# Patient Record
Sex: FEMALE | Race: WHITE | NOT HISPANIC OR LATINO | Employment: OTHER | URBAN - METROPOLITAN AREA
[De-identification: names, ages, dates, MRNs, and addresses within clinical notes are randomized per-mention and may not be internally consistent; named-entity substitution may affect disease eponyms.]

---

## 2017-07-26 ENCOUNTER — TELEPHONE (OUTPATIENT)
Dept: OBSTETRICS AND GYNECOLOGY | Facility: CLINIC | Age: 67
End: 2017-07-26

## 2017-07-26 NOTE — TELEPHONE ENCOUNTER
Returned pt call and pt stated she thinks she have a yeast infection. Informed pt that  is out of office and will be back on 8/7/2017. Advised pt if she needed to be seen sooner that I can schedule her with urgent care. Pt refused and stated she would like to see . Scheduled pt with  on 8/7/2017. Pt verbalized understanding

## 2017-07-26 NOTE — TELEPHONE ENCOUNTER
----- Message from Brianne Sweeney sent at 7/26/2017  1:54 PM CDT -----  Contact: self  Pt needing a call back, she think she have a yeast infection, pt use [facility] [email or web address]. She can be reached at [phone].

## 2017-08-07 ENCOUNTER — OFFICE VISIT (OUTPATIENT)
Dept: OBSTETRICS AND GYNECOLOGY | Facility: CLINIC | Age: 67
End: 2017-08-07
Attending: OBSTETRICS & GYNECOLOGY
Payer: COMMERCIAL

## 2017-08-07 VITALS
BODY MASS INDEX: 39.49 KG/M2 | SYSTOLIC BLOOD PRESSURE: 138 MMHG | WEIGHT: 237 LBS | HEIGHT: 65 IN | DIASTOLIC BLOOD PRESSURE: 76 MMHG

## 2017-08-07 DIAGNOSIS — Z01.419 WELL WOMAN EXAM WITH ROUTINE GYNECOLOGICAL EXAM: Primary | ICD-10-CM

## 2017-08-07 PROCEDURE — 99999 PR PBB SHADOW E&M-EST. PATIENT-LVL IV: CPT | Mod: PBBFAC,,, | Performed by: OBSTETRICS & GYNECOLOGY

## 2017-08-07 PROCEDURE — 99396 PREV VISIT EST AGE 40-64: CPT | Mod: S$GLB,,, | Performed by: OBSTETRICS & GYNECOLOGY

## 2017-08-07 RX ORDER — TAMSULOSIN HYDROCHLORIDE 0.4 MG/1
CAPSULE ORAL
Refills: 0 | COMMUNITY
Start: 2017-07-13 | End: 2019-05-01 | Stop reason: CLARIF

## 2017-08-07 RX ORDER — SODIUM, POTASSIUM,MAG SULFATES 17.5-3.13G
SOLUTION, RECONSTITUTED, ORAL ORAL
COMMUNITY
Start: 2017-08-04 | End: 2020-02-07

## 2017-08-07 RX ORDER — LINAGLIPTIN 5 MG/1
TABLET, FILM COATED ORAL
Refills: 3 | COMMUNITY
Start: 2017-07-12 | End: 2019-05-01 | Stop reason: CLARIF

## 2017-08-07 RX ORDER — METOPROLOL SUCCINATE 25 MG/1
TABLET, EXTENDED RELEASE ORAL
COMMUNITY
Start: 2017-07-24 | End: 2020-02-07 | Stop reason: SDUPTHER

## 2017-08-07 RX ORDER — ROSUVASTATIN CALCIUM 20 MG/1
TABLET, COATED ORAL
Refills: 8 | COMMUNITY
Start: 2017-06-05 | End: 2020-02-07 | Stop reason: SDUPTHER

## 2017-08-07 RX ORDER — CEPHALEXIN 500 MG/1
CAPSULE ORAL
Refills: 0 | COMMUNITY
Start: 2017-07-14 | End: 2019-05-01 | Stop reason: CLARIF

## 2017-08-07 RX ORDER — NITROGLYCERIN 0.4 MG/1
TABLET SUBLINGUAL
Refills: 4 | COMMUNITY
Start: 2017-05-12 | End: 2019-05-01 | Stop reason: CLARIF

## 2017-08-07 NOTE — PROGRESS NOTES
SUBJECTIVE:   66 y.o. female   for annual routine Pap and checkup. No LMP recorded. Patient has had a hysterectomy..      Past Medical History:   Diagnosis Date    Achilles tendon tear     Diabetes mellitus     Dysplasia of cervix     Glaucoma (increased eye pressure)     Hypertension     Thyroid disease     Urinary incontinence     occ urge     Past Surgical History:   Procedure Laterality Date    ACHILLES TENDON SURGERY      CERVIX LESION DESTRUCTION      cryo x 2    CHOLECYSTECTOMY      HYSTERECTOMY      BLAKE/ovaries remain    LAMINECTOMY      TUBAL LIGATION       Social History     Social History    Marital status:      Spouse name: N/A    Number of children: N/A    Years of education: N/A     Occupational History    Not on file.     Social History Main Topics    Smoking status: Former Smoker    Smokeless tobacco: Not on file    Alcohol use Yes      Comment: occ    Drug use: No    Sexual activity: Yes     Other Topics Concern    Not on file     Social History Narrative    No narrative on file     Family History   Problem Relation Age of Onset    Cancer Maternal Aunt      cervical    Breast cancer Paternal Grandmother     Ovarian cancer Neg Hx      OB History    Para Term  AB Living   3 3 3         SAB TAB Ectopic Multiple Live Births                  # Outcome Date GA Lbr Manuelito/2nd Weight Sex Delivery Anes PTL Lv   3 Term            2 Term            1 Term                   Current Outpatient Prescriptions   Medication Sig Dispense Refill    brimonidine-timolol (COMBIGAN) 0.2-0.5 % Drop Place 1 drop into both eyes.      cephALEXin (KEFLEX) 500 MG capsule TK ONE C PO  Q 8 H FOR 5 DAYS  0    ibuprofen (ADVIL,MOTRIN) 200 MG tablet Take 200 mg by mouth every 6 (six) hours as needed.      latanoprost 0.005 % ophthalmic solution 1 drop every evening.      levothyroxine (SYNTHROID) 100 MCG tablet Take 100 mcg by mouth once daily.      metformin (GLUCOPHAGE-XR)  500 MG 24 hr tablet       metformin (GLUMETZA) 500 MG (MOD) 24 hr tablet Take 500 mg by mouth daily with breakfast.      metoprolol succinate (TOPROL-XL) 25 MG 24 hr tablet       nitroGLYCERIN (NITROSTAT) 0.4 MG SL tablet   4    pravastatin (PRAVACHOL) 40 MG tablet       quinapril (ACCUPRIL) 10 MG tablet Take 10 mg by mouth once daily.      rosuvastatin (CRESTOR) 20 MG tablet TK 1 T PO QD  8    SUPREP BOWEL PREP KIT 17.5-3.13-1.6 gram SolR       SYNTHROID 112 mcg tablet       tamsulosin (FLOMAX) 0.4 mg Cp24 TK 1 C PO QD 30 MIN AFTER THE SAME MEAL  0    TRADJENTA 5 mg Tab tablet TK 1 T PO QD  3     No current facility-administered medications for this visit.      Allergies: Percocet [oxycodone-acetaminophen]     CHIEF COMPLAINT: She has no unusual complaints.   Annual visit Yes      ROS:  Constitutional: no weight loss, weight gain, fever, fatigue  Eyes:  No vision changes, glasses/contacts  ENT/Mouth: No ulcers, sinus problems, ears ringing, headache  Cardiovascular: No inability to lie flat, chest pain, exercise intolerance, swelling, heart palpitations  Respiratory: No wheezing, coughing blood, shortness of breath, or cough  Gastrointestinal: No diarrhea, bloody stool, nausea/vomiting, constipation, gas, hemorrhoids  Genitourinary: No blood in urine, painful urination, urgency of urination, frequency of urination, incomplete emptying, incontinence, abnormal bleeding, painful periods, heavy periods, vaginal discharge, vaginal odor, painful intercourse, sexual problems, bleeding after intercourse.  Musculoskeletal: No muscle weakness  Skin/Breast: No painful breasts, nipple discharge, masses, rash, ulcers  Neurological: No passing out, seizures, numbness, headache  Endocrine: No diabetes, hypothyroid, hyperthyroid, hot flashes, hair loss, abnormal hair growth, ance  Psychiatric: No depression, crying  Hematologic: No bruises, bleeding, swollen lymph nodes, anemia.      OBJECTIVE:   The patient appears  "well, alert, oriented x 3, in no distress.  /76   Ht 5' 5" (1.651 m)   Wt 107.5 kg (236 lb 15.9 oz)   BMI 39.44 kg/m²   NECK: no thyromegaly, trachea midline  SKIN: no acne, striae, hirsutism  BREAST EXAM: breasts appear normal, no suspicious masses, no skin or nipple changes or axillary nodes  ABDOMEN: no hernias, masses, or hepatosplenomegaly  GENITALIA: normal external genitalia, no erythema, no discharge  URETHRA: normal urethra, normal urethral meatus  VAGINA: Normal  CERVIX: absent  UTERUS: uterus absent  ADNEXA: no mass, fullness, tenderness      ASSESSMENT:   1. Well woman exam with routine gynecological exam        PLAN:   mammogram  return annually or prn   "

## 2017-08-08 ENCOUNTER — TELEPHONE (OUTPATIENT)
Dept: OBSTETRICS AND GYNECOLOGY | Facility: CLINIC | Age: 67
End: 2017-08-08

## 2017-08-08 ENCOUNTER — HOSPITAL ENCOUNTER (OUTPATIENT)
Dept: RADIOLOGY | Facility: HOSPITAL | Age: 67
Discharge: HOME | End: 2017-08-08
Attending: OBSTETRICS & GYNECOLOGY
Payer: COMMERCIAL

## 2017-08-08 DIAGNOSIS — Z12.31 ENCOUNTER FOR SCREENING MAMMOGRAM FOR BREAST CANCER: ICD-10-CM

## 2017-08-08 DIAGNOSIS — Z01.419 WELL WOMAN EXAM WITH ROUTINE GYNECOLOGICAL EXAM: ICD-10-CM

## 2017-08-08 PROCEDURE — 77063 BREAST TOMOSYNTHESIS BI: CPT | Mod: 26,,, | Performed by: RADIOLOGY

## 2017-08-08 PROCEDURE — 77067 SCR MAMMO BI INCL CAD: CPT | Mod: TC

## 2017-08-08 PROCEDURE — 77067 SCR MAMMO BI INCL CAD: CPT | Mod: 26,,, | Performed by: RADIOLOGY

## 2017-08-09 ENCOUNTER — TELEPHONE (OUTPATIENT)
Dept: OBSTETRICS AND GYNECOLOGY | Facility: CLINIC | Age: 67
End: 2017-08-09

## 2017-08-09 NOTE — TELEPHONE ENCOUNTER
----- Message from Brianne Sweeney sent at 8/9/2017  8:42 AM CDT -----  Contact: self   Pt returning a missed call, she can be reached at [phone].

## 2018-11-02 ENCOUNTER — HOSPITAL ENCOUNTER (OUTPATIENT)
Dept: RADIOLOGY | Facility: HOSPITAL | Age: 68
Discharge: HOME | End: 2018-11-02
Attending: INTERNAL MEDICINE
Payer: MEDICARE

## 2018-11-02 DIAGNOSIS — Z12.31 ENCOUNTER FOR SCREENING MAMMOGRAM FOR MALIGNANT NEOPLASM OF BREAST: ICD-10-CM

## 2018-11-02 PROCEDURE — 77067 SCR MAMMO BI INCL CAD: CPT | Mod: 26,,, | Performed by: RADIOLOGY

## 2018-11-02 PROCEDURE — 77067 SCR MAMMO BI INCL CAD: CPT | Mod: TC,PO

## 2019-05-01 ENCOUNTER — ANESTHESIA EVENT (OUTPATIENT)
Dept: SURGERY | Facility: OTHER | Age: 69
End: 2019-05-01
Payer: MEDICARE

## 2019-05-01 ENCOUNTER — HOSPITAL ENCOUNTER (OUTPATIENT)
Dept: PREADMISSION TESTING | Facility: OTHER | Age: 69
Discharge: HOME | End: 2019-05-01
Attending: ORTHOPAEDIC SURGERY
Payer: MEDICARE

## 2019-05-01 VITALS
HEIGHT: 65 IN | SYSTOLIC BLOOD PRESSURE: 141 MMHG | HEART RATE: 77 BPM | WEIGHT: 224 LBS | RESPIRATION RATE: 16 BRPM | BODY MASS INDEX: 37.32 KG/M2 | OXYGEN SATURATION: 97 % | TEMPERATURE: 98 F | DIASTOLIC BLOOD PRESSURE: 80 MMHG

## 2019-05-01 RX ORDER — ACETAMINOPHEN 500 MG
1000 TABLET ORAL
Status: CANCELLED | OUTPATIENT
Start: 2019-05-01 | End: 2019-05-01

## 2019-05-01 RX ORDER — MONTELUKAST SODIUM 10 MG/1
10 TABLET ORAL NIGHTLY
COMMUNITY
End: 2021-02-11 | Stop reason: SDUPTHER

## 2019-05-01 RX ORDER — FAMOTIDINE 20 MG/1
20 TABLET, FILM COATED ORAL
Status: CANCELLED | OUTPATIENT
Start: 2019-05-01 | End: 2019-05-01

## 2019-05-01 RX ORDER — CEFAZOLIN SODIUM 2 G/50ML
2 SOLUTION INTRAVENOUS
Status: CANCELLED | OUTPATIENT
Start: 2019-05-03

## 2019-05-01 RX ORDER — ACETAMINOPHEN 500 MG
500 TABLET ORAL EVERY 6 HOURS PRN
COMMUNITY

## 2019-05-01 RX ORDER — LIDOCAINE HYDROCHLORIDE 10 MG/ML
0.5 INJECTION, SOLUTION EPIDURAL; INFILTRATION; INTRACAUDAL; PERINEURAL ONCE
Status: CANCELLED | OUTPATIENT
Start: 2019-05-01 | End: 2019-05-01

## 2019-05-01 RX ORDER — SODIUM CHLORIDE, SODIUM LACTATE, POTASSIUM CHLORIDE, CALCIUM CHLORIDE 600; 310; 30; 20 MG/100ML; MG/100ML; MG/100ML; MG/100ML
INJECTION, SOLUTION INTRAVENOUS CONTINUOUS
Status: CANCELLED | OUTPATIENT
Start: 2019-05-01

## 2019-05-01 NOTE — DISCHARGE INSTRUCTIONS
PRE-ADMIT TESTING -  [phone]    [address]  [facility]          Your surgery has been scheduled at [facility]. We are pleased to have the opportunity to serve you. For Further Information please call [phone].    On the day of surgery please report to the Information Desk on the 1st floor.    · CONTACT YOUR PHYSICIAN'S OFFICE THE DAY PRIOR TO YOUR SURGERY TO OBTAIN YOUR ARRIVAL TIME.     · The evening before surgery do not eat anything after 9 p.m. ( this includes hard candy, chewing gum and mints).  You may only have GATORADE, POWERADE AND WATER  from 9 p.m. until you leave your home.   DO NOT DRINK ANY LIQUIDS ON THE WAY TO THE HOSPITAL.      SPECIAL MEDICATION INSTRUCTIONS: TAKE medications checked off by the Anesthesiologist on your Medication List.    Angiogram Patients: Take medications as instructed by your physician, including aspirin.     Surgery Patients:    If you take ASPIRIN - Your PHYSICIAN/SURGEON will need to inform you IF/OR when you need to stop taking aspirin prior to your surgery.     Do Not take any medications containing IBUPROFEN.  Do Not Wear any make-up or dark nail polish   (especially eye make-up) to surgery. If you come to surgery with makeup on you will be required to remove the makeup or nail polish.    Do not shave your surgical area at least 5 days prior to your surgery. The surgical prep will be performed at the hospital according to Infection Control regulations.    Leave all valuables at home.   Do Not wear any jewelry or watches, including any metal in body piercings. Jewelry must be removed prior to coming to the hospital.  There is a possibility that rings that are unable to be removed may be cut off if they are on the surgical extremity.    Contact Lens must be removed before surgery. Either do not wear the contact lens or bring a case and solution for storage.  Please bring a container for eyeglasses or dentures as required.  Bring  any paperwork your physician has provided, such as consent forms,  history and physicals, doctor's orders, etc.   Bring comfortable clothes that are loose fitting to wear upon discharge. Take into consideration the type of surgery being performed.  Maintain your diet as advised per your physician the day prior to surgery.      Adequate rest the night before surgery is advised.   Park in the Parking lot behind the hospital or in the [facility] across the street from the parking lot. Parking is complimentary.  If you will be discharged the same day as your procedure, please arrange for a responsible adult to drive you home or to accompany you if traveling by taxi.   YOU WILL NOT BE PERMITTED TO DRIVE OR TO LEAVE THE HOSPITAL ALONE AFTER SURGERY.   It is strongly recommended that you arrange for someone to remain with you for the first 24 hrs following your surgery.       Thank you for your cooperation.  The Staff of [facility].                Bathing Instructions with Hibiclens     Shower the evening before and morning of your procedure with Hibiclens:   Wash your face with water and your regular face wash/soap   Apply Hibiclens directly on your skin or on a wet washcloth and wash gently. When showering: Move away from the shower stream when applying Hibiclens to avoid rinsing off too soon.   Rinse thoroughly with warm water   Do not dilute Hibiclens         Dry off as usual, do not use any deodorant, powder, body lotions, perfume, after shave or cologne.

## 2019-05-01 NOTE — ANESTHESIA PREPROCEDURE EVALUATION
05/01/2019  Alvina Fisher is a 68 y.o., female.    Anesthesia Evaluation    I have reviewed the Patient Summary Reports.    I have reviewed the Nursing Notes.   I have reviewed the Medications.     Review of Systems  Anesthesia Hx:  No problems with previous Anesthesia  History of prior surgery of interest to airway management or planning: Previous anesthesia: General 6 yrs Achilles tendon surg with general anesthesia.  Denies Family Hx of Anesthesia complications.   Denies Personal Hx of Anesthesia complications.   Social:  Non-Smoker    Hematology/Oncology:  Hematology Normal   Oncology Normal     EENT/Dental:EENT/Dental Normal   Cardiovascular:   Hypertension, well controlled    Pulmonary:   Sleep Apnea, CPAP Has lung nodule no issues   Renal/:  Renal/ Normal     Hepatic/GI:   GERD, well controlled    Neurological:  Neurology Normal    Endocrine:   Diabetes, type 2        Physical Exam  General:  Obesity    Airway/Jaw/Neck:  Airway Findings: Mouth Opening: Small, but > 3cm Tongue: Normal  Mallampati: II      Dental:  Dental Findings: Molar caps, Upper front caps        Mental Status:  Mental Status Findings:  Cooperative, Alert and Oriented         Anesthesia Plan  Type of Anesthesia, risks & benefits discussed:  Anesthesia Type:  general  Patient's Preference:   Intra-op Monitoring Plan: standard ASA monitors  Intra-op Monitoring Plan Comments:   Post Op Pain Control Plan: multimodal analgesia  Post Op Pain Control Plan Comments:   Induction:   IV  Beta Blocker:         Informed Consent: Patient understands risks and agrees with Anesthesia plan.  Questions answered. Anesthesia consent signed with patient.  ASA Score: 2     Day of Surgery Review of History & Physical:    H&P update referred to the surgeon.     Anesthesia Plan Notes: Will get labs from Dr Mt Yost For Surgery From  Anesthesia Perspective.

## 2019-05-03 ENCOUNTER — ANESTHESIA (OUTPATIENT)
Dept: SURGERY | Facility: OTHER | Age: 69
End: 2019-05-03
Payer: MEDICARE

## 2019-05-03 ENCOUNTER — HOSPITAL ENCOUNTER (OUTPATIENT)
Facility: OTHER | Age: 69
Discharge: HOME | End: 2019-05-03
Attending: ORTHOPAEDIC SURGERY | Admitting: ORTHOPAEDIC SURGERY
Payer: MEDICARE

## 2019-05-03 VITALS
WEIGHT: 224 LBS | TEMPERATURE: 98 F | HEART RATE: 75 BPM | OXYGEN SATURATION: 95 % | BODY MASS INDEX: 37.32 KG/M2 | DIASTOLIC BLOOD PRESSURE: 70 MMHG | RESPIRATION RATE: 18 BRPM | SYSTOLIC BLOOD PRESSURE: 133 MMHG | HEIGHT: 65 IN

## 2019-05-03 DIAGNOSIS — M17.11 PRIMARY OSTEOARTHRITIS OF RIGHT KNEE: Primary | ICD-10-CM

## 2019-05-03 DIAGNOSIS — S83.231A COMPLEX TEAR OF MEDIAL MENISCUS OF RIGHT KNEE AS CURRENT INJURY, INITIAL ENCOUNTER: ICD-10-CM

## 2019-05-03 DIAGNOSIS — M17.11 ARTHRITIS OF KNEE, RIGHT: ICD-10-CM

## 2019-05-03 PROBLEM — S83.281A ACUTE LATERAL MENISCUS TEAR OF RIGHT KNEE: Status: ACTIVE | Noted: 2019-05-03

## 2019-05-03 LAB — POCT GLUCOSE: 137 MG/DL (ref 70–110)

## 2019-05-03 PROCEDURE — 71000016 HC POSTOP RECOV ADDL HR: Performed by: ORTHOPAEDIC SURGERY

## 2019-05-03 PROCEDURE — 63600175 PHARM REV CODE 636 W HCPCS: Performed by: NURSE ANESTHETIST, CERTIFIED REGISTERED

## 2019-05-03 PROCEDURE — 71000015 HC POSTOP RECOV 1ST HR: Performed by: ORTHOPAEDIC SURGERY

## 2019-05-03 PROCEDURE — 63600175 PHARM REV CODE 636 W HCPCS: Performed by: ANESTHESIOLOGY

## 2019-05-03 PROCEDURE — 63600175 PHARM REV CODE 636 W HCPCS: Mod: JG | Performed by: ORTHOPAEDIC SURGERY

## 2019-05-03 PROCEDURE — 25000003 PHARM REV CODE 250: Performed by: NURSE ANESTHETIST, CERTIFIED REGISTERED

## 2019-05-03 PROCEDURE — 25000003 PHARM REV CODE 250: Performed by: ORTHOPAEDIC SURGERY

## 2019-05-03 PROCEDURE — 37000009 HC ANESTHESIA EA ADD 15 MINS: Performed by: ORTHOPAEDIC SURGERY

## 2019-05-03 PROCEDURE — 63600175 PHARM REV CODE 636 W HCPCS: Performed by: ORTHOPAEDIC SURGERY

## 2019-05-03 PROCEDURE — 25000003 PHARM REV CODE 250: Performed by: ANESTHESIOLOGY

## 2019-05-03 PROCEDURE — 36000711: Performed by: ORTHOPAEDIC SURGERY

## 2019-05-03 PROCEDURE — 71000033 HC RECOVERY, INTIAL HOUR: Performed by: ORTHOPAEDIC SURGERY

## 2019-05-03 PROCEDURE — 71000039 HC RECOVERY, EACH ADD'L HOUR: Performed by: ORTHOPAEDIC SURGERY

## 2019-05-03 PROCEDURE — 36000710: Performed by: ORTHOPAEDIC SURGERY

## 2019-05-03 PROCEDURE — 27201423 OPTIME MED/SURG SUP & DEVICES STERILE SUPPLY: Performed by: ORTHOPAEDIC SURGERY

## 2019-05-03 PROCEDURE — 37000008 HC ANESTHESIA 1ST 15 MINUTES: Performed by: ORTHOPAEDIC SURGERY

## 2019-05-03 RX ORDER — SODIUM CHLORIDE 0.9 % (FLUSH) 0.9 %
3 SYRINGE (ML) INJECTION
Status: DISCONTINUED | OUTPATIENT
Start: 2019-05-03 | End: 2019-05-03 | Stop reason: HOSPADM

## 2019-05-03 RX ORDER — SODIUM CHLORIDE, SODIUM LACTATE, POTASSIUM CHLORIDE, CALCIUM CHLORIDE 600; 310; 30; 20 MG/100ML; MG/100ML; MG/100ML; MG/100ML
INJECTION, SOLUTION INTRAVENOUS CONTINUOUS
Status: DISCONTINUED | OUTPATIENT
Start: 2019-05-03 | End: 2019-05-03 | Stop reason: HOSPADM

## 2019-05-03 RX ORDER — FENTANYL CITRATE 50 UG/ML
INJECTION, SOLUTION INTRAMUSCULAR; INTRAVENOUS
Status: DISCONTINUED | OUTPATIENT
Start: 2019-05-03 | End: 2019-05-03

## 2019-05-03 RX ORDER — KETOROLAC TROMETHAMINE 30 MG/ML
15 INJECTION, SOLUTION INTRAMUSCULAR; INTRAVENOUS ONCE
Status: COMPLETED | OUTPATIENT
Start: 2019-05-03 | End: 2019-05-03

## 2019-05-03 RX ORDER — MEPERIDINE HYDROCHLORIDE 25 MG/ML
12.5 INJECTION INTRAMUSCULAR; INTRAVENOUS; SUBCUTANEOUS ONCE AS NEEDED
Status: DISCONTINUED | OUTPATIENT
Start: 2019-05-03 | End: 2019-05-03 | Stop reason: HOSPADM

## 2019-05-03 RX ORDER — CEFAZOLIN SODIUM 1 G/3ML
2 INJECTION, POWDER, FOR SOLUTION INTRAMUSCULAR; INTRAVENOUS
Status: DISCONTINUED | OUTPATIENT
Start: 2019-05-03 | End: 2019-05-03 | Stop reason: HOSPADM

## 2019-05-03 RX ORDER — BUPIVACAINE HYDROCHLORIDE 2.5 MG/ML
INJECTION, SOLUTION EPIDURAL; INFILTRATION; INTRACAUDAL
Status: DISCONTINUED | OUTPATIENT
Start: 2019-05-03 | End: 2019-05-03 | Stop reason: HOSPADM

## 2019-05-03 RX ORDER — GLYCOPYRROLATE 0.2 MG/ML
INJECTION INTRAMUSCULAR; INTRAVENOUS
Status: DISCONTINUED | OUTPATIENT
Start: 2019-05-03 | End: 2019-05-03

## 2019-05-03 RX ORDER — LIDOCAINE HYDROCHLORIDE 10 MG/ML
0.5 INJECTION, SOLUTION EPIDURAL; INFILTRATION; INTRACAUDAL; PERINEURAL ONCE
Status: DISCONTINUED | OUTPATIENT
Start: 2019-05-03 | End: 2019-05-03 | Stop reason: HOSPADM

## 2019-05-03 RX ORDER — ONDANSETRON 2 MG/ML
4 INJECTION INTRAMUSCULAR; INTRAVENOUS EVERY 12 HOURS PRN
Status: CANCELLED | OUTPATIENT
Start: 2019-05-03

## 2019-05-03 RX ORDER — EPINEPHRINE 1 MG/ML
INJECTION, SOLUTION INTRACARDIAC; INTRAMUSCULAR; INTRAVENOUS; SUBCUTANEOUS
Status: DISCONTINUED | OUTPATIENT
Start: 2019-05-03 | End: 2019-05-03 | Stop reason: HOSPADM

## 2019-05-03 RX ORDER — HYDROCODONE BITARTRATE AND ACETAMINOPHEN 10; 325 MG/1; MG/1
1 TABLET ORAL
Qty: 20 TABLET | Refills: 0 | Status: SHIPPED | OUTPATIENT
Start: 2019-05-03 | End: 2020-02-07

## 2019-05-03 RX ORDER — OXYCODONE HYDROCHLORIDE 5 MG/1
5 TABLET ORAL
Status: DISCONTINUED | OUTPATIENT
Start: 2019-05-03 | End: 2019-05-03 | Stop reason: HOSPADM

## 2019-05-03 RX ORDER — ACETAMINOPHEN 500 MG
1000 TABLET ORAL
Status: COMPLETED | OUTPATIENT
Start: 2019-05-03 | End: 2019-05-03

## 2019-05-03 RX ORDER — HYDROMORPHONE HYDROCHLORIDE 2 MG/ML
1 INJECTION, SOLUTION INTRAMUSCULAR; INTRAVENOUS; SUBCUTANEOUS EVERY 4 HOURS PRN
Status: CANCELLED | OUTPATIENT
Start: 2019-05-03

## 2019-05-03 RX ORDER — IBUPROFEN 600 MG/1
600 TABLET ORAL EVERY 6 HOURS PRN
Status: CANCELLED | OUTPATIENT
Start: 2019-05-03

## 2019-05-03 RX ORDER — MIDAZOLAM HYDROCHLORIDE 1 MG/ML
INJECTION INTRAMUSCULAR; INTRAVENOUS
Status: DISCONTINUED | OUTPATIENT
Start: 2019-05-03 | End: 2019-05-03

## 2019-05-03 RX ORDER — ONDANSETRON 4 MG/1
8 TABLET, ORALLY DISINTEGRATING ORAL EVERY 8 HOURS PRN
Qty: 10 TABLET | Refills: 1 | Status: SHIPPED | OUTPATIENT
Start: 2019-05-03 | End: 2021-02-11 | Stop reason: SDUPTHER

## 2019-05-03 RX ORDER — LIDOCAINE HCL/PF 100 MG/5ML
SYRINGE (ML) INTRAVENOUS
Status: DISCONTINUED | OUTPATIENT
Start: 2019-05-03 | End: 2019-05-03

## 2019-05-03 RX ORDER — PROPOFOL 10 MG/ML
VIAL (ML) INTRAVENOUS
Status: DISCONTINUED | OUTPATIENT
Start: 2019-05-03 | End: 2019-05-03

## 2019-05-03 RX ORDER — ONDANSETRON 2 MG/ML
INJECTION INTRAMUSCULAR; INTRAVENOUS
Status: DISCONTINUED | OUTPATIENT
Start: 2019-05-03 | End: 2019-05-03

## 2019-05-03 RX ORDER — ONDANSETRON 2 MG/ML
4 INJECTION INTRAMUSCULAR; INTRAVENOUS DAILY PRN
Status: DISCONTINUED | OUTPATIENT
Start: 2019-05-03 | End: 2019-05-03 | Stop reason: HOSPADM

## 2019-05-03 RX ORDER — HYDROMORPHONE HYDROCHLORIDE 2 MG/ML
0.4 INJECTION, SOLUTION INTRAMUSCULAR; INTRAVENOUS; SUBCUTANEOUS EVERY 5 MIN PRN
Status: DISCONTINUED | OUTPATIENT
Start: 2019-05-03 | End: 2019-05-03 | Stop reason: HOSPADM

## 2019-05-03 RX ORDER — HYDROCODONE BITARTRATE AND ACETAMINOPHEN 5; 325 MG/1; MG/1
1 TABLET ORAL EVERY 4 HOURS PRN
Status: CANCELLED | OUTPATIENT
Start: 2019-05-03

## 2019-05-03 RX ORDER — FAMOTIDINE 20 MG/1
20 TABLET, FILM COATED ORAL
Status: COMPLETED | OUTPATIENT
Start: 2019-05-03 | End: 2019-05-03

## 2019-05-03 RX ORDER — HYDRALAZINE HYDROCHLORIDE 20 MG/ML
10 INJECTION INTRAMUSCULAR; INTRAVENOUS ONCE
Status: COMPLETED | OUTPATIENT
Start: 2019-05-03 | End: 2019-05-03

## 2019-05-03 RX ADMIN — LIDOCAINE HYDROCHLORIDE 75 MG: 20 INJECTION, SOLUTION INTRAVENOUS at 07:05

## 2019-05-03 RX ADMIN — FAMOTIDINE 20 MG: 20 TABLET, FILM COATED ORAL at 06:05

## 2019-05-03 RX ADMIN — HYDROMORPHONE HYDROCHLORIDE 0.4 MG: 2 INJECTION INTRAMUSCULAR; INTRAVENOUS; SUBCUTANEOUS at 08:05

## 2019-05-03 RX ADMIN — CARBOXYMETHYLCELLULOSE SODIUM 2 DROP: 2.5 SOLUTION/ DROPS OPHTHALMIC at 07:05

## 2019-05-03 RX ADMIN — GLYCOPYRROLATE 0.2 MG: 0.2 INJECTION, SOLUTION INTRAMUSCULAR; INTRAVENOUS at 08:05

## 2019-05-03 RX ADMIN — PROPOFOL 200 MG: 10 INJECTION, EMULSION INTRAVENOUS at 07:05

## 2019-05-03 RX ADMIN — SODIUM CHLORIDE, SODIUM LACTATE, POTASSIUM CHLORIDE, AND CALCIUM CHLORIDE: 600; 310; 30; 20 INJECTION, SOLUTION INTRAVENOUS at 06:05

## 2019-05-03 RX ADMIN — ACETAMINOPHEN 1000 MG: 500 TABLET, FILM COATED ORAL at 06:05

## 2019-05-03 RX ADMIN — MIDAZOLAM HYDROCHLORIDE 2 MG: 1 INJECTION, SOLUTION INTRAMUSCULAR; INTRAVENOUS at 07:05

## 2019-05-03 RX ADMIN — KETOROLAC TROMETHAMINE 15 MG: 30 INJECTION, SOLUTION INTRAMUSCULAR at 09:05

## 2019-05-03 RX ADMIN — ONDANSETRON 4 MG: 2 INJECTION INTRAMUSCULAR; INTRAVENOUS at 08:05

## 2019-05-03 RX ADMIN — FENTANYL CITRATE 100 MCG: 50 INJECTION, SOLUTION INTRAMUSCULAR; INTRAVENOUS at 07:05

## 2019-05-03 RX ADMIN — CEFAZOLIN 2 G: 330 INJECTION, POWDER, FOR SOLUTION INTRAMUSCULAR; INTRAVENOUS at 07:05

## 2019-05-03 RX ADMIN — HYDRALAZINE HYDROCHLORIDE 10 MG: 20 INJECTION INTRAMUSCULAR; INTRAVENOUS at 09:05

## 2019-05-03 NOTE — ANESTHESIA POSTPROCEDURE EVALUATION
Anesthesia Post Evaluation    Patient: Alvina Fisher    Procedure(s) Performed: Procedure(s) (LRB):  ARTHROSCOPY, KNEE (Right)  INJECTION, JOINT - SYNVISE INJECTION (Right)  MENISCECTOMY, KNEE, MEDIAL (Right)  ARTHROSCOPY, KNEE, WITH CHONDROPLASTY (Right)    Final Anesthesia Type: general  Patient location during evaluation: PACU  Patient participation: Yes- Able to Participate  Level of consciousness: awake and alert and oriented  Post-procedure vital signs: reviewed and stable  Pain management: adequate  Airway patency: patent  PONV status at discharge: No PONV  Anesthetic complications: no      Cardiovascular status: blood pressure returned to baseline and hemodynamically stable  Respiratory status: unassisted, spontaneous ventilation and room air  Hydration status: euvolemic  Follow-up not needed.          Vitals Value Taken Time   /62 5/3/2019  9:29 AM   Temp 36.8 °C (98.2 °F) 5/3/2019  9:29 AM   Pulse 72 5/3/2019  9:29 AM   Resp 20 5/3/2019  9:25 AM   SpO2 92 % 5/3/2019  9:29 AM         Event Time     Out of Recovery 09:41:00          Pain/Edil Score: Pain Rating Prior to Med Admin: 8 (flacc 0) (5/3/2019  9:13 AM)  Pain Rating Post Med Admin: 8 (5/3/2019  8:37 AM)  Edil Score: 10 (5/3/2019  9:24 AM)

## 2019-05-03 NOTE — TRANSFER OF CARE
"Anesthesia Transfer of Care Note    Patient: Alvina Fisher    Procedure(s) Performed: Procedure(s) (LRB):  ARTHROSCOPY, KNEE (Right)  INJECTION, JOINT - SYNVISE INJECTION (Right)  MENISCECTOMY, KNEE, MEDIAL (Right)  ARTHROSCOPY, KNEE, WITH CHONDROPLASTY (Right)    Patient location: PACU    Anesthesia Type: general    Transport from OR: Transported from OR on 2-3 L/min O2 by NC with adequate spontaneous ventilation. Continuous SpO2 monitoring in transport    Post pain: adequate analgesia    Post assessment: no apparent anesthetic complications    Post vital signs: stable    Level of consciousness: awake    Nausea/Vomiting: no nausea/vomiting    Complications: none    Transfer of care protocol was followed      Last vitals:   Visit Vitals  BP (!) 144/83   Pulse 74   Temp 36.9 °C (98.5 °F) (Oral)   Resp 18   Ht 5' 5" (1.651 m)   Wt 101.6 kg (224 lb)   SpO2 95%   Breastfeeding? No   BMI 37.28 kg/m²     "
Cooperative

## 2019-05-03 NOTE — DISCHARGE INSTRUCTIONS
KNEE ARTHROSCOPY       POST OPERATIVE INSTRUCTIONS        DR. Villalpando    1. ICE - apply ice to the affected knee for thirty minutes, 4 or 5 times for the first few days, and then as needed to control swelling.     2.  EXERCISES -  Perform straight leg raise exercises to strengthen the quad by holding the knee out straight and lifting the leg up to a 45 degree angle and holding for a count of five. Do forty straight leg raise exercises twice a day, starting as soon as possible after surgery. It is ok and advisable to start moving the knee through range of motion as soon as possible.      3.  CRUTCHES - walk with crutches, weight bearing as tolerated. The crutches can be discontinued when pain allows full weight bearing. Normally, this takes 2-5 days.     4.  DRESSING - remove the ace bandage, padding and Band-aids 3-4 days following surgery. Reapply new Band-aids and re-wrap with a new ace wrap.      5.  BATHING - Do not get the knee wet until seen at your post-op visit.     6.  APPOINTMENT - call [phone] to make an appointment for suture removal 10-14 days after surgery.              Anesthesia: After Your Surgery  Youve just had surgery. During surgery, you received medication called anesthesia to keep you comfortable and pain-free. After surgery, you may experience some pain or nausea. This is common. Here are some tips for feeling better and recovering after surgery.    Going home  Your doctor or nurse will show you how to take care of yourself when you go home. He or she will also answer your questions. Have an adult family member or friend drive you home. For the first 24 hours after your surgery:  · Do not drive or use heavy equipment.  · Do not make important decisions or sign legal documents.  · Avoid alcohol.  · Have someone stay with you, if needed. He or she can watch for problems and help keep you safe.  Be sure to keep all follow-up appointments with your doctor. And rest after your procedure  for as long as your doctor tells you to.    Coping with pain  If you have pain after surgery, pain medication will help you feel better. Take it as directed, before pain becomes severe. Also, ask your doctor or pharmacist about other ways to control pain, such as with heat, ice, and relaxation. And follow any other instructions your surgeon or nurse gives you.    URINARY RETENTION  Should you experience a decrease in your urine output or are unable to urinate following surgery, this can be due to the medications given during surgery.  We recommend you going to the nearest Emergency Department.    Tips for taking pain medication  To get the best relief possible, remember these points:  · Pain medications can upset your stomach. Taking them with a little food may help.  · Most pain relievers taken by mouth need at least 20 to 30 minutes to take effect.  · Taking medication on a schedule can help you remember to take it. Try to time your medication so that you can take it before beginning an activity, such as dressing, walking, or sitting down for dinner.  · Constipation is a common side effect of pain medications. Contact your doctor before taking any medications like laxatives or stool softeners to help relieve constipation. Also ask about any dietary restrictions, because drinking lots of fluids and eating foods like fruits and vegetables that are high in fiber can also help. Remember, dont take laxatives unless your surgeon has prescribed them.  · Mixing alcohol and pain medication can cause dizziness and slow your breathing. It can even be fatal. Dont drink alcohol while taking pain medication.  · Pain medication can slow your reflexes. Dont drive or operate machinery while taking pain medication.  If your health care provider tells you to take acetaminophen to help relieve your pain, ask him or her how much you are supposed to take each day. (Acetaminophen is the generic name for Tylenol and other brand-name  pain relievers.) Acetaminophen or other pain relievers may interact with your prescription medicines or other over-the-counter (OTC) drugs. Some prescription medications contain acetaminophen along with other active ingredients. Using both prescription and OTC acetaminophen for pain can cause you to overdose. The FDA recommends that you read the labels on your OTC medications carefully. This will help you to clearly understand the list of active ingredients, dosing instructions, and any warnings. It may also help you avoid taking too much acetaminophen. If you have questions or don't understand the information, ask your pharmacist or health care provider to explain it to you before you take the OTC medication.    Managing nausea  Some people have an upset stomach after surgery. This is often due to anesthesia, pain, pain medications, or the stress of surgery. The following tips will help you manage nausea and get good nutrition as you recover. If you were on a special diet before surgery, ask your doctor if you should follow it during recovery. These tips may help:  · Dont push yourself to eat. Your body will tell you when to eat and how much.  · Start off with clear liquids and soup. They are easier to digest.  · Progress to semi-solid foods (mashed potatoes, applesauce, and gelatin) as you feel ready.  · Slowly move to solid foods. Dont eat fatty, rich, or spicy foods at first.  · Dont force yourself to have three large meals a day. Instead, eat smaller amounts more often.  · Take pain medications with a small amount of solid food, such as crackers or toast to avoid nausea.      Call your surgeon if    · You feel too sleepy, dizzy, or groggy (medication may be too strong).  · You have side effects like nausea, vomiting, or skin changes (rash, itching, or hives).   © 3789-1456 [employer]. [address] [place] [address], [place] [address]. All rights reserved. This information is not intended as a  substitute for professional medical care. Always follow your healthcare professional's instructions.

## 2019-05-03 NOTE — OP NOTE
[facility]  Surgery Department  Operative Note    SUMMARY     Date of Procedure: 5/3/2019     Procedure:   1. Right knee arthroscopic partial medial and lateral meniscectomy  2. Right knee arthroscopic chondroplasty medial femoral condyle  3. Right knee Synvisc injection    Surgeon(s) and Role:     * Doug Alexander MD - Primary    Assistants: Carmella Vizcarra CST, Faiza Lorenzo CST    Pre-Operative Diagnosis: Primary osteoarthritis of right knee [M17.11], MMT, LMT    Post-Operative Diagnosis: Same    Anesthesia: General + Local    Technical Procedures Used: Ms. Fisher was taken to the operating room on 05/03/2019 for planned right knee arthroscopy.  She was given 2 g of Ancef preoperatively.  She was taken to the operating room and placed in the supine position.  General endotracheal anesthesia was administered.  Examination under anesthesia revealed full passive motion and a trace effusion.  The foot of the bed was dropped in her right thigh was placed in the leg ge with a nonsterile tourniquet. Her right knee was then prepped and draped in the usual sterile fashion. A time-out procedure was performed identifying the right knee as the surgical site.  Esmarch was then used to exsanguinate the limb.    A standard anterolateral portal was established followed by an anteromedial portal established under direct visualization with spinal needle localization.  Diagnostic arthroscopy then proceeded.    With valgus stress the medial compartment was opened up.  There was a grade 3 diffuse chondral lesion on the weight-bearing surface of the medial femoral condyle.  There were unstable cartilage edges so the shaver was used to perform a light chondroplasty just to trim up the edges. There was a complex posterior horn medial meniscus tear. This was obviously reparable involving the white-white region. Instrumenting from both the lateral and medial portals a partial medial meniscectomy was performed  achieving a stable rim.  Approximately 50% of the posterior horn was remaining after partial meniscectomy.  Notch view revealed intact ACL and PCL.  Lateral compartment revealed mild grade 2 chondromalacia involving the lateral tibial plateau and 1/2 changes of the weight-bearing surface of the lateral femoral condyle.  There was, however, a high-grade lateral meniscal root tear. This was probed and found to be unstable.  Given the patient's underlying degenerative changes and age I decided to perform a subtotal lateral meniscectomy.  Instrumenting from both medial and lateral portals the posterior horn of the lateral meniscus was trimmed to a stable rim.  There was essentially 0% of the posterior horn remaining which then tapered into 30% along the popliteal hiatus and 200% at the anterior horn.  This was probed and found to be stable. There was a diffuse grade 2 and to lesser extent grade 3 chondromalacia of the patella medial and lateral facet as well as the trochlear groove.  There were no unstable edges so this was left alone.  The shaver was then brought in to remove any soft tissue or bony debris.  The scope was then taken back to the knee to confirm no additional pathology. All excess fluid was then removed from the knee.  The portals were closed with 3 0 Prolene suture.  The portal sites were injected with 10 cc of 0.25% Marcaine. Finally through a superior/lateral accessory location the Synvisc viscosupplementation was injected into the suprapatellar pouch without difficulty.  A soft dressing was then applied followed by a polar Care unit.  The patient was then extubated in the operating room and taken to the PACU without complication.    Description of the Findings of the Procedure: lateral meniscal root tear, complex MMT, OA    Significant Surgical Tasks Conducted by the Assistant(s), if Applicable:     Complications: No    Estimated Blood Loss (EBL): 3cc           Implants: * No implants in log  *    Specimens:   Specimen (12h ago, onward)    None                  Condition: Good    Disposition: PACU - hemodynamically stable.    Attestation: I was present and scrubbed for the entire procedure.    Discharge Note    SUMMARY     Admit Date: 5/3/2019    Discharge Date and Time:  05/03/2019 8:06 AM    Hospital Course (synopsis of major diagnoses, care, treatment, and services provided during the course of the hospital stay): outpt     Final Diagnosis: Post-Op Diagnosis Codes:     * Primary osteoarthritis of right knee [M17.11]    Disposition: Home or Self Care    Follow Up/Patient Instructions:     Medications:  Reconciled Home Medications:      Medication List      START taking these medications    HYDROcodone-acetaminophen  mg per tablet  Commonly known as:  NORCO  Take 1 tablet by mouth every 4 to 6 hours as needed.     ondansetron 4 MG Tbdl  Commonly known as:  ZOFRAN-ODT  Take 2 tablets (8 mg total) by mouth every 8 (eight) hours as needed.        CONTINUE taking these medications    acetaminophen 500 MG tablet  Commonly known as:  TYLENOL  Take 500 mg by mouth every 6 (six) hours as needed for Pain.     COMBIGAN 0.2-0.5 % Drop  Generic drug:  brimonidine-timolol  Place 1 drop into both eyes.     metFORMIN 500 MG 24 hr tablet  Commonly known as:  GLUCOPHAGE-XR  500 mg 2 (two) times daily with meals.     metoprolol succinate 25 MG 24 hr tablet  Commonly known as:  TOPROL-XL     montelukast 10 mg tablet  Commonly known as:  SINGULAIR  Take 10 mg by mouth every evening.     quinapril 10 MG tablet  Commonly known as:  ACCUPRIL  Take 10 mg by mouth once daily.     rosuvastatin 20 MG tablet  Commonly known as:  CRESTOR  TK 1 T PO QD     SUPREP BOWEL PREP KIT 17.5-3.13-1.6 gram Solr  Generic drug:  sodium,potassium,mag sulfates     SYNTHROID 112 MCG tablet  Generic drug:  levothyroxine          Discharge Procedure Orders   Diet general     Call MD for:  temperature >100.4     Call MD for:  persistent nausea  and vomiting     Call MD for:  severe uncontrolled pain     Call MD for:  difficulty breathing, headache or visual disturbances     Call MD for:  redness, tenderness, or signs of infection (pain, swelling, redness, odor or green/yellow discharge around incision site)     Call MD for:  hives     Call MD for:  persistent dizziness or light-headedness     Call MD for:  extreme fatigue     Ice to affected area     Remove dressing in 48 hours   Order Comments: Then change daily. Keep clean, dry and covered     Weight bearing restrictions (specify)   Order Comments: Partial WB with crutches until clinic     Follow-up Information     Doug Wagner MD. Schedule an appointment as soon as possible for a visit in 10 days.    Specialty:  Orthopedic Surgery  Why:  For suture removal, For wound re-check  Contact information:  On license of [facility] [facility] [address] [phone]

## 2020-02-06 NOTE — PROGRESS NOTES
This note was created by combination of typed  and M-Modal dictation.  Transcription errors may be present.  If there are any questions, please contact me.    Assessment / Plan:   Normal physical exam    Hypothyroidism due to Hashimoto's thyroiditis  -check TSH on Synthroid 112, refill to mail order  -     Discontinue: levothyroxine (SYNTHROID) 112 MCG tablet; Take 1 tablet (112 mcg total) by mouth before breakfast.  Dispense: 90 tablet; Refill: 3  -     levothyroxine (SYNTHROID) 112 MCG tablet; Take 1 tablet (112 mcg total) by mouth before breakfast.  Dispense: 90 tablet; Refill: 3  -     TSH; Future; Expected date: 02/07/2020    Type 2 diabetes mellitus without complication, without long-term current use of insulin  -ft exam normal today.  Some residual neuropathy from history of left sciatica/radiculopathy status post lumbar surgery.  She reports she is up-to-date on her eye exam I will try to get the results of that.  Check labs today.  Refilled metformin.  Does have room to increase if necessary.  She used to take Trulicity but too expensive, has not taken since November.  If high, increase the metformin.  She could not afford because of the Medicare donut hole but now that it is a new year might start her back up on it if necessary.  -     Discontinue: metFORMIN (GLUCOPHAGE-XR) 500 MG XR 24hr tablet; Take 1 tablet (500 mg total) by mouth 2 (two) times daily with meals.  Dispense: 180 tablet; Refill: 3  -     metFORMIN (GLUCOPHAGE-XR) 500 MG XR 24hr tablet; Take 1 tablet (500 mg total) by mouth 2 (two) times daily with meals.  Dispense: 180 tablet; Refill: 3  -     CBC auto differential; Future; Expected date: 02/08/2020  -     Comprehensive metabolic panel; Future; Expected date: 02/08/2020  -     Lipid panel; Future; Expected date: 02/08/2020  -     Hemoglobin A1c; Future; Expected date: 02/08/2020  -     Microalbumin/creatinine urine ratio; Future; Expected date: 02/08/2020    Essential  hypertension  -stable, refilled quinapril and metoprolol.  She is also followed by Cardiology for family history of coronary artery disease  -     Discontinue: metoprolol succinate (TOPROL-XL) 25 MG 24 hr tablet; Take 1 tablet (25 mg total) by mouth once daily.  Dispense: 90 tablet; Refill: 3  -     Discontinue: quinapril (ACCUPRIL) 10 MG tablet; Take 1 tablet (10 mg total) by mouth once daily.  Dispense: 90 tablet; Refill: 3  -     metoprolol succinate (TOPROL-XL) 25 MG 24 hr tablet; Take 1 tablet (25 mg total) by mouth once daily.  Dispense: 90 tablet; Refill: 3  -     quinapril (ACCUPRIL) 10 MG tablet; Take 1 tablet (10 mg total) by mouth once daily.  Dispense: 90 tablet; Refill: 3    Dyslipidemia  Family history of heart disease Dr. Arredondo  -on rosuvastatin mid range dose, refilled.  Check labs.  -     Discontinue: rosuvastatin (CRESTOR) 20 MG tablet; TK 1 T PO QD  Dispense: 90 tablet; Refill: 3  -     rosuvastatin (CRESTOR) 20 MG tablet; TK 1 T PO QD  Dispense: 90 tablet; Refill: 3    Pulmonary nodule  Lung nodule s/p CT guided bx 9/2019 benign  -status post CT-guided biopsy done in September, benign.  But the plan is to continue to monitor because it may still grow.  Followed by CT surgery.    Obstructive sleep apnea  -CPAP    Other specified glaucoma, unspecified laterality  -followed by ophtho    Screening for osteoporosis  -2014 normal repeat 2024    Chronic midline low back pain without sciatica hx of laminectomy L5S1; flexeril PRN  Left lumbar radiculopathy from L sciatica, remote laminectomy but residual weakness/numbness L leg  -refilled flexeril.  -     cyclobenzaprine (FLEXERIL) 10 MG tablet; Take 1 tablet (10 mg total) by mouth every evening.  Dispense: 30 tablet; Refill: 2    Need for vaccination for Strep pneumoniae  -     (In Office Administered) Pneumococcal Conjugate Vaccine (13 Valent) (IM)    Need for shingles vaccine  -     varicella-zoster gE-AS01B, PF, (SHINGRIX, PF,) 50 mcg/0.5 mL  injection; Inject 0.5 mLs into the muscle once. Repeat in 2 months for 1 dose  Dispense: 0.5 mL; Refill: 1    Need for hepatitis C screening test  -     Hepatitis C antibody; Future; Expected date: 02/07/2020      Medications Discontinued During This Encounter   Medication Reason    SUPREP BOWEL PREP KIT 17.5-3.13-1.6 gram SolR Patient no longer taking    HYDROcodone-acetaminophen (NORCO)  mg per tablet Patient no longer taking    cyclobenzaprine (FLEXERIL) 10 MG tablet Reorder    metformin (GLUCOPHAGE-XR) 500 MG 24 hr tablet Reorder    metoprolol succinate (TOPROL-XL) 25 MG 24 hr tablet Reorder    quinapril (ACCUPRIL) 10 MG tablet Reorder    SYNTHROID 112 mcg tablet Reorder    rosuvastatin (CRESTOR) 20 MG tablet Reorder    rosuvastatin (CRESTOR) 20 MG tablet Reorder    metoprolol succinate (TOPROL-XL) 25 MG 24 hr tablet Reorder    quinapril (ACCUPRIL) 10 MG tablet Reorder    levothyroxine (SYNTHROID) 112 MCG tablet Reorder    metFORMIN (GLUCOPHAGE-XR) 500 MG XR 24hr tablet Reorder       meds sent this encounter:  Medications Ordered This Encounter   Medications    cyclobenzaprine (FLEXERIL) 10 MG tablet     Sig: Take 1 tablet (10 mg total) by mouth every evening.     Dispense:  30 tablet     Refill:  2    levothyroxine (SYNTHROID) 112 MCG tablet     Sig: Take 1 tablet (112 mcg total) by mouth before breakfast.     Dispense:  90 tablet     Refill:  3    metFORMIN (GLUCOPHAGE-XR) 500 MG XR 24hr tablet     Sig: Take 1 tablet (500 mg total) by mouth 2 (two) times daily with meals.     Dispense:  180 tablet     Refill:  3    metoprolol succinate (TOPROL-XL) 25 MG 24 hr tablet     Sig: Take 1 tablet (25 mg total) by mouth once daily.     Dispense:  90 tablet     Refill:  3    quinapril (ACCUPRIL) 10 MG tablet     Sig: Take 1 tablet (10 mg total) by mouth once daily.     Dispense:  90 tablet     Refill:  3    rosuvastatin (CRESTOR) 20 MG tablet     Sig: TK 1 T PO QD     Dispense:  90 tablet      Refill:  3    varicella-zoster gE-AS01B, PF, (SHINGRIX, PF,) 50 mcg/0.5 mL injection     Sig: Inject 0.5 mLs into the muscle once. Repeat in 2 months for 1 dose     Dispense:  0.5 mL     Refill:  1       Follow Up: No follow-ups on file. if all normal OV 6 months will need previsit labs. Recall entered      Subjective:     Chief Complaint   Patient presents with    [facility]       HPI  Alvina is a 69 y.o. female, last appointment with this clinic was Visit date not found.    No LMP recorded. Patient has had a hysterectomy.    New patient; previous Dr retired.  Mt   History of left pulmonary nodule.  Seen by outside CT surgery.  Had previously been sent to intervention Radiology but was unable to biopsy the lesion.  Increased in size from initial 0.7-1.3 cm over 3 years.  Plan is to monitor.  CT surgery was not convinced that he would be able to find it in surgery for a wedge resection.  In which case it might be lobectomy.  9/12/19 CT-guided biopsy:  LUNG, LEFT LOWER LOBE, CORE BIOPSY:   - SMALL FRAGMENTS OF BENIGN PULMONARY ALVEOLAR PARENCHYMA.   - NO EVIDENCE OF NEOPLASM OR GRANULOMATOUS DISEASE.   Reports that she needs to have continue monitoring because he continues to grow.    Obstructive sleep apnea/CPAP    Diabetes type 2  Hypertension  Hyperlipidemia  Hypothyroid    10/03/2013 nuclear medicine stress test no significant EKG changes.  No chest pain. Normal perfusion scan.  Normal LV systolic function  10/03/2013 TTE normal LV size wall thickness and systolic function.    02/02/18 Coronary calcium score was 46 with 41 in the LAD, circumflex was 5. Her liver measured 41 Hounsfield units. The spleen could not be visualized. These findings are consistent with fatty liver. The textural appearance to me of the liver was that of fatty liver. She had normal origin of the coronary arteries, normal configuration of the pulmonary veins, left atrium 35 mm, calcified plaque was also seen in the aortic  annulus. Per Radiology, she had a 1 cm nodule in the left lower lobe. Radiology recommended followup in six months with CT.  05/19/17 Nuclear stress test: Five minutes standard Dario protocol. 1 mm horizontal ST depression. Substernal chest heaviness and tightness, resolved in recovery. Perfusion scan normal.  05/17 Echocardiogram shows normal cardiac function. EF normal. Greater than 50%. Grade I diastolic function.    11/25/2014 DEXA scan normal bone mineral density.      Glaucoma, Dr. Muro at [facility].    Colonoscopy almost due. Hx of polyp but they apparently lost it after excision.  [facility].     Flexeril PRN use. nigthtime use. For R knee pain; hx of knee surgery 5/2019. With OA. And hx of back surgery years ago.  History of left sciatica/radiculopathy, status post lumbar laminectomy but still some residual weakness and numbness on the left leg as compared to the right.    Home glc good. Not daily.  110s by recollection.     Just started weight watchers.  Hx of trulicity but expensive. But that helped it a lot. Off of trulicity now x 11/2019.  Never higher than metformin 1000 total    Notes chronic constipation.    Patient Care Team:  Primary Doctor No as PCP - General  Flory Garcia MD as PCP - Cardiology (Cardiology)  Yuriy Amor MD as Consulting Physician (Internal Medicine)  Radha Calix MD (Pulmonary Disease)  Edmund Cantrell MD as Consulting Physician (Cardiothoracic Surgery)  Wilbert Arredondo MD (Cardiology)  Chris Muro MD as Consulting Physician (Ophthalmology)  [facility] (Gastroenterology)    Patient Active Problem List    Diagnosis Date Noted    Lung nodule s/p CT guided bx 9/2019 benign 02/07/2020     History of left pulmonary nodule.  Seen by outside CT surgery.  Had previously been sent to intervention Radiology but was unable to biopsy the lesion.  Increased in size from initial 0.7-1.3 cm over 3 years.  Plan is to  monitor.  CT surgery was not convinced that he would be able to find it in surgery for a wedge resection.  In which case it might be lobectomy.  9/12/19 CT-guided biopsy:  LUNG, LEFT LOWER LOBE, CORE BIOPSY:   - SMALL FRAGMENTS OF BENIGN PULMONARY ALVEOLAR PARENCHYMA.   - NO EVIDENCE OF NEOPLASM OR GRANULOMATOUS DISEASE.       Family history of heart disease Dr. Arredondo 02/07/2020     10/03/2013 nuclear medicine stress test no significant EKG changes.  No chest pain. Normal perfusion scan.  Normal LV systolic function  10/03/2013 TTE normal LV size wall thickness and systolic function.  05/19/17 Nuclear stress test: Five minutes standard Dario protocol. 1 mm horizontal ST depression. Substernal chest heaviness and tightness, resolved in recovery. Perfusion scan normal.  05/17 Echocardiogram shows normal cardiac function. EF normal. Greater than 50%. Grade I diastolic function.  02/02/18 Coronary calcium score was 46 with 41 in the LAD, circumflex was 5. Her liver measured 41 Hounsfield units. The spleen could not be visualized. These findings are consistent with fatty liver. The textural appearance to me of the liver was that of fatty liver. She had normal origin of the coronary arteries, normal configuration of the pulmonary veins, left atrium 35 mm, calcified plaque was also seen in the aortic annulus. Per Radiology, she had a 1 cm nodule in the left lower lobe. Radiology recommended followup in six months with CT.        Other specified glaucoma 02/07/2020    Screening for osteoporosis 02/07/2020 11/25/2014 DEXA scan normal bone mineral density.       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       PAST MEDICAL HISTORY:  Past Medical History:   Diagnosis Date    Achilles tendon tear     Diabetes mellitus     Dysplasia of cervix     GERD (gastroesophageal reflux disease)     Glaucoma (increased eye pressure)     Hypertension     Lung nodule     Sinusitis     Sleep apnea     Thyroid disease     Urinary incontinence     occ urge       PAST SURGICAL HISTORY:  Past Surgical History:   Procedure Laterality Date    ACHILLES TENDON SURGERY      ARTHROSCOPIC CHONDROPLASTY OF KNEE JOINT Right 5/3/2019    Procedure: ARTHROSCOPY, KNEE, WITH CHONDROPLASTY;  Surgeon: Doug Alexander MD;  Location: [facility];  Service: Orthopedics;  Laterality: Right;    ARTHROSCOPY OF KNEE Right 5/3/2019    Procedure: ARTHROSCOPY, KNEE;  Surgeon: Doug Alexander MD;  Location: [facility];  Service: Orthopedics;  Laterality: Right;    CERVIX LESION DESTRUCTION      cryo x 2    CHOLECYSTECTOMY      EXCISION OF MEDIAL MENISCUS OF KNEE Right 5/3/2019    Procedure: MENISCECTOMY, KNEE, MEDIAL;  Surgeon: Doug Alexander MD;  Location: [facility];  Service: Orthopedics;  Laterality: Right;    HYSTERECTOMY      BLAKE/ovaries remain    INJECTION OF JOINT Right 5/3/2019    Procedure: INJECTION, JOINT - SYNVISE INJECTION;  Surgeon: Doug Alexander MD;  Location: [facility];  Service: Orthopedics;  Laterality: Right;  SYNVISE INJECTION FROM PHARMACY    LAMINECTOMY  1990    L5 S1    OOPHORECTOMY      TUBAL LIGATION       Family History   Problem Relation Age of Onset    Cancer Maternal Aunt         cervical    Breast cancer Maternal Aunt     Breast cancer Paternal Grandmother     Diabetes Paternal Grandmother     Stroke Mother     Diabetes Father     Stomach cancer Father     Asthma Sister     Heart disease Brother      Osteoarthritis Sister     No Known Problems Daughter     No Known Problems Daughter     No Known Problems Daughter     Ovarian cancer Neg Hx        SOCIAL HISTORY:  Social History     Socioeconomic History    Marital status:      Spouse name: Not on file    Number of children: Not on file    Years of education: Not on file    Highest education level: Not on file   Occupational History    Occupation: former banking; 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   Social Needs    Financial resource strain: Not on file    Food insecurity:     Worry: Not on file     Inability: Not on file    Transportation needs:     Medical: Not on file     Non-medical: Not on file   Tobacco Use    Smoking status: Former Smoker     Last attempt to quit: 1990     Years since quittin.7    Smokeless tobacco: Never Used   Substance and Sexual Activity    Alcohol use: Yes     Comment: occ    Drug use: No    Sexual activity: Yes   Lifestyle    Physical activity:     Days per week: Not on file     Minutes per session: Not on file    Stress: Not on file   Relationships    Social connections:     Talks on phone: Not on file     Gets together: Not on file     Attends [employer] service: Not on file     Active member of club or organization: Not on file     Attends meetings of clubs or organizations: Not on file     Relationship status: Not on file   Other Topics Concern    Not on file   Social History Narrative    Not on file        ALLERGIES AND MEDICATIONS: updated and reviewed.  Review of patient's allergies indicates:   Allergen Reactions    Percocet [oxycodone-acetaminophen] Itching and Nausea Only     Current Outpatient Medications   Medication Sig Dispense Refill    acetaminophen (TYLENOL) 500 MG tablet Take 500 mg by mouth every 6 (six) hours as needed for Pain.      aspirin (ECOTRIN) 81 MG EC tablet Take 81 mg by mouth.      brimonidine-timolol (COMBIGAN) 0.2-0.5 % Drop Place 1 drop into both eyes.       "cyclobenzaprine (FLEXERIL) 10 MG tablet       meloxicam (MOBIC) 15 MG tablet       metformin (GLUCOPHAGE-XR) 500 MG 24 hr tablet 500 mg 2 (two) times daily with meals.       metoprolol succinate (TOPROL-XL) 25 MG 24 hr tablet       montelukast (SINGULAIR) 10 mg tablet Take 10 mg by mouth every evening.      ondansetron (ZOFRAN-ODT) 4 MG TbDL Take 2 tablets (8 mg total) by mouth every 8 (eight) hours as needed. 10 tablet 1    quinapril (ACCUPRIL) 10 MG tablet Take 10 mg by mouth once daily.      rosuvastatin (CRESTOR) 20 MG tablet TK 1 T PO QD  8    SYNTHROID 112 mcg tablet       timolol maleate 0.5% (TIMOPTIC) 0.5 % Drop        No current facility-administered medications for this visit.        Review of Systems   Constitutional: Negative for fever, malaise/fatigue and weight loss.   HENT: Negative for congestion.    Eyes: Negative for blurred vision and pain.   Respiratory: Negative for shortness of breath and wheezing.    Cardiovascular: Negative for chest pain, palpitations and leg swelling.   Gastrointestinal: Positive for constipation. Negative for abdominal pain, blood in stool, diarrhea and heartburn.   Genitourinary: Negative for dysuria, hematuria and urgency.   Neurological: Negative for focal weakness, weakness and headaches.       Objective:   Physical Exam   Vitals:    02/07/20 1005   BP: 110/60   BP Location: Right arm   Patient Position: Sitting   BP Method: Large (Manual)   Pulse: 70   Temp: 97.8 °F (36.6 °C)   TempSrc: Oral   SpO2: 97%   Weight: 100.2 kg (221 lb)   Height: 5' 5" (1.651 m)    Body mass index is 36.78 kg/m².  Weight: 100.2 kg (221 lb)   Height: 5' 5" (165.1 cm)     Physical Exam   Constitutional: She is oriented to person, place, and time. She appears well-developed and well-nourished.   HENT:   Right Ear: Tympanic membrane, external ear and ear canal normal.   Left Ear: Tympanic membrane, external ear and ear canal normal.   Eyes: Pupils are equal, round, and reactive to " light. EOM are normal. No scleral icterus.   Neck: Neck supple. No thyromegaly present.   Cardiovascular: Normal rate, regular rhythm and normal heart sounds.   No murmur heard.  Pulses:       Dorsalis pedis pulses are 3+ on the right side, and 3+ on the left side.        Posterior tibial pulses are 3+ on the right side, and 3+ on the left side.   Pulmonary/Chest: Effort normal and breath sounds normal. She has no wheezes.   Abdominal: Soft. She exhibits no mass. There is no splenomegaly or hepatomegaly. There is no tenderness.   Musculoskeletal: Normal range of motion. She exhibits no edema or deformity.        Right foot: There is no deformity.        Left foot: There is no deformity.   Feet:   Right Foot:   Protective Sensation: 5 sites tested. 5 sites sensed.   Skin Integrity: Negative for ulcer, blister, skin breakdown, erythema or warmth.   Left Foot:   Protective Sensation: 5 sites tested. 5 sites sensed.   Skin Integrity: Negative for ulcer, blister, skin breakdown, erythema or warmth.   Lymphadenopathy:     She has no cervical adenopathy.   Neurological: She is alert and oriented to person, place, and time. She has normal reflexes.   Skin: Skin is warm and dry. No rash noted.   On exposed skin   Psychiatric: She has a normal mood and affect. Her behavior is normal. Judgment and thought content normal.

## 2020-02-07 ENCOUNTER — LAB VISIT (OUTPATIENT)
Dept: LAB | Facility: HOSPITAL | Age: 70
End: 2020-02-07
Attending: INTERNAL MEDICINE
Payer: MEDICARE

## 2020-02-07 ENCOUNTER — OFFICE VISIT (OUTPATIENT)
Dept: FAMILY MEDICINE | Facility: CLINIC | Age: 70
End: 2020-02-07
Payer: MEDICARE

## 2020-02-07 VITALS
WEIGHT: 221 LBS | DIASTOLIC BLOOD PRESSURE: 60 MMHG | OXYGEN SATURATION: 97 % | HEIGHT: 65 IN | SYSTOLIC BLOOD PRESSURE: 110 MMHG | BODY MASS INDEX: 36.82 KG/M2 | TEMPERATURE: 98 F | HEART RATE: 70 BPM

## 2020-02-07 DIAGNOSIS — E03.8 HYPOTHYROIDISM DUE TO HASHIMOTO'S THYROIDITIS: ICD-10-CM

## 2020-02-07 DIAGNOSIS — Z23 NEED FOR SHINGLES VACCINE: ICD-10-CM

## 2020-02-07 DIAGNOSIS — G89.29 CHRONIC MIDLINE LOW BACK PAIN WITHOUT SCIATICA: ICD-10-CM

## 2020-02-07 DIAGNOSIS — R91.1 PULMONARY NODULE: ICD-10-CM

## 2020-02-07 DIAGNOSIS — Z13.820 SCREENING FOR OSTEOPOROSIS: ICD-10-CM

## 2020-02-07 DIAGNOSIS — I10 ESSENTIAL HYPERTENSION: ICD-10-CM

## 2020-02-07 DIAGNOSIS — M54.50 CHRONIC MIDLINE LOW BACK PAIN WITHOUT SCIATICA: ICD-10-CM

## 2020-02-07 DIAGNOSIS — Z11.59 NEED FOR HEPATITIS C SCREENING TEST: ICD-10-CM

## 2020-02-07 DIAGNOSIS — R91.1 LUNG NODULE: ICD-10-CM

## 2020-02-07 DIAGNOSIS — E11.9 TYPE 2 DIABETES MELLITUS WITHOUT COMPLICATION, WITHOUT LONG-TERM CURRENT USE OF INSULIN: ICD-10-CM

## 2020-02-07 DIAGNOSIS — Z82.49 FAMILY HISTORY OF HEART DISEASE: ICD-10-CM

## 2020-02-07 DIAGNOSIS — E06.3 HYPOTHYROIDISM DUE TO HASHIMOTO'S THYROIDITIS: ICD-10-CM

## 2020-02-07 DIAGNOSIS — H40.89 OTHER SPECIFIED GLAUCOMA, UNSPECIFIED LATERALITY: ICD-10-CM

## 2020-02-07 DIAGNOSIS — M54.16 LEFT LUMBAR RADICULOPATHY: ICD-10-CM

## 2020-02-07 DIAGNOSIS — G47.33 OBSTRUCTIVE SLEEP APNEA: ICD-10-CM

## 2020-02-07 DIAGNOSIS — Z00.00 NORMAL PHYSICAL EXAM: Primary | ICD-10-CM

## 2020-02-07 DIAGNOSIS — Z23 NEED FOR VACCINATION FOR STREP PNEUMONIAE: ICD-10-CM

## 2020-02-07 DIAGNOSIS — E78.5 DYSLIPIDEMIA: ICD-10-CM

## 2020-02-07 LAB
ALBUMIN SERPL BCP-MCNC: 4.2 G/DL (ref 3.5–5.2)
ALP SERPL-CCNC: 80 U/L (ref 55–135)
ALT SERPL W/O P-5'-P-CCNC: 45 U/L (ref 10–44)
ANION GAP SERPL CALC-SCNC: 10 MMOL/L (ref 8–16)
AST SERPL-CCNC: 37 U/L (ref 10–40)
BASOPHILS # BLD AUTO: 0.07 K/UL (ref 0–0.2)
BASOPHILS NFR BLD: 1.3 % (ref 0–1.9)
BILIRUB SERPL-MCNC: 0.5 MG/DL (ref 0.1–1)
BUN SERPL-MCNC: 14 MG/DL (ref 8–23)
CALCIUM SERPL-MCNC: 9.7 MG/DL (ref 8.7–10.5)
CHLORIDE SERPL-SCNC: 107 MMOL/L (ref 95–110)
CHOLEST SERPL-MCNC: 75 MG/DL (ref 120–199)
CHOLEST/HDLC SERPL: 2.3 {RATIO} (ref 2–5)
CO2 SERPL-SCNC: 24 MMOL/L (ref 23–29)
CREAT SERPL-MCNC: 0.8 MG/DL (ref 0.5–1.4)
DIFFERENTIAL METHOD: ABNORMAL
EOSINOPHIL # BLD AUTO: 0.5 K/UL (ref 0–0.5)
EOSINOPHIL NFR BLD: 8.2 % (ref 0–8)
ERYTHROCYTE [DISTWIDTH] IN BLOOD BY AUTOMATED COUNT: 13.4 % (ref 11.5–14.5)
EST. GFR  (AFRICAN AMERICAN): >60 ML/MIN/1.73 M^2
EST. GFR  (NON AFRICAN AMERICAN): >60 ML/MIN/1.73 M^2
ESTIMATED AVG GLUCOSE: 154 MG/DL (ref 68–131)
GLUCOSE SERPL-MCNC: 137 MG/DL (ref 70–110)
HBA1C MFR BLD HPLC: 7 % (ref 4–5.6)
HCT VFR BLD AUTO: 43.8 % (ref 37–48.5)
HDLC SERPL-MCNC: 33 MG/DL (ref 40–75)
HDLC SERPL: 44 % (ref 20–50)
HGB BLD-MCNC: 13.8 G/DL (ref 12–16)
IMM GRANULOCYTES # BLD AUTO: 0.01 K/UL (ref 0–0.04)
IMM GRANULOCYTES NFR BLD AUTO: 0.2 % (ref 0–0.5)
LDLC SERPL CALC-MCNC: 27.2 MG/DL (ref 63–159)
LYMPHOCYTES # BLD AUTO: 2.5 K/UL (ref 1–4.8)
LYMPHOCYTES NFR BLD: 45.4 % (ref 18–48)
MCH RBC QN AUTO: 29.3 PG (ref 27–31)
MCHC RBC AUTO-ENTMCNC: 31.5 G/DL (ref 32–36)
MCV RBC AUTO: 93 FL (ref 82–98)
MONOCYTES # BLD AUTO: 0.5 K/UL (ref 0.3–1)
MONOCYTES NFR BLD: 9.1 % (ref 4–15)
NEUTROPHILS # BLD AUTO: 2 K/UL (ref 1.8–7.7)
NEUTROPHILS NFR BLD: 35.8 % (ref 38–73)
NONHDLC SERPL-MCNC: 42 MG/DL
NRBC BLD-RTO: 0 /100 WBC
PLATELET # BLD AUTO: 186 K/UL (ref 150–350)
PMV BLD AUTO: 12.2 FL (ref 9.2–12.9)
POTASSIUM SERPL-SCNC: 4.5 MMOL/L (ref 3.5–5.1)
PROT SERPL-MCNC: 7.6 G/DL (ref 6–8.4)
RBC # BLD AUTO: 4.71 M/UL (ref 4–5.4)
SODIUM SERPL-SCNC: 141 MMOL/L (ref 136–145)
T4 FREE SERPL-MCNC: 1.48 NG/DL (ref 0.71–1.51)
TRIGL SERPL-MCNC: 74 MG/DL (ref 30–150)
TSH SERPL DL<=0.005 MIU/L-ACNC: 0.27 UIU/ML (ref 0.4–4)
WBC # BLD AUTO: 5.6 K/UL (ref 3.9–12.7)

## 2020-02-07 PROCEDURE — 36415 COLL VENOUS BLD VENIPUNCTURE: CPT | Mod: PO

## 2020-02-07 PROCEDURE — 80053 COMPREHEN METABOLIC PANEL: CPT

## 2020-02-07 PROCEDURE — 84443 ASSAY THYROID STIM HORMONE: CPT

## 2020-02-07 PROCEDURE — 83036 HEMOGLOBIN GLYCOSYLATED A1C: CPT

## 2020-02-07 PROCEDURE — 85025 COMPLETE CBC W/AUTO DIFF WBC: CPT

## 2020-02-07 PROCEDURE — 99204 OFFICE O/P NEW MOD 45 MIN: CPT | Mod: S$PBB,25,, | Performed by: INTERNAL MEDICINE

## 2020-02-07 PROCEDURE — 86803 HEPATITIS C AB TEST: CPT

## 2020-02-07 PROCEDURE — 99213 OFFICE O/P EST LOW 20 MIN: CPT | Mod: PBBFAC,PO | Performed by: INTERNAL MEDICINE

## 2020-02-07 PROCEDURE — 99204 PR OFFICE/OUTPT VISIT, NEW, LEVL IV, 45-59 MIN: ICD-10-PCS | Mod: S$PBB,25,, | Performed by: INTERNAL MEDICINE

## 2020-02-07 PROCEDURE — G0009 ADMIN PNEUMOCOCCAL VACCINE: HCPCS | Mod: PBBFAC,PO

## 2020-02-07 PROCEDURE — 99999 PR PBB SHADOW E&M-EST. PATIENT-LVL III: ICD-10-PCS | Mod: PBBFAC,,, | Performed by: INTERNAL MEDICINE

## 2020-02-07 PROCEDURE — 80061 LIPID PANEL: CPT

## 2020-02-07 PROCEDURE — 99999 PR PBB SHADOW E&M-EST. PATIENT-LVL III: CPT | Mod: PBBFAC,,, | Performed by: INTERNAL MEDICINE

## 2020-02-07 PROCEDURE — 84439 ASSAY OF FREE THYROXINE: CPT

## 2020-02-07 RX ORDER — CYCLOBENZAPRINE HCL 10 MG
10 TABLET ORAL NIGHTLY
Qty: 30 TABLET | Refills: 2 | Status: SHIPPED | OUTPATIENT
Start: 2020-02-07 | End: 2022-01-12

## 2020-02-07 RX ORDER — TIMOLOL MALEATE 5 MG/ML
SOLUTION/ DROPS OPHTHALMIC
COMMUNITY
Start: 2019-01-16 | End: 2020-09-21

## 2020-02-07 RX ORDER — METFORMIN HYDROCHLORIDE 500 MG/1
500 TABLET, EXTENDED RELEASE ORAL 2 TIMES DAILY WITH MEALS
Qty: 180 TABLET | Refills: 3 | Status: SHIPPED | OUTPATIENT
Start: 2020-02-07 | End: 2020-02-07 | Stop reason: SDUPTHER

## 2020-02-07 RX ORDER — METOPROLOL SUCCINATE 25 MG/1
25 TABLET, EXTENDED RELEASE ORAL DAILY
Qty: 90 TABLET | Refills: 3 | Status: SHIPPED | OUTPATIENT
Start: 2020-02-07 | End: 2020-02-07 | Stop reason: SDUPTHER

## 2020-02-07 RX ORDER — LEVOTHYROXINE SODIUM 112 UG/1
112 TABLET ORAL
Qty: 90 TABLET | Refills: 3 | Status: SHIPPED | OUTPATIENT
Start: 2020-02-07 | End: 2020-06-25 | Stop reason: DRUGHIGH

## 2020-02-07 RX ORDER — ASPIRIN 81 MG/1
81 TABLET ORAL
COMMUNITY
End: 2022-01-12

## 2020-02-07 RX ORDER — QUINAPRIL 10 MG/1
10 TABLET ORAL DAILY
Qty: 90 TABLET | Refills: 3 | Status: SHIPPED | OUTPATIENT
Start: 2020-02-07 | End: 2020-09-21 | Stop reason: SDUPTHER

## 2020-02-07 RX ORDER — ROSUVASTATIN CALCIUM 20 MG/1
TABLET, COATED ORAL
Qty: 90 TABLET | Refills: 3 | Status: SHIPPED | OUTPATIENT
Start: 2020-02-07 | End: 2020-09-16 | Stop reason: SDUPTHER

## 2020-02-07 RX ORDER — LEVOTHYROXINE SODIUM 112 UG/1
112 TABLET ORAL
Qty: 90 TABLET | Refills: 3 | Status: SHIPPED | OUTPATIENT
Start: 2020-02-07 | End: 2020-02-07 | Stop reason: SDUPTHER

## 2020-02-07 RX ORDER — ROSUVASTATIN CALCIUM 20 MG/1
TABLET, COATED ORAL
Qty: 90 TABLET | Refills: 3 | Status: SHIPPED | OUTPATIENT
Start: 2020-02-07 | End: 2020-02-07 | Stop reason: SDUPTHER

## 2020-02-07 RX ORDER — METFORMIN HYDROCHLORIDE 500 MG/1
500 TABLET, EXTENDED RELEASE ORAL 2 TIMES DAILY WITH MEALS
Qty: 180 TABLET | Refills: 3 | Status: SHIPPED | OUTPATIENT
Start: 2020-02-07 | End: 2020-09-21 | Stop reason: SDUPTHER

## 2020-02-07 RX ORDER — QUINAPRIL 10 MG/1
10 TABLET ORAL DAILY
Qty: 90 TABLET | Refills: 3 | Status: SHIPPED | OUTPATIENT
Start: 2020-02-07 | End: 2020-02-07 | Stop reason: SDUPTHER

## 2020-02-07 RX ORDER — MELOXICAM 15 MG/1
TABLET ORAL
COMMUNITY
Start: 2019-12-02 | End: 2021-08-16 | Stop reason: ALTCHOICE

## 2020-02-07 RX ORDER — CYCLOBENZAPRINE HCL 10 MG
TABLET ORAL
COMMUNITY
Start: 2020-01-13 | End: 2020-02-07 | Stop reason: SDUPTHER

## 2020-02-07 RX ORDER — METOPROLOL SUCCINATE 25 MG/1
25 TABLET, EXTENDED RELEASE ORAL DAILY
Qty: 90 TABLET | Refills: 3 | Status: SHIPPED | OUTPATIENT
Start: 2020-02-07 | End: 2020-09-21 | Stop reason: SDUPTHER

## 2020-02-08 NOTE — PROGRESS NOTES
TSH low FT4 WNL. On same dose of thyroid rx longstanding  A1c 7.0  CBC WNL  CMP WNL  Lipid very low on crestor 20.   Would repeat lipid when euthyroid  microalbumin negative.    On same dose thyroid. Would repeat in 2 months and if still abn then decrease dose    HCV pending

## 2020-02-10 LAB — HCV AB SERPL QL IA: POSITIVE

## 2020-02-11 ENCOUNTER — TELEPHONE (OUTPATIENT)
Dept: FAMILY MEDICINE | Facility: CLINIC | Age: 70
End: 2020-02-11

## 2020-02-11 ENCOUNTER — LAB VISIT (OUTPATIENT)
Dept: LAB | Facility: HOSPITAL | Age: 70
End: 2020-02-11
Attending: INTERNAL MEDICINE
Payer: MEDICARE

## 2020-02-11 DIAGNOSIS — E03.8 HYPOTHYROIDISM DUE TO HASHIMOTO'S THYROIDITIS: ICD-10-CM

## 2020-02-11 DIAGNOSIS — R76.8 HCV ANTIBODY POSITIVE: ICD-10-CM

## 2020-02-11 DIAGNOSIS — E78.5 DYSLIPIDEMIA: ICD-10-CM

## 2020-02-11 DIAGNOSIS — E06.3 HYPOTHYROIDISM DUE TO HASHIMOTO'S THYROIDITIS: ICD-10-CM

## 2020-02-11 DIAGNOSIS — R76.8 HCV ANTIBODY POSITIVE: Primary | ICD-10-CM

## 2020-02-11 PROCEDURE — 36415 COLL VENOUS BLD VENIPUNCTURE: CPT | Mod: PO

## 2020-02-11 PROCEDURE — 87522 HEPATITIS C REVRS TRNSCRPJ: CPT

## 2020-02-11 NOTE — PROGRESS NOTES
TSH low FT4 WNL. On same dose of thyroid rx longstanding  A1c 7.0  CBC WNL  CMP WNL  Lipid very low on crestor 20.   Would repeat lipid when euthyroid  microalbumin negative.  HCV positive    On same dose thyroid. Would repeat in 2 months and if still abn then decrease dose  Pt notified of results, coming in for HCV PCR/VL

## 2020-02-13 ENCOUNTER — TELEPHONE (OUTPATIENT)
Dept: FAMILY MEDICINE | Facility: CLINIC | Age: 70
End: 2020-02-13

## 2020-02-13 NOTE — TELEPHONE ENCOUNTER
Test still pending.    Spoke with patient and informed of recommendations and patient verbalized understandings.

## 2020-02-13 NOTE — TELEPHONE ENCOUNTER
----- Message from Alok Jasmine sent at 2/13/2020 11:05 AM CST -----  Contact: YESICA LOPEZ [[id number]]  Name of Who is Calling: YESICA LOPEZ [[id number]]      What is the request in detail: Would like to speak with staff in regards to lab results. Please advise      Can the clinic reply by [facility]: no      What Number to Call Back if not in [facility]: [phone]

## 2020-02-14 ENCOUNTER — TELEPHONE (OUTPATIENT)
Dept: FAMILY MEDICINE | Facility: CLINIC | Age: 70
End: 2020-02-14

## 2020-02-14 DIAGNOSIS — Z12.11 COLON CANCER SCREENING: ICD-10-CM

## 2020-02-17 PROBLEM — R76.8 HEPATITIS C ANTIBODY TEST POSITIVE: Status: ACTIVE | Noted: 2020-02-17

## 2020-02-17 LAB
HCV RNA SERPL NAA+PROBE-LOG IU: <1.08 LOG (10) IU/ML
HCV RNA SERPL QL NAA+PROBE: NOT DETECTED IU/ML
HCV RNA SPEC NAA+PROBE-ACNC: <12 IU/ML

## 2020-05-11 PROBLEM — Z13.820 SCREENING FOR OSTEOPOROSIS: Status: RESOLVED | Noted: 2020-02-07 | Resolved: 2020-05-11

## 2020-06-23 ENCOUNTER — TELEPHONE (OUTPATIENT)
Dept: FAMILY MEDICINE | Facility: CLINIC | Age: 70
End: 2020-06-23

## 2020-06-23 DIAGNOSIS — Z12.39 SCREENING FOR MALIGNANT NEOPLASM OF BREAST: ICD-10-CM

## 2020-06-23 DIAGNOSIS — Z12.31 ENCOUNTER FOR SCREENING MAMMOGRAM FOR BREAST CANCER: Primary | ICD-10-CM

## 2020-06-23 NOTE — TELEPHONE ENCOUNTER
----- Message from Alok Jasmine sent at 6/23/2020  1:13 PM CDT -----  Regarding: Orders  Contact: YESICA LOPEZ [[id number]]  Name of Who is Calling: YESICA LOPEZ [[id number]]      What is the request in detail: Patient would like to have her Mammogram orders put in. Patient wanted inform physician she will be having the bottom lobe of her lung removed. Please advise      Can the clinic reply by [facility]: no      What Number [facility] Back [facility]: [phone]

## 2020-06-24 ENCOUNTER — HOSPITAL ENCOUNTER (OUTPATIENT)
Dept: RADIOLOGY | Facility: HOSPITAL | Age: 70
Discharge: HOME | End: 2020-06-24
Attending: INTERNAL MEDICINE
Payer: MEDICARE

## 2020-06-24 DIAGNOSIS — Z12.31 ENCOUNTER FOR SCREENING MAMMOGRAM FOR BREAST CANCER: ICD-10-CM

## 2020-06-24 PROCEDURE — 77063 BREAST TOMOSYNTHESIS BI: CPT | Mod: 26,,, | Performed by: RADIOLOGY

## 2020-06-24 PROCEDURE — 77067 SCR MAMMO BI INCL CAD: CPT | Mod: TC,PO

## 2020-06-24 PROCEDURE — 77067 MAMMO DIGITAL SCREENING BILAT WITH TOMOSYNTHESIS_CAD: ICD-10-PCS | Mod: 26,,, | Performed by: RADIOLOGY

## 2020-06-24 PROCEDURE — 77063 MAMMO DIGITAL SCREENING BILAT WITH TOMOSYNTHESIS_CAD: ICD-10-PCS | Mod: 26,,, | Performed by: RADIOLOGY

## 2020-06-24 PROCEDURE — 77067 SCR MAMMO BI INCL CAD: CPT | Mod: 26,,, | Performed by: RADIOLOGY

## 2020-06-25 DIAGNOSIS — E06.3 HYPOTHYROIDISM DUE TO HASHIMOTO'S THYROIDITIS: ICD-10-CM

## 2020-06-25 DIAGNOSIS — E03.8 HYPOTHYROIDISM DUE TO HASHIMOTO'S THYROIDITIS: ICD-10-CM

## 2020-06-25 DIAGNOSIS — E11.9 TYPE 2 DIABETES MELLITUS WITHOUT COMPLICATION, WITHOUT LONG-TERM CURRENT USE OF INSULIN: Primary | ICD-10-CM

## 2020-06-25 RX ORDER — LEVOTHYROXINE SODIUM 100 UG/1
100 TABLET ORAL
Qty: 30 TABLET | Refills: 11 | Status: SHIPPED | OUTPATIENT
Start: 2020-06-25 | End: 2020-07-08 | Stop reason: SDUPTHER

## 2020-07-02 ENCOUNTER — TELEPHONE (OUTPATIENT)
Dept: FAMILY MEDICINE | Facility: CLINIC | Age: 70
End: 2020-07-02

## 2020-07-02 NOTE — TELEPHONE ENCOUNTER
----- Message from Andra Sin sent at 7/2/2020 12:58 PM CDT -----  Regarding: Sooner Appointment Request  Contact: Patient  Type:  Sooner Appointment Request    Patient is requesting a sooner appointment.  Patient declined first available appointment listed as well as another facility and provider .  Patient will not accept being placed on the waitlist and is requesting a message be sent to doctor.    Name of Caller: Patient     When is the first available appointment? 8/5/2020    Symptoms:  follow up / surgery clearance     Would the patient rather a call back or a response via [facility]? Call back     Best Call Back Number: [phone]

## 2020-07-02 NOTE — TELEPHONE ENCOUNTER
Okay to schedule with another provider if unable to get in office visit with Dr. Phillips.    Thanks!  TIFFANIE Barry

## 2020-07-02 NOTE — TELEPHONE ENCOUNTER
Patient states that she wants to have an appointment for follow up.     patient is scheduled for virtual 07/07/2020.  Patient then states that she prefers to come in and let Dr. Phillips listen to her chest.     She states that she can not wait til August as her surgery is scheduled for 07/31/2020. Even though she has been cleared for surgery she wants Dr. Phillips just to check to make sure its okay and not her anxiety.

## 2020-07-07 NOTE — PROGRESS NOTES
This note was created by combination of typed  and M-Modal dictation.  Transcription errors may be present.  If there are any questions, please contact me.    Assessment:     1. Adjustment disorder, unspecified type  clonazePAM (KLONOPIN) 0.5 MG tablet   2. Lung nodule s/p CT guided bx 9/2019 benign; 6/2020 bx atypical adenomatous hyperplasia     3. Family history of heart disease Dr. Arredondo     4. Type 2 diabetes mellitus without complication, without long-term current use of insulin     5. Hypothyroidism due to Hashimoto's thyroiditis  levothyroxine (SYNTHROID) 100 MCG tablet       Plan:   1. After discussion small rx for klonopin. SE profile discussed.  Hx of diazepam for MRI and also during time of her father's illness so she is familiar with BZDs and their SE.  She is wary of hydroxyzine.  2. Upcoming surgery with Óscar.  She is wondering about 2nd opinion. We discussed her proposed surgery at length. I offered her 2nd opinion, after discussion, she has not elected it at this time, will discuss with her , if she wants the 2nd opinion she'll contact me for referral to CT surgery.  3. Followed by cards; neg stress test  4. Check a1c with next labs  5. Check future labs on  down from 112.        Medications Discontinued During This Encounter   Medication Reason    levothyroxine (SYNTHROID) 100 MCG tablet Reorder       meds sent this encounter:  Medications Ordered This Encounter   Medications    clonazePAM (KLONOPIN) 0.5 MG tablet     Sig: Take 1 tablet (0.5 mg total) by mouth every evening.     Dispense:  14 tablet     Refill:  0    levothyroxine (SYNTHROID) 100 MCG tablet     Sig: Take 1 tablet (100 mcg total) by mouth before breakfast.     Dispense:  30 tablet     Refill:  11       Follow Up: No follow-ups on file. repeat TSH and A1c 2 months on lower dose       Subjective:     Chief Complaint   Patient presents with    Follow-up       HPI  Alvina is a 69 y.o. female, last  "appointment with this clinic was 2020.    No LMP recorded. Patient has had a hysterectomy.    TSH low FT4 WNL. On same dose of thyroid rx longstanding  A1c 7.0  CBC WNL  CMP WNL  Lipid very low on crestor 20.   Would repeat lipid when euthyroid  microalbumin negative.  HCV positive  HCV VL negative - either exposed and immune, or false positive  Results to pt. No further testing     labs  Lipid better  TSH still low.    20 nu med stress test  Nuclear scans show a small area of possible anterior wall ischemia. This could also be secondary to breast artefact. Clinical      correlation suggested.     Gated scan is wnl. EF 80%     Ekg portion of test is negative     20 lung bx  LUNG, NEEDLE BIOPSY, CLINICALLY LEFT LOWER LOBE:  --ATYPICAL ADENOMATOUS HYPERPLASIA ( 0.5 MM LESION, AS MEASURED ON   SLIDE).    On levothyroxine longstanding, same dose, takes fasting every AM with water.   She was taking 112 and I had changed her to 100.  However I sent it into the wrong pharmacy.  So she never started on the 100.  She is still taking the 112.  I will start her on 100 period and repeat the labs again in about 2 months.    Upcoming resection [facility]LL Dr. 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    Father's side - numerous family members  of cancer.  Father had had stomach CA.  Maternal aunt with lung CA.  Mother's side with heart issues.     She is inquiring about a second opinion about the surgery.  She has been seeing Dr. Cantrell.  Mainly because her father when he was going through his cancer care, apparently the surgery was not able to get clean margins; he went to [facility] and they said "we can't do anything for you since you've already had surgery.  If you had come before surgery we might have been able to do something".    We discussed her proposed surgery at " length.  They are proposing lobectomy.  She wonders if this is too aggressive.  However it is my understanding that this is appropriate standard of care.    She is extremely anxious about both the diagnosis as well as the upcoming surgery.  Is wary of hydroxyzine.  Is familiar with valium b/c took it prior to the MRI.     Answers for HPI/ROS submitted by the patient on 7/8/2020   activity change: No  unexpected weight change: No  rhinorrhea: Yes  trouble swallowing: No  visual disturbance: No  chest tightness: Yes  polyuria: No  difficulty urinating: No  menstrual problem: No  joint swelling: No  arthralgias: No  confusion: No  dysphoric mood: No      Patient Care Team:  Clement Phillips MD as PCP - General (Internal Medicine)  Yuriy Amor MD as Consulting Physician (Internal Medicine)  Radha Calix MD (Pulmonary Disease)  Edmund Cantrell MD as Consulting Physician (Cardiothoracic Surgery)  Wilbert Arredondo MD (Cardiology)  Chris Muro MD as Consulting Physician (Ophthalmology)  [facility] (Gastroenterology)    Patient Active Problem List    Diagnosis Date Noted    Type 2 diabetes mellitus without complication, without long-term current use of insulin 06/25/2020    Hepatitis C antibody test positive but VL negative, either exposed/immune or false positive initial screening 02/17/2020    Lung nodule s/p CT guided bx 9/2019 benign; 6/2020 bx atypical adenomatous hyperplasia 02/07/2020     History of left pulmonary nodule.  Seen by outside CT surgery.  Had previously been sent to intervention Radiology but was unable to biopsy the lesion.  Increased in size from initial 0.7-1.3 cm over 3 years.  Plan is to monitor.  CT surgery was not convinced that he would be able to find it in surgery for a wedge resection.  In which case it might be lobectomy.  9/12/19 CT-guided biopsy:  LUNG, LEFT LOWER LOBE, CORE BIOPSY:   - SMALL FRAGMENTS OF BENIGN PULMONARY  ALVEOLAR PARENCHYMA.   - NO EVIDENCE OF NEOPLASM OR GRANULOMATOUS DISEASE.   6/11/20 lung bx  LUNG, NEEDLE BIOPSY, CLINICALLY LEFT LOWER LOBE:  --ATYPICAL ADENOMATOUS HYPERPLASIA ( 0.5 MM LESION, AS MEASURED ON   SLIDE).      Family history of heart disease Dr. Arredondo 02/07/2020     10/03/2013 nuclear medicine stress test no significant EKG changes.  No chest pain. Normal perfusion scan.  Normal LV systolic function  10/03/2013 TTE normal LV size wall thickness and systolic function.  05/19/17 Nuclear stress test: Five minutes standard Dario protocol. 1 mm horizontal ST depression. Substernal chest heaviness and tightness, resolved in recovery. Perfusion scan normal.  05/17 Echocardiogram shows normal cardiac function. EF normal. Greater than 50%. Grade I diastolic function.  02/02/18 Coronary calcium score was 46 with 41 in the LAD, circumflex was 5. Her liver measured 41 Hounsfield units. The spleen could not be visualized. These findings are consistent with fatty liver. The textural appearance to me of the liver was that of fatty liver. She had normal origin of the coronary arteries, normal configuration of the pulmonary veins, left atrium 35 mm, calcified plaque was also seen in the aortic annulus. Per Radiology, she had a 1 cm nodule in the left lower lobe. Radiology recommended followup in six months with CT.  6/22/20 nu med stress test  Nuclear scans show a small area of possible anterior wall ischemia. This could also be secondary to breast artefact. Clinical      correlation suggested.     Gated scan is wnl. EF 80%     Ekg portion of test is negative         Other specified glaucoma 02/07/2020    Chronic midline low back pain without sciatica hx of laminectomy L5S1; flexeril PRN 02/07/2020    Left lumbar radiculopathy from L sciatica, remote laminectomy but residual weakness/numbness L leg 02/07/2020    Arthritis of knee, right 05/03/2019    Complex tear of medial meniscus of right knee as current  injury 05/03/2019    Acute lateral meniscus tear of right knee 05/03/2019    Mixed incontinence urge and stress (male)(female) 03/05/2013    Cystocele 03/05/2013    Rectocele 03/05/2013    Chronic constipation 03/05/2013    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       PAST MEDICAL HISTORY:  Past Medical History:   Diagnosis Date    Achilles tendon tear     Diabetes mellitus     Dysplasia of cervix     GERD (gastroesophageal reflux disease)     Glaucoma (increased eye pressure)     Hypertension     Lung nodule     Sinusitis     Sleep apnea     Thyroid disease     Urinary incontinence     occ urge       PAST SURGICAL HISTORY:  Past Surgical History:   Procedure Laterality Date    ACHILLES TENDON SURGERY      ARTHROSCOPIC CHONDROPLASTY OF KNEE JOINT Right 5/3/2019    Procedure: ARTHROSCOPY, KNEE, WITH CHONDROPLASTY;  Surgeon: Doug Alexander MD;  Location: [facility];  Service: Orthopedics;  Laterality: Right;    ARTHROSCOPY OF KNEE Right 5/3/2019    Procedure: ARTHROSCOPY, KNEE;  Surgeon: Doug Alexander MD;  Location: [facility];  Service: Orthopedics;  Laterality: Right;    CERVIX LESION DESTRUCTION      cryo x 2    CHOLECYSTECTOMY      EXCISION OF MEDIAL MENISCUS OF KNEE Right 5/3/2019    Procedure: MENISCECTOMY, KNEE, MEDIAL;  Surgeon: Doug Alexander MD;  Location: [facility];  Service: Orthopedics;  Laterality: Right;    HYSTERECTOMY      BLAKE/ovaries remain    INJECTION OF JOINT Right 5/3/2019    Procedure: INJECTION, JOINT - SYNVISE INJECTION;  Surgeon: Doug Alexander MD;  Location: [facility];  Service: Orthopedics;  Laterality: Right;  SYNVISE INJECTION FROM PHARMACY    LAMINECTOMY  1990    L5 S1    OOPHORECTOMY      TUBAL LIGATION         SOCIAL HISTORY:  Social History     Socioeconomic History    Marital status:      Spouse name: Not on file    Number of children: Not on file    Years of education: Not on file    Highest education level: Not on file   Occupational History     Occupation: former banking; 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   Social Needs    Financial resource strain: Not very hard    Food insecurity     Worry: Never true     Inability: Never true    Transportation needs     Medical: No     Non-medical: No   Tobacco Use    Smoking status: Former Smoker     Quit date: 1990     Years since quittin.2    Smokeless tobacco: Never Used   Substance and Sexual Activity    Alcohol use: Yes     Frequency: 2-4 times a month     Drinks per session: 1 or 2     Binge frequency: Never     Comment: occ    Drug use: No    Sexual activity: Yes   Lifestyle    Physical activity     Days per week: 0 days     Minutes per session: Not on file    Stress: Very much   Relationships    Social connections     Talks on phone: More than three times a week     Gets together: Twice a week     Attends Congregation service: Not on file     Active member of club or organization: No     Attends meetings of clubs or organizations: Not on file     Relationship status:    Other Topics Concern    Not on file   Social History Narrative    Not on file        ALLERGIES AND MEDICATIONS: updated and reviewed.  Review of patient's allergies indicates:   Allergen Reactions    Percocet [oxycodone-acetaminophen] Itching and Nausea Only     Current Outpatient Medications   Medication Sig Dispense Refill    acetaminophen (TYLENOL) 500 MG tablet Take 500 mg by mouth every 6 (six) hours as needed for Pain.      aspirin (ECOTRIN) 81 MG EC tablet Take 81 mg by mouth.      brimonidine 0.2% (ALPHAGAN) 0.2 % Drop       brimonidine-timolol (COMBIGAN) 0.2-0.5 % Drop Place 1 drop into both eyes.      cyclobenzaprine (FLEXERIL) 10 MG tablet Take 1 tablet (10 mg total) by mouth every evening. 30 tablet 2    dorzolamide-timolol 2-0.5% (COSOPT) 22.3-6.8 mg/mL ophthalmic solution       levothyroxine (SYNTHROID) 100 MCG tablet Take 1 tablet (100 mcg total) by mouth before breakfast. 30 tablet 11    meloxicam  "(MOBIC) 15 MG tablet       metFORMIN (GLUCOPHAGE-XR) 500 MG XR 24hr tablet Take 1 tablet (500 mg total) by mouth 2 (two) times daily with meals. 180 tablet 3    metoprolol succinate (TOPROL-XL) 25 MG 24 hr tablet Take 1 tablet (25 mg total) by mouth once daily. 90 tablet 3    montelukast (SINGULAIR) 10 mg tablet Take 10 mg by mouth every evening.      ondansetron (ZOFRAN-ODT) 4 MG TbDL Take 2 tablets (8 mg total) by mouth every 8 (eight) hours as needed. 10 tablet 1    quinapril (ACCUPRIL) 10 MG tablet Take 1 tablet (10 mg total) by mouth once daily. 90 tablet 3    rosuvastatin (CRESTOR) 20 MG tablet TK 1 T PO QD 90 tablet 3    RHOPRESSA 0.02 % ophthalmic solution       timolol maleate 0.5% (TIMOPTIC) 0.5 % Drop        No current facility-administered medications for this visit.        Review of Systems   HENT: Negative for hearing loss.    Eyes: Negative for discharge.   Respiratory: Negative for wheezing.    Cardiovascular: Negative for chest pain and palpitations.   Gastrointestinal: Positive for constipation. Negative for blood in stool, diarrhea and vomiting.   Genitourinary: Negative for dysuria and hematuria.   Musculoskeletal: Negative for neck pain.   Neurological: Positive for weakness. Negative for headaches.   Endo/Heme/Allergies: Negative for polydipsia.       Objective:   Physical Exam   Vitals:    07/08/20 1022   BP: 134/76   BP Location: Left arm   Patient Position: Sitting   BP Method: Large (Automatic)   Pulse: 64   Temp: 98.6 °F (37 °C)   TempSrc: Other (see comments)   SpO2: 97%   Weight: 98 kg (216 lb)   Height: 5' 5" (1.651 m)    Body mass index is 35.94 kg/m².  Weight: 98 kg (216 lb)   Height: 5' 5" (165.1 cm)     Physical Exam  Constitutional:       General: She is not in acute distress.     Appearance: She is well-developed.   HENT:      Head: Normocephalic and atraumatic.   Eyes:      General: No scleral icterus.  Pulmonary:      Effort: Pulmonary effort is normal.   Skin:     " General: Skin is warm and dry.   Neurological:      Mental Status: She is alert and oriented to person, place, and time.   Psychiatric:         Behavior: Behavior normal.         Thought Content: Thought content normal.         Component      Latest Ref Rng & Units 6/24/2020   Cholesterol      120 - 199 mg/dL 194   Triglycerides      30 - 150 mg/dL 210 (H)   HDL      40 - 75 mg/dL 38 (L)   LDL Cholesterol External      63.0 - 159.0 mg/dL 114.0   Hdl/Cholesterol Ratio      20.0 - 50.0 % 19.6 (L)   Total Cholesterol/HDL Ratio      2.0 - 5.0 5.1 (H)   Non-HDL Cholesterol      mg/dL 156   TSH      0.400 - 4.000 uIU/mL 0.090 (L)   Free T4      0.71 - 1.51 ng/dL 1.20

## 2020-07-08 ENCOUNTER — OFFICE VISIT (OUTPATIENT)
Dept: FAMILY MEDICINE | Facility: CLINIC | Age: 70
End: 2020-07-08
Payer: MEDICARE

## 2020-07-08 VITALS
OXYGEN SATURATION: 97 % | TEMPERATURE: 99 F | HEIGHT: 65 IN | DIASTOLIC BLOOD PRESSURE: 76 MMHG | WEIGHT: 216 LBS | BODY MASS INDEX: 35.99 KG/M2 | SYSTOLIC BLOOD PRESSURE: 134 MMHG | HEART RATE: 64 BPM

## 2020-07-08 DIAGNOSIS — R91.1 LUNG NODULE: ICD-10-CM

## 2020-07-08 DIAGNOSIS — Z82.49 FAMILY HISTORY OF HEART DISEASE: ICD-10-CM

## 2020-07-08 DIAGNOSIS — E03.8 HYPOTHYROIDISM DUE TO HASHIMOTO'S THYROIDITIS: ICD-10-CM

## 2020-07-08 DIAGNOSIS — F43.20 ADJUSTMENT DISORDER, UNSPECIFIED TYPE: Primary | ICD-10-CM

## 2020-07-08 DIAGNOSIS — E06.3 HYPOTHYROIDISM DUE TO HASHIMOTO'S THYROIDITIS: ICD-10-CM

## 2020-07-08 DIAGNOSIS — E11.9 TYPE 2 DIABETES MELLITUS WITHOUT COMPLICATION, WITHOUT LONG-TERM CURRENT USE OF INSULIN: ICD-10-CM

## 2020-07-08 PROCEDURE — 99215 OFFICE O/P EST HI 40 MIN: CPT | Mod: PBBFAC,PO | Performed by: INTERNAL MEDICINE

## 2020-07-08 PROCEDURE — 99999 PR PBB SHADOW E&M-EST. PATIENT-LVL V: CPT | Mod: PBBFAC,,, | Performed by: INTERNAL MEDICINE

## 2020-07-08 PROCEDURE — 99214 OFFICE O/P EST MOD 30 MIN: CPT | Mod: S$PBB,,, | Performed by: INTERNAL MEDICINE

## 2020-07-08 PROCEDURE — 99214 PR OFFICE/OUTPT VISIT, EST, LEVL IV, 30-39 MIN: ICD-10-PCS | Mod: S$PBB,,, | Performed by: INTERNAL MEDICINE

## 2020-07-08 PROCEDURE — 99999 PR PBB SHADOW E&M-EST. PATIENT-LVL V: ICD-10-PCS | Mod: PBBFAC,,, | Performed by: INTERNAL MEDICINE

## 2020-07-08 RX ORDER — LEVOTHYROXINE SODIUM 100 UG/1
100 TABLET ORAL
Qty: 30 TABLET | Refills: 11 | Status: SHIPPED | OUTPATIENT
Start: 2020-07-08 | End: 2020-09-21 | Stop reason: SDUPTHER

## 2020-07-08 RX ORDER — DORZOLAMIDE HYDROCHLORIDE AND TIMOLOL MALEATE 20; 5 MG/ML; MG/ML
SOLUTION/ DROPS OPHTHALMIC
COMMUNITY
Start: 2020-06-30

## 2020-07-08 RX ORDER — NETARSUDIL 0.2 MG/ML
SOLUTION/ DROPS OPHTHALMIC; TOPICAL
COMMUNITY
Start: 2020-06-23 | End: 2021-02-11

## 2020-07-08 RX ORDER — CLONAZEPAM 0.5 MG/1
0.5 TABLET ORAL NIGHTLY
Qty: 14 TABLET | Refills: 0 | Status: SHIPPED | OUTPATIENT
Start: 2020-07-08 | End: 2020-07-17 | Stop reason: ALTCHOICE

## 2020-07-08 RX ORDER — BRIMONIDINE TARTRATE 2 MG/ML
SOLUTION/ DROPS OPHTHALMIC
COMMUNITY
Start: 2020-06-30

## 2020-07-17 ENCOUNTER — TELEPHONE (OUTPATIENT)
Dept: FAMILY MEDICINE | Facility: CLINIC | Age: 70
End: 2020-07-17

## 2020-07-17 DIAGNOSIS — F43.20 ADJUSTMENT DISORDER, UNSPECIFIED TYPE: Primary | ICD-10-CM

## 2020-07-17 RX ORDER — DIAZEPAM 5 MG/1
5 TABLET ORAL NIGHTLY PRN
Qty: 14 TABLET | Refills: 0 | Status: SHIPPED | OUTPATIENT
Start: 2020-07-17 | End: 2021-08-16 | Stop reason: SDUPTHER

## 2020-07-17 NOTE — TELEPHONE ENCOUNTER
----- Message from Marie Gee sent at 7/17/2020 11:19 AM CDT -----  Name of Who is Calling: YESICA LOPEZ [[id number]]      What is the request in detail: Pt is calling to speak to staff in regards to discussing a script that was prescribed .... Please call to further assist .       Can the clinic reply by [facility]: N      What Number to Call Back if not in [facility]: [phone]

## 2020-07-17 NOTE — TELEPHONE ENCOUNTER
Spoke with pt she is requesting valium be sent to her pharmacy.Pt states she has taken Valium before and isn't comfortable taking clonazePAM.Please advise.

## 2020-08-06 ENCOUNTER — PATIENT OUTREACH (OUTPATIENT)
Dept: ADMINISTRATIVE | Facility: HOSPITAL | Age: 70
End: 2020-08-06

## 2020-08-19 ENCOUNTER — PATIENT OUTREACH (OUTPATIENT)
Dept: ADMINISTRATIVE | Facility: HOSPITAL | Age: 70
End: 2020-08-19

## 2020-08-19 PROBLEM — K63.5 HYPERPLASTIC POLYP OF SIGMOID COLON: Status: ACTIVE | Noted: 2020-08-19

## 2020-08-19 PROBLEM — K29.30 CHRONIC SUPERFICIAL GASTRITIS WITHOUT BLEEDING: Status: ACTIVE | Noted: 2020-08-19

## 2020-09-16 ENCOUNTER — TELEPHONE (OUTPATIENT)
Dept: FAMILY MEDICINE | Facility: CLINIC | Age: 70
End: 2020-09-16

## 2020-09-16 DIAGNOSIS — E78.5 DYSLIPIDEMIA: ICD-10-CM

## 2020-09-16 RX ORDER — ROSUVASTATIN CALCIUM 20 MG/1
TABLET, COATED ORAL
Qty: 90 TABLET | Refills: 3 | Status: SHIPPED | OUTPATIENT
Start: 2020-09-16 | End: 2020-09-21 | Stop reason: CLARIF

## 2020-09-16 NOTE — TELEPHONE ENCOUNTER
Below information given to patient, verbalized   understanding. States send to [facility]

## 2020-09-16 NOTE — TELEPHONE ENCOUNTER
I'm surprised. Rosuvastatin is generic and should not be that expensive.    [facility] has it for $20 using [employer] if she wants me to send it there

## 2020-09-16 NOTE — TELEPHONE ENCOUNTER
Spoke with patient and she informed me that she has lung problems and she has not been out of her home since March. She said that she checked with her insurance and they would cover Generic Crestor are Zocor for 3 dollars and she will be able to get this though mail order.

## 2020-09-16 NOTE — TELEPHONE ENCOUNTER
----- Message from Sendy Redmond sent at 9/16/2020  8:47 AM CDT -----  Regarding: inquiry  Type: Patient Call Back    Who called: self    What is the request in detail:patient would like a call regarding upcoming appt's and medication    Can the clinic reply by [facility]?no    Would the patient rather a call back or a response via [facility]?   Best call back number:[phone]

## 2020-09-16 NOTE — TELEPHONE ENCOUNTER
Patient states that she when she called in for her refill for Rosuvastatin. The pharmacy told her that the cost would be $117.00. Patient states that she then called the insurance company and they told her to ask provider about Simvastatin with cost at $0.00 or pravastatin at cost $3.00.       Please address the patient concerns.    Thanks,  Jacqueline

## 2020-09-17 ENCOUNTER — LAB VISIT (OUTPATIENT)
Dept: LAB | Facility: HOSPITAL | Age: 70
End: 2020-09-17
Attending: INTERNAL MEDICINE
Payer: MEDICARE

## 2020-09-17 DIAGNOSIS — E11.9 TYPE 2 DIABETES MELLITUS WITHOUT COMPLICATION, WITHOUT LONG-TERM CURRENT USE OF INSULIN: ICD-10-CM

## 2020-09-17 DIAGNOSIS — E06.3 HYPOTHYROIDISM DUE TO HASHIMOTO'S THYROIDITIS: ICD-10-CM

## 2020-09-17 DIAGNOSIS — E03.8 HYPOTHYROIDISM DUE TO HASHIMOTO'S THYROIDITIS: ICD-10-CM

## 2020-09-17 LAB
ESTIMATED AVG GLUCOSE: 146 MG/DL (ref 68–131)
HBA1C MFR BLD HPLC: 6.7 % (ref 4–5.6)

## 2020-09-17 PROCEDURE — 84443 ASSAY THYROID STIM HORMONE: CPT

## 2020-09-17 PROCEDURE — 83036 HEMOGLOBIN GLYCOSYLATED A1C: CPT

## 2020-09-17 PROCEDURE — 36415 COLL VENOUS BLD VENIPUNCTURE: CPT | Mod: PO

## 2020-09-18 PROBLEM — D3A.090 CARCINOID TUMOR OF LUNG: Status: ACTIVE | Noted: 2020-02-07

## 2020-09-18 LAB — TSH SERPL DL<=0.005 MIU/L-ACNC: 0.9 UIU/ML (ref 0.4–4)

## 2020-09-19 NOTE — PROGRESS NOTES
This note was created by combination of typed  and M-Modal dictation.  Transcription errors may be present.  If there are any questions, please contact me.    Assessment and Plan:   Type 2 diabetes mellitus without complication, without long-term current use of insulin  -stable on metformin extended release.  A1c was good.  No changes.  Refill to pharmacy.  Future labs ordered.  She gets her eye exams done at [facility] will try to get old records.  -     metFORMIN (GLUCOPHAGE-XR) 500 MG ER 24hr tablet; Take 1 tablet (500 mg total) by mouth 2 (two) times daily with meals.  Dispense: 180 tablet; Refill: 3  -     Comprehensive metabolic panel; Future; Expected date: 03/20/2021  -     Lipid Panel; Future; Expected date: 03/20/2021  -     Hemoglobin A1C; Future; Expected date: 03/20/2021  -     Microalbumin/creatinine urine ratio; Future; Expected date: 03/20/2021  -     CBC auto differential; Future; Expected date: 03/20/2021    Dyslipidemia  -insurance apparently does not cover rosuvastatin.  She would like to try cheaper alternative.  Zocor is apparently covered.  History of Lipitor with side effects of myalgias.  Trial of Zocor 40 sent to pharmacy.  If she experiences myalgias would try to get exception for Crestor.  -     simvastatin (ZOCOR) 40 MG tablet; Take 1 tablet (40 mg total) by mouth every evening.  Dispense: 90 tablet; Refill: 3    Hypothyroidism due to Hashimoto's thyroiditis  -TSH good on current dose, no changes, refilled to mail order.  -     levothyroxine (SYNTHROID) 100 MCG tablet; Take 1 tablet (100 mcg total) by mouth before breakfast.  Dispense: 90 tablet; Refill: 3  -     TSH; Future; Expected date: 03/21/2021    Carcinoid tumor of lung, unspecified whether malignant  -followed by oncology.  Status post resection in late July.  Slowly healing from that.    Needs flu shot  -     Influenza - Quadrivalent (Adjuvanted)    Essential hypertension  -stable, quinapril, Toprol XL, refill to  pharmacy.  -     quinapriL (ACCUPRIL) 10 MG tablet; Take 1 tablet (10 mg total) by mouth once daily.  Dispense: 90 tablet; Refill: 3  -     metoprolol succinate (TOPROL-XL) 25 MG 24 hr tablet; Take 1 tablet (25 mg total) by mouth once daily.  Dispense: 90 tablet; Refill: 3          Medications Discontinued During This Encounter   Medication Reason    timolol maleate 0.5% (TIMOPTIC) 0.5 % Drop     rosuvastatin (CRESTOR) 20 MG tablet Formulary change    metFORMIN (GLUCOPHAGE) 500 MG tablet Therapy completed    levothyroxine (SYNTHROID) 100 MCG tablet Reorder    metoprolol succinate (TOPROL-XL) 25 MG 24 hr tablet Reorder    quinapril (ACCUPRIL) 10 MG tablet Reorder    metFORMIN (GLUCOPHAGE-XR) 500 MG XR 24hr tablet Reorder       meds sent this encounter:  Medications Ordered This Encounter   Medications    levothyroxine (SYNTHROID) 100 MCG tablet     Sig: Take 1 tablet (100 mcg total) by mouth before breakfast.     Dispense:  90 tablet     Refill:  3    metFORMIN (GLUCOPHAGE-XR) 500 MG ER 24hr tablet     Sig: Take 1 tablet (500 mg total) by mouth 2 (two) times daily with meals.     Dispense:  180 tablet     Refill:  3    metoprolol succinate (TOPROL-XL) 25 MG 24 hr tablet     Sig: Take 1 tablet (25 mg total) by mouth once daily.     Dispense:  90 tablet     Refill:  3     .    quinapriL (ACCUPRIL) 10 MG tablet     Sig: Take 1 tablet (10 mg total) by mouth once daily.     Dispense:  90 tablet     Refill:  3    simvastatin (ZOCOR) 40 MG tablet     Sig: Take 1 tablet (40 mg total) by mouth every evening.     Dispense:  90 tablet     Refill:  3       Follow Up: No follow-ups on file. f/u 6 months. Recall entered; labs ordered      Subjective:     Chief Complaint   Patient presents with    Diabetes    Hypothyroidism       TIFFANY Tinsley is a 69 y.o. female, last appointment with this clinic was 7/8/2020.    No LMP recorded. Patient has had a hysterectomy.    Carcinoid tumor of the lung; per heme onc note:  she  had a repeat biopsy on June 11, 2020 and it showed atypical adenomatous hyperplasia measuring 0.5 mm. The WHO classification of Lung Tumors classifies a lepidic tumor of less than 5 mm as Atypical Adenomatous Hyperplasia. Tumors between 5 mm and 30 mm with only a lepidic pattern would be classified as Adenocarcinoma-in-Situ . By clinical information this lesion is 1.5 cm , thus clinically may be / is at least an Adenocarcinoma-in Situ.   She underwent on July 31, 2020 a left lower lobe wedge resection of the mass with completion robotic lobectomy and mediastinal lymph node dissection.  The final pathology report showed the presence of low-grade carcinoid tumor measuring 1.20 cm that was low-grade with a Ki-67 of 3%. The margins were negative. The lymph node at the level 7, 8, 10 and 11 came negative for metastatic disease. There was no direct invasion of the adjacent structure no lymphovascular invasion. Her final stage was a pT1 pN0 pMX.    9/10/20 PET/CT neuroendocrine study:  1.   Postoperative changes of recent left lower lobe pulmonary nodule wedge resection with mild metabolic activity along the lateral margin of the suture line likely being postoperative in nature. Continued attention at this site is warranted on future imaging.  2.   No evidence of metastatic disease identified.    preOV labs a1c 6.7  TSH WNL    Still recovering from the lung resection. The pain is improving but only really started getting better in the last week.  Previously a sticking sensation when she rolled over in her sleep. Nothing for pain other than tylenol. Feels overall it's manageable. Not requesting anything stronger for this. Has opioids at home if she needs it but she does not want to take it.     Plan is repeat imaging and labs in about 4 months.     crestor seems to have increased in price. Apparently zocor is preferred.  She recalls trying lipitor in the past with SE of leg cramping.     Feeling fatigued.  No snoring.  Lost  some weight.     Eye exam with [facility]. Dr. Muro but last check was with Dr. Bansal at [facility].    Patient Care Team:  Clement Phillips MD as PCP - General (Internal Medicine)  Yuriy Amor MD as Consulting Physician (Internal Medicine)  Radha Calix MD (Pulmonary Disease)  Edmund Cantrell MD as Consulting Physician (Cardiothoracic Surgery)  Wilbert Arredondo MD (Cardiology)  Chris Muro MD as Consulting Physician (Ophthalmology)  [facility] (Gastroenterology)  Basilia Medley MA as Care Coordinator    Patient Active Problem List    Diagnosis Date Noted    Hyperplastic polyp of sigmoid colon 08/19/2020 8/31/2017 Colonoscopy 3 mm rectal polyp.  Large internal hemorrhoids.  Hyperplastic polyp      Chronic superficial gastritis without bleeding on EGD 2017 08/19/2020 8/31/2017 EGD normal esophagus.  Patchy mild erythema of antrum.  Biopsied.  Normal duodenum.  Biopsy mild chronic gastritis H pylori negative      Hypothyroidism due to Hashimoto's thyroiditis 07/08/2020    Type 2 diabetes mellitus without complication, without long-term current use of insulin 06/25/2020    Hepatitis C antibody test positive but VL negative, either exposed/immune or false positive initial screening 02/17/2020    Carcinoid tumor of lung 02/07/2020     History of left pulmonary nodule.  Seen by outside CT surgery.  Had previously been sent to intervention Radiology but was unable to biopsy the lesion.  Increased in size from initial 0.7-1.3 cm over 3 years.    9/12/19 CT-guided biopsy:  LUNG, LEFT LOWER LOBE, CORE BIOPSY:   - SMALL FRAGMENTS OF BENIGN PULMONARY ALVEOLAR PARENCHYMA.   - NO EVIDENCE OF NEOPLASM OR GRANULOMATOUS DISEASE.   6/11/20 lung bx  LUNG, NEEDLE BIOPSY, CLINICALLY LEFT LOWER LOBE:  --ATYPICAL ADENOMATOUS HYPERPLASIA ( 0.5 MM LESION, AS MEASURED ON   SLIDE).   She underwent on July 31, 2020 a left lower lobe wedge resection of the mass  with completion robotic lobectomy and mediastinal lymph node dissection.  The final pathology report showed the presence of low-grade carcinoid tumor measuring 1.20 cm that was low-grade with a Ki-67 of 3%. The margins were negative. The lymph node at the level 7, 8, 10 and 11 came negative for metastatic disease. There was no direct invasion of the adjacent structure no lymphovascular invasion. Her final stage was a pT1 pN0 pMX.  9/10/20 PET/CT neuroendocrine study:  1.   Postoperative changes of recent left lower lobe pulmonary nodule wedge resection with mild metabolic activity along the lateral margin of the suture line likely being postoperative in nature. Continued attention at this site is warranted on future imaging.  2.   No evidence of metastatic disease identified.        Family history of heart disease Dr. Arredondo 02/07/2020     10/03/2013 nuclear medicine stress test no significant EKG changes.  No chest pain. Normal perfusion scan.  Normal LV systolic function  10/03/2013 TTE normal LV size wall thickness and systolic function.  05/19/17 Nuclear stress test: Five minutes standard Dario protocol. 1 mm horizontal ST depression. Substernal chest heaviness and tightness, resolved in recovery. Perfusion scan normal.  05/17 Echocardiogram shows normal cardiac function. EF normal. Greater than 50%. Grade I diastolic function.  02/02/18 Coronary calcium score was 46 with 41 in the LAD, circumflex was 5. Her liver measured 41 Hounsfield units. The spleen could not be visualized. These findings are consistent with fatty liver. The textural appearance to me of the liver was that of fatty liver. She had normal origin of the coronary arteries, normal configuration of the pulmonary veins, left atrium 35 mm, calcified plaque was also seen in the aortic annulus. Per Radiology, she had a 1 cm nodule in the left lower lobe. Radiology recommended followup in six months with CT.  6/22/20 nu med stress test  Nuclear scans  show a small area of possible anterior wall ischemia. This could also be secondary to breast artefact. Clinical      correlation suggested.     Gated scan is wnl. EF 80%     Ekg portion of test is negative         Other specified glaucoma 02/07/2020    Chronic midline low back pain without sciatica hx of laminectomy L5S1; flexeril PRN 02/07/2020    Left lumbar radiculopathy from L sciatica, remote laminectomy but residual weakness/numbness L leg 02/07/2020    Arthritis of knee, right 05/03/2019    Complex tear of medial meniscus of right knee as current injury 05/03/2019    Acute lateral meniscus tear of right knee 05/03/2019    Mixed incontinence urge and stress (male)(female) 03/05/2013    Cystocele 03/05/2013    Rectocele 03/05/2013    Chronic constipation 03/05/2013    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       PAST MEDICAL HISTORY:  Past Medical History:   Diagnosis Date    Achilles tendon tear     Diabetes mellitus     Dysplasia of cervix     GERD (gastroesophageal reflux disease)     Glaucoma (increased eye pressure)     Hypertension     Lung nodule     Sinusitis     Sleep apnea     Thyroid disease     Urinary incontinence     occ urge       PAST SURGICAL HISTORY:  Past Surgical History:   Procedure Laterality Date    ACHILLES TENDON SURGERY      ARTHROSCOPIC CHONDROPLASTY OF KNEE JOINT Right 5/3/2019    Procedure: ARTHROSCOPY, KNEE, WITH CHONDROPLASTY;  Surgeon: Doug Alexander MD;  Location: [facility] OR;  Service: Orthopedics;  Laterality: Right;    ARTHROSCOPY OF KNEE Right 5/3/2019    Procedure: ARTHROSCOPY, KNEE;  Surgeon: Doug Alexander MD;  Location: [facility] OR;  Service: Orthopedics;  Laterality: Right;    CERVIX LESION DESTRUCTION      cryo x 2    CHOLECYSTECTOMY      EXCISION OF MEDIAL MENISCUS OF KNEE Right 5/3/2019    Procedure: MENISCECTOMY, KNEE, MEDIAL;  Surgeon: Doug Alexander MD;  Location: [facility] OR;  Service: Orthopedics;  Laterality: Right;    HYSTERECTOMY       BLAKE/ovaries remain    INJECTION OF JOINT Right 5/3/2019    Procedure: INJECTION, JOINT - SYNVISE INJECTION;  Surgeon: Doug Alexander MD;  Location: [facility];  Service: Orthopedics;  Laterality: Right;  SYNVISE INJECTION FROM PHARMACY    LAMINECTOMY      L5 S1    OOPHORECTOMY      TUBAL LIGATION         SOCIAL HISTORY:  Social History     Socioeconomic History    Marital status:      Spouse name: Not on file    Number of children: Not on file    Years of education: Not on file    Highest education level: Not on file   Occupational History    Occupation: former banking; 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   Social Needs    Financial resource strain: Not very hard    Food insecurity     Worry: Never true     Inability: Never true    Transportation needs     Medical: No     Non-medical: No   Tobacco Use    Smoking status: Former Smoker     Quit date: 1990     Years since quittin.4    Smokeless tobacco: Never Used   Substance and Sexual Activity    Alcohol use: Yes     Frequency: 2-4 times a month     Drinks per session: 1 or 2     Binge frequency: Never     Comment: occ    Drug use: No    Sexual activity: Yes   Lifestyle    Physical activity     Days per week: 0 days     Minutes per session: Not on file    Stress: Very much   Relationships    Social connections     Talks on phone: More than three times a week     Gets together: Twice a week     Attends Yarsanism service: Not on file     Active member of club or organization: No     Attends meetings of clubs or organizations: Not on file     Relationship status:    Other Topics Concern    Not on file   Social History Narrative    Not on file        ALLERGIES AND MEDICATIONS: updated and reviewed.  Review of patient's allergies indicates:   Allergen Reactions    Oxycodone-acetaminophen Itching and Nausea Only     NOT ALLERGIC TO ACETAMINOPHEN, HAS TAKEN IT        Medication List with Changes/Refills   Current Medications     "ACETAMINOPHEN (TYLENOL) 500 MG TABLET    Take 500 mg by mouth every 6 (six) hours as needed for Pain.    ASPIRIN (ECOTRIN) 81 MG EC TABLET    Take 81 mg by mouth.    BRIMONIDINE 0.2% (ALPHAGAN) 0.2 % DROP        BRIMONIDINE-TIMOLOL (COMBIGAN) 0.2-0.5 % DROP    Place 1 drop into both eyes.    CYCLOBENZAPRINE (FLEXERIL) 10 MG TABLET    Take 1 tablet (10 mg total) by mouth every evening.    DIAZEPAM (VALIUM) 5 MG TABLET    Take 1 tablet (5 mg total) by mouth nightly as needed for Anxiety.    DORZOLAMIDE-TIMOLOL 2-0.5% (COSOPT) 22.3-6.8 MG/ML OPHTHALMIC SOLUTION        LEVOTHYROXINE (SYNTHROID) 100 MCG TABLET    Take 1 tablet (100 mcg total) by mouth before breakfast.    MELOXICAM (MOBIC) 15 MG TABLET        METFORMIN (GLUCOPHAGE) 500 MG TABLET    Take 500 mg by mouth 2 (two) times daily with meals.    METFORMIN (GLUCOPHAGE-XR) 500 MG XR 24HR TABLET    Take 1 tablet (500 mg total) by mouth 2 (two) times daily with meals.    METOPROLOL SUCCINATE (TOPROL-XL) 25 MG 24 HR TABLET    Take 1 tablet (25 mg total) by mouth once daily.    MONTELUKAST (SINGULAIR) 10 MG TABLET    Take 10 mg by mouth every evening.    ONDANSETRON (ZOFRAN-ODT) 4 MG TBDL    Take 2 tablets (8 mg total) by mouth every 8 (eight) hours as needed.    QUINAPRIL (ACCUPRIL) 10 MG TABLET    Take 1 tablet (10 mg total) by mouth once daily.    RHOPRESSA 0.02 % OPHTHALMIC SOLUTION        ROSUVASTATIN (CRESTOR) 20 MG TABLET    TK 1 T PO QD   Discontinued Medications    TIMOLOL MALEATE 0.5% (TIMOPTIC) 0.5 % DROP           Review of Systems   All other systems reviewed and are negative.      Objective:   Physical Exam   Vitals:    09/21/20 0915   BP: 116/78   BP Location: Left arm   Patient Position: Sitting   BP Method: Large (Manual)   Pulse: 79   Temp: 97.7 °F (36.5 °C)   TempSrc: Temporal   SpO2: 97%   Weight: 95.7 kg (211 lb)   Height: 5' 5" (1.651 m)    Body mass index is 35.11 kg/m².  Weight: 95.7 kg (211 lb)   Height: 5' 5" (165.1 cm)     Physical " Exam  Constitutional:       General: She is not in acute distress.     Appearance: She is well-developed.   Cardiovascular:      Rate and Rhythm: Normal rate and regular rhythm.      Heart sounds: Normal heart sounds. No murmur.   Pulmonary:      Effort: Pulmonary effort is normal.      Breath sounds: Normal breath sounds.   Musculoskeletal: Normal range of motion.      Right lower leg: No edema.      Left lower leg: No edema.   Skin:     General: Skin is warm and dry.   Neurological:      Mental Status: She is alert and oriented to person, place, and time.      Coordination: Coordination normal.   Psychiatric:         Behavior: Behavior normal.         Thought Content: Thought content normal.         Component      Latest Ref Rng & Units 9/17/2020   Hemoglobin A1C External      4.0 - 5.6 % 6.7 (H)   Estimated Avg Glucose      68 - 131 mg/dL 146 (H)   TSH      0.400 - 4.000 uIU/mL 0.898

## 2020-09-21 ENCOUNTER — PATIENT OUTREACH (OUTPATIENT)
Dept: ADMINISTRATIVE | Facility: HOSPITAL | Age: 70
End: 2020-09-21

## 2020-09-21 ENCOUNTER — OFFICE VISIT (OUTPATIENT)
Dept: FAMILY MEDICINE | Facility: CLINIC | Age: 70
End: 2020-09-21
Payer: MEDICARE

## 2020-09-21 VITALS
SYSTOLIC BLOOD PRESSURE: 116 MMHG | TEMPERATURE: 98 F | OXYGEN SATURATION: 97 % | HEIGHT: 65 IN | BODY MASS INDEX: 35.16 KG/M2 | HEART RATE: 79 BPM | DIASTOLIC BLOOD PRESSURE: 78 MMHG | WEIGHT: 211 LBS

## 2020-09-21 DIAGNOSIS — D3A.090 CARCINOID TUMOR OF LUNG, UNSPECIFIED WHETHER MALIGNANT: ICD-10-CM

## 2020-09-21 DIAGNOSIS — I10 ESSENTIAL HYPERTENSION: ICD-10-CM

## 2020-09-21 DIAGNOSIS — E03.8 HYPOTHYROIDISM DUE TO HASHIMOTO'S THYROIDITIS: ICD-10-CM

## 2020-09-21 DIAGNOSIS — E06.3 HYPOTHYROIDISM DUE TO HASHIMOTO'S THYROIDITIS: ICD-10-CM

## 2020-09-21 DIAGNOSIS — E11.9 TYPE 2 DIABETES MELLITUS WITHOUT COMPLICATION, WITHOUT LONG-TERM CURRENT USE OF INSULIN: Primary | ICD-10-CM

## 2020-09-21 DIAGNOSIS — Z23 NEEDS FLU SHOT: ICD-10-CM

## 2020-09-21 DIAGNOSIS — E78.5 DYSLIPIDEMIA: ICD-10-CM

## 2020-09-21 PROCEDURE — 99214 OFFICE O/P EST MOD 30 MIN: CPT | Mod: S$PBB,,, | Performed by: INTERNAL MEDICINE

## 2020-09-21 PROCEDURE — 90694 VACC AIIV4 NO PRSRV 0.5ML IM: CPT | Mod: PBBFAC,PO

## 2020-09-21 PROCEDURE — 99999 PR PBB SHADOW E&M-EST. PATIENT-LVL IV: ICD-10-PCS | Mod: PBBFAC,,, | Performed by: INTERNAL MEDICINE

## 2020-09-21 PROCEDURE — 99999 PR PBB SHADOW E&M-EST. PATIENT-LVL IV: CPT | Mod: PBBFAC,,, | Performed by: INTERNAL MEDICINE

## 2020-09-21 PROCEDURE — G0008 ADMIN INFLUENZA VIRUS VAC: HCPCS | Mod: PBBFAC,PO

## 2020-09-21 PROCEDURE — 99214 OFFICE O/P EST MOD 30 MIN: CPT | Mod: PBBFAC,PO,25 | Performed by: INTERNAL MEDICINE

## 2020-09-21 PROCEDURE — 99214 PR OFFICE/OUTPT VISIT, EST, LEVL IV, 30-39 MIN: ICD-10-PCS | Mod: S$PBB,,, | Performed by: INTERNAL MEDICINE

## 2020-09-21 RX ORDER — METOPROLOL SUCCINATE 25 MG/1
25 TABLET, EXTENDED RELEASE ORAL DAILY
Qty: 90 TABLET | Refills: 3 | Status: SHIPPED | OUTPATIENT
Start: 2020-09-21 | End: 2021-02-08 | Stop reason: SDUPTHER

## 2020-09-21 RX ORDER — METFORMIN HYDROCHLORIDE 500 MG/1
500 TABLET ORAL 2 TIMES DAILY WITH MEALS
COMMUNITY
End: 2020-09-21 | Stop reason: ALTCHOICE

## 2020-09-21 RX ORDER — METFORMIN HYDROCHLORIDE 500 MG/1
500 TABLET, EXTENDED RELEASE ORAL 2 TIMES DAILY WITH MEALS
Qty: 180 TABLET | Refills: 3 | Status: SHIPPED | OUTPATIENT
Start: 2020-09-21 | End: 2020-11-16 | Stop reason: SDUPTHER

## 2020-09-21 RX ORDER — QUINAPRIL 10 MG/1
10 TABLET ORAL DAILY
Qty: 90 TABLET | Refills: 3 | Status: SHIPPED | OUTPATIENT
Start: 2020-09-21 | End: 2021-02-11 | Stop reason: SDUPTHER

## 2020-09-21 RX ORDER — SIMVASTATIN 40 MG/1
40 TABLET, FILM COATED ORAL NIGHTLY
Qty: 90 TABLET | Refills: 3 | Status: SHIPPED | OUTPATIENT
Start: 2020-09-21 | End: 2021-02-11 | Stop reason: SDUPTHER

## 2020-09-21 RX ORDER — LEVOTHYROXINE SODIUM 100 UG/1
100 TABLET ORAL
Qty: 90 TABLET | Refills: 3 | Status: SHIPPED | OUTPATIENT
Start: 2020-09-21 | End: 2021-02-11 | Stop reason: SDUPTHER

## 2020-10-01 ENCOUNTER — PATIENT MESSAGE (OUTPATIENT)
Dept: OTHER | Facility: OTHER | Age: 70
End: 2020-10-01

## 2020-10-05 ENCOUNTER — PATIENT MESSAGE (OUTPATIENT)
Dept: ADMINISTRATIVE | Facility: HOSPITAL | Age: 70
End: 2020-10-05

## 2020-11-16 DIAGNOSIS — E11.9 TYPE 2 DIABETES MELLITUS WITHOUT COMPLICATION, WITHOUT LONG-TERM CURRENT USE OF INSULIN: ICD-10-CM

## 2020-11-16 RX ORDER — METFORMIN HYDROCHLORIDE 500 MG/1
500 TABLET, EXTENDED RELEASE ORAL 2 TIMES DAILY WITH MEALS
Qty: 180 TABLET | Refills: 1 | Status: SHIPPED | OUTPATIENT
Start: 2020-11-16 | End: 2021-02-11 | Stop reason: SDUPTHER

## 2020-11-16 NOTE — TELEPHONE ENCOUNTER
----- Message from Andra Sin sent at 11/16/2020  8:15 AM CST -----  Regarding: REFILL  Contact: Patient  Type: RX Refill Request    Who Called: Patient     Have you contacted your pharmacy:    Refill or New Rx: Refill     RX Name and Strength: metFORMIN (GLUCOPHAGE-XR) 500 MG ER 24hr tablet    How is the patient currently taking it? (ex. 1XDay):    Is this a 30 day or 90 day RX:    Preferred Pharmacy with phone number:   [facility]/pharmacy #[id number] - [place] - [address] [facility]  [address] [place] [address]  Phone: [phone] Fax: [phone]        Local or Mail Order: Local     Ordering Provider: Dr. Phillips     Would the patient rather a call back or a response via [facility]? Call back     Best Call Back Number: [phone]  [phone]

## 2021-01-04 ENCOUNTER — PATIENT MESSAGE (OUTPATIENT)
Dept: ADMINISTRATIVE | Facility: HOSPITAL | Age: 71
End: 2021-01-04

## 2021-01-05 ENCOUNTER — PATIENT MESSAGE (OUTPATIENT)
Dept: FAMILY MEDICINE | Facility: CLINIC | Age: 71
End: 2021-01-05

## 2021-01-08 ENCOUNTER — TELEPHONE (OUTPATIENT)
Dept: FAMILY MEDICINE | Facility: CLINIC | Age: 71
End: 2021-01-08

## 2021-01-08 DIAGNOSIS — R05.9 COUGH: Primary | ICD-10-CM

## 2021-01-08 RX ORDER — PROMETHAZINE HYDROCHLORIDE AND DEXTROMETHORPHAN HYDROBROMIDE 6.25; 15 MG/5ML; MG/5ML
5 SYRUP ORAL EVERY 12 HOURS PRN
Qty: 120 ML | Refills: 0 | Status: SHIPPED | OUTPATIENT
Start: 2021-01-08 | End: 2021-01-18

## 2021-01-10 ENCOUNTER — PATIENT MESSAGE (OUTPATIENT)
Dept: FAMILY MEDICINE | Facility: CLINIC | Age: 71
End: 2021-01-10

## 2021-01-10 DIAGNOSIS — U07.1 COVID-19 VIRUS INFECTION: Primary | ICD-10-CM

## 2021-01-11 ENCOUNTER — NURSE TRIAGE (OUTPATIENT)
Dept: ADMINISTRATIVE | Facility: CLINIC | Age: 71
End: 2021-01-11

## 2021-01-11 ENCOUNTER — TELEPHONE (OUTPATIENT)
Dept: FAMILY MEDICINE | Facility: CLINIC | Age: 71
End: 2021-01-11

## 2021-01-13 ENCOUNTER — TELEPHONE (OUTPATIENT)
Dept: ADMINISTRATIVE | Facility: CLINIC | Age: 71
End: 2021-01-13

## 2021-01-13 ENCOUNTER — TELEPHONE (OUTPATIENT)
Dept: FAMILY MEDICINE | Facility: CLINIC | Age: 71
End: 2021-01-13

## 2021-01-13 ENCOUNTER — PATIENT MESSAGE (OUTPATIENT)
Dept: FAMILY MEDICINE | Facility: CLINIC | Age: 71
End: 2021-01-13

## 2021-01-13 ENCOUNTER — PATIENT MESSAGE (OUTPATIENT)
Dept: ADMINISTRATIVE | Facility: OTHER | Age: 71
End: 2021-01-13

## 2021-01-13 ENCOUNTER — NURSE TRIAGE (OUTPATIENT)
Dept: ADMINISTRATIVE | Facility: CLINIC | Age: 71
End: 2021-01-13

## 2021-01-13 ENCOUNTER — HOSPITAL ENCOUNTER (OUTPATIENT)
Dept: RADIOLOGY | Facility: HOSPITAL | Age: 71
Discharge: HOME | End: 2021-01-13
Attending: INTERNAL MEDICINE
Payer: MEDICARE

## 2021-01-13 DIAGNOSIS — U07.1 COVID-19 VIRUS INFECTION: ICD-10-CM

## 2021-01-13 DIAGNOSIS — U07.1 COVID-19 VIRUS INFECTION: Primary | ICD-10-CM

## 2021-01-13 DIAGNOSIS — J15.9 BACTERIAL PNEUMONIA: Primary | ICD-10-CM

## 2021-01-13 PROCEDURE — 71046 X-RAY EXAM CHEST 2 VIEWS: CPT | Mod: 26,CR,, | Performed by: RADIOLOGY

## 2021-01-13 PROCEDURE — 71046 XR CHEST PA AND LATERAL: ICD-10-PCS | Mod: 26,CR,, | Performed by: RADIOLOGY

## 2021-01-13 PROCEDURE — 71046 X-RAY EXAM CHEST 2 VIEWS: CPT | Mod: TC,FY,PO

## 2021-01-13 RX ORDER — DOXYCYCLINE 100 MG/1
100 CAPSULE ORAL EVERY 12 HOURS
Qty: 20 CAPSULE | Refills: 0 | Status: SHIPPED | OUTPATIENT
Start: 2021-01-13 | End: 2021-01-23

## 2021-01-14 ENCOUNTER — INFUSION (OUTPATIENT)
Dept: INFECTIOUS DISEASES | Facility: HOSPITAL | Age: 71
End: 2021-01-14
Attending: INTERNAL MEDICINE
Payer: MEDICARE

## 2021-01-14 ENCOUNTER — PATIENT MESSAGE (OUTPATIENT)
Dept: ADMINISTRATIVE | Facility: OTHER | Age: 71
End: 2021-01-14

## 2021-01-14 ENCOUNTER — NURSE TRIAGE (OUTPATIENT)
Dept: ADMINISTRATIVE | Facility: CLINIC | Age: 71
End: 2021-01-14

## 2021-01-14 ENCOUNTER — OFFICE VISIT (OUTPATIENT)
Dept: URGENT CARE | Facility: CLINIC | Age: 71
End: 2021-01-14
Payer: MEDICARE

## 2021-01-14 VITALS
HEIGHT: 65 IN | TEMPERATURE: 101 F | OXYGEN SATURATION: 95 % | DIASTOLIC BLOOD PRESSURE: 90 MMHG | HEART RATE: 95 BPM | SYSTOLIC BLOOD PRESSURE: 154 MMHG | WEIGHT: 216 LBS | BODY MASS INDEX: 35.99 KG/M2 | RESPIRATION RATE: 20 BRPM

## 2021-01-14 VITALS
SYSTOLIC BLOOD PRESSURE: 112 MMHG | DIASTOLIC BLOOD PRESSURE: 67 MMHG | OXYGEN SATURATION: 96 % | HEIGHT: 65 IN | HEART RATE: 77 BPM | WEIGHT: 216 LBS | TEMPERATURE: 99 F | BODY MASS INDEX: 35.99 KG/M2 | RESPIRATION RATE: 20 BRPM

## 2021-01-14 DIAGNOSIS — R50.9 FEVER, UNSPECIFIED FEVER CAUSE: ICD-10-CM

## 2021-01-14 DIAGNOSIS — U07.1 COVID-19 VIRUS INFECTION: ICD-10-CM

## 2021-01-14 DIAGNOSIS — R11.0 NAUSEA: ICD-10-CM

## 2021-01-14 DIAGNOSIS — U07.1 COVID-19: Primary | ICD-10-CM

## 2021-01-14 PROCEDURE — 99214 PR OFFICE/OUTPT VISIT, EST, LEVL IV, 30-39 MIN: ICD-10-PCS | Mod: CR,S$GLB,, | Performed by: PHYSICIAN ASSISTANT

## 2021-01-14 PROCEDURE — 63600175 PHARM REV CODE 636 W HCPCS

## 2021-01-14 PROCEDURE — 25000003 PHARM REV CODE 250

## 2021-01-14 PROCEDURE — 99214 OFFICE O/P EST MOD 30 MIN: CPT | Mod: CR,S$GLB,, | Performed by: PHYSICIAN ASSISTANT

## 2021-01-14 PROCEDURE — M0239 BAMLANIVIMAB-XXXX INFUSION: HCPCS

## 2021-01-14 RX ORDER — SODIUM CHLORIDE 0.9 % (FLUSH) 0.9 %
10 SYRINGE (ML) INJECTION
Status: DISCONTINUED | OUTPATIENT
Start: 2021-01-14 | End: 2021-08-16

## 2021-01-14 RX ORDER — DIPHENHYDRAMINE HYDROCHLORIDE 50 MG/ML
25 INJECTION INTRAMUSCULAR; INTRAVENOUS ONCE AS NEEDED
Status: DISCONTINUED | OUTPATIENT
Start: 2021-01-14 | End: 2021-08-16

## 2021-01-14 RX ORDER — EPINEPHRINE 0.1 MG/ML
0.3 INJECTION INTRAVENOUS
Status: DISCONTINUED | OUTPATIENT
Start: 2021-01-14 | End: 2021-08-16

## 2021-01-14 RX ORDER — ALBUTEROL SULFATE 90 UG/1
2 AEROSOL, METERED RESPIRATORY (INHALATION)
Status: DISCONTINUED | OUTPATIENT
Start: 2021-01-14 | End: 2021-08-16

## 2021-01-14 RX ORDER — ONDANSETRON 4 MG/1
4 TABLET, ORALLY DISINTEGRATING ORAL ONCE AS NEEDED
Status: DISCONTINUED | OUTPATIENT
Start: 2021-01-14 | End: 2021-08-16

## 2021-01-14 RX ORDER — ONDANSETRON 4 MG/1
4 TABLET, ORALLY DISINTEGRATING ORAL EVERY 6 HOURS PRN
Qty: 20 TABLET | Refills: 0 | Status: SHIPPED | OUTPATIENT
Start: 2021-01-14 | End: 2022-01-12

## 2021-01-14 RX ORDER — ACETAMINOPHEN 325 MG/1
650 TABLET ORAL ONCE AS NEEDED
Status: DISCONTINUED | OUTPATIENT
Start: 2021-01-14 | End: 2021-08-16

## 2021-01-14 RX ORDER — ALBUTEROL SULFATE 90 UG/1
2 AEROSOL, METERED RESPIRATORY (INHALATION) EVERY 6 HOURS PRN
Qty: 18 G | Refills: 1 | Status: SHIPPED | OUTPATIENT
Start: 2021-01-14 | End: 2022-09-06

## 2021-01-14 RX ADMIN — SODIUM CHLORIDE 700 MG: 0.9 INJECTION, SOLUTION INTRAVENOUS at 11:01

## 2021-01-15 ENCOUNTER — PATIENT MESSAGE (OUTPATIENT)
Dept: ADMINISTRATIVE | Facility: OTHER | Age: 71
End: 2021-01-15

## 2021-01-15 ENCOUNTER — NURSE TRIAGE (OUTPATIENT)
Dept: ADMINISTRATIVE | Facility: CLINIC | Age: 71
End: 2021-01-15

## 2021-01-16 ENCOUNTER — PATIENT MESSAGE (OUTPATIENT)
Dept: ADMINISTRATIVE | Facility: OTHER | Age: 71
End: 2021-01-16

## 2021-01-17 ENCOUNTER — PATIENT MESSAGE (OUTPATIENT)
Dept: ADMINISTRATIVE | Facility: OTHER | Age: 71
End: 2021-01-17

## 2021-01-18 ENCOUNTER — PATIENT MESSAGE (OUTPATIENT)
Dept: ADMINISTRATIVE | Facility: OTHER | Age: 71
End: 2021-01-18

## 2021-01-19 ENCOUNTER — PATIENT MESSAGE (OUTPATIENT)
Dept: ADMINISTRATIVE | Facility: OTHER | Age: 71
End: 2021-01-19

## 2021-01-20 ENCOUNTER — PATIENT MESSAGE (OUTPATIENT)
Dept: ADMINISTRATIVE | Facility: OTHER | Age: 71
End: 2021-01-20

## 2021-01-21 ENCOUNTER — PATIENT MESSAGE (OUTPATIENT)
Dept: ADMINISTRATIVE | Facility: OTHER | Age: 71
End: 2021-01-21

## 2021-01-22 ENCOUNTER — PATIENT MESSAGE (OUTPATIENT)
Dept: ADMINISTRATIVE | Facility: OTHER | Age: 71
End: 2021-01-22

## 2021-01-23 ENCOUNTER — PATIENT MESSAGE (OUTPATIENT)
Dept: ADMINISTRATIVE | Facility: OTHER | Age: 71
End: 2021-01-23

## 2021-01-24 ENCOUNTER — PATIENT MESSAGE (OUTPATIENT)
Dept: ADMINISTRATIVE | Facility: OTHER | Age: 71
End: 2021-01-24

## 2021-01-24 ENCOUNTER — NURSE TRIAGE (OUTPATIENT)
Dept: ADMINISTRATIVE | Facility: CLINIC | Age: 71
End: 2021-01-24

## 2021-01-28 ENCOUNTER — PATIENT OUTREACH (OUTPATIENT)
Dept: ADMINISTRATIVE | Facility: HOSPITAL | Age: 71
End: 2021-01-28

## 2021-02-08 ENCOUNTER — PATIENT MESSAGE (OUTPATIENT)
Dept: FAMILY MEDICINE | Facility: CLINIC | Age: 71
End: 2021-02-08

## 2021-02-08 DIAGNOSIS — I10 ESSENTIAL HYPERTENSION: ICD-10-CM

## 2021-02-08 RX ORDER — METOPROLOL SUCCINATE 25 MG/1
25 TABLET, EXTENDED RELEASE ORAL DAILY
Qty: 90 TABLET | Refills: 3 | Status: SHIPPED | OUTPATIENT
Start: 2021-02-08 | End: 2021-08-16 | Stop reason: SDUPTHER

## 2021-02-10 ENCOUNTER — LAB VISIT (OUTPATIENT)
Dept: LAB | Facility: HOSPITAL | Age: 71
End: 2021-02-10
Attending: INTERNAL MEDICINE
Payer: MEDICARE

## 2021-02-10 DIAGNOSIS — E06.3 HYPOTHYROIDISM DUE TO HASHIMOTO'S THYROIDITIS: ICD-10-CM

## 2021-02-10 DIAGNOSIS — E03.8 HYPOTHYROIDISM DUE TO HASHIMOTO'S THYROIDITIS: ICD-10-CM

## 2021-02-10 DIAGNOSIS — E11.9 TYPE 2 DIABETES MELLITUS WITHOUT COMPLICATION, WITHOUT LONG-TERM CURRENT USE OF INSULIN: ICD-10-CM

## 2021-02-10 LAB
ALBUMIN SERPL BCP-MCNC: 4 G/DL (ref 3.5–5.2)
ALP SERPL-CCNC: 74 U/L (ref 55–135)
ALT SERPL W/O P-5'-P-CCNC: 56 U/L (ref 10–44)
ANION GAP SERPL CALC-SCNC: 9 MMOL/L (ref 8–16)
AST SERPL-CCNC: 50 U/L (ref 10–40)
BASOPHILS # BLD AUTO: 0.07 K/UL (ref 0–0.2)
BASOPHILS NFR BLD: 1.2 % (ref 0–1.9)
BILIRUB SERPL-MCNC: 0.8 MG/DL (ref 0.1–1)
BUN SERPL-MCNC: 12 MG/DL (ref 8–23)
CALCIUM SERPL-MCNC: 9.1 MG/DL (ref 8.7–10.5)
CHLORIDE SERPL-SCNC: 106 MMOL/L (ref 95–110)
CHOLEST SERPL-MCNC: 114 MG/DL (ref 120–199)
CHOLEST/HDLC SERPL: 3.2 {RATIO} (ref 2–5)
CO2 SERPL-SCNC: 24 MMOL/L (ref 23–29)
CREAT SERPL-MCNC: 0.7 MG/DL (ref 0.5–1.4)
DIFFERENTIAL METHOD: ABNORMAL
EOSINOPHIL # BLD AUTO: 0.4 K/UL (ref 0–0.5)
EOSINOPHIL NFR BLD: 7.2 % (ref 0–8)
ERYTHROCYTE [DISTWIDTH] IN BLOOD BY AUTOMATED COUNT: 14.4 % (ref 11.5–14.5)
EST. GFR  (AFRICAN AMERICAN): >60 ML/MIN/1.73 M^2
EST. GFR  (NON AFRICAN AMERICAN): >60 ML/MIN/1.73 M^2
ESTIMATED AVG GLUCOSE: 143 MG/DL (ref 68–131)
GLUCOSE SERPL-MCNC: 157 MG/DL (ref 70–110)
HBA1C MFR BLD: 6.6 % (ref 4–5.6)
HCT VFR BLD AUTO: 43.4 % (ref 37–48.5)
HDLC SERPL-MCNC: 36 MG/DL (ref 40–75)
HDLC SERPL: 31.6 % (ref 20–50)
HGB BLD-MCNC: 13.8 G/DL (ref 12–16)
IMM GRANULOCYTES # BLD AUTO: 0.01 K/UL (ref 0–0.04)
IMM GRANULOCYTES NFR BLD AUTO: 0.2 % (ref 0–0.5)
LDLC SERPL CALC-MCNC: 45.6 MG/DL (ref 63–159)
LYMPHOCYTES # BLD AUTO: 2.3 K/UL (ref 1–4.8)
LYMPHOCYTES NFR BLD: 39.8 % (ref 18–48)
MCH RBC QN AUTO: 29.5 PG (ref 27–31)
MCHC RBC AUTO-ENTMCNC: 31.8 G/DL (ref 32–36)
MCV RBC AUTO: 93 FL (ref 82–98)
MONOCYTES # BLD AUTO: 0.6 K/UL (ref 0.3–1)
MONOCYTES NFR BLD: 9.6 % (ref 4–15)
NEUTROPHILS # BLD AUTO: 2.5 K/UL (ref 1.8–7.7)
NEUTROPHILS NFR BLD: 42 % (ref 38–73)
NONHDLC SERPL-MCNC: 78 MG/DL
NRBC BLD-RTO: 0 /100 WBC
PLATELET # BLD AUTO: 186 K/UL (ref 150–350)
PMV BLD AUTO: 11.7 FL (ref 9.2–12.9)
POTASSIUM SERPL-SCNC: 4.3 MMOL/L (ref 3.5–5.1)
PROT SERPL-MCNC: 7.4 G/DL (ref 6–8.4)
RBC # BLD AUTO: 4.68 M/UL (ref 4–5.4)
SODIUM SERPL-SCNC: 139 MMOL/L (ref 136–145)
TRIGL SERPL-MCNC: 162 MG/DL (ref 30–150)
TSH SERPL DL<=0.005 MIU/L-ACNC: 2.97 UIU/ML (ref 0.4–4)
WBC # BLD AUTO: 5.83 K/UL (ref 3.9–12.7)

## 2021-02-10 PROCEDURE — 83036 HEMOGLOBIN GLYCOSYLATED A1C: CPT

## 2021-02-10 PROCEDURE — 80053 COMPREHEN METABOLIC PANEL: CPT

## 2021-02-10 PROCEDURE — 85025 COMPLETE CBC W/AUTO DIFF WBC: CPT

## 2021-02-10 PROCEDURE — 84443 ASSAY THYROID STIM HORMONE: CPT

## 2021-02-10 PROCEDURE — 36415 COLL VENOUS BLD VENIPUNCTURE: CPT | Mod: PO

## 2021-02-10 PROCEDURE — 80061 LIPID PANEL: CPT

## 2021-02-11 ENCOUNTER — OFFICE VISIT (OUTPATIENT)
Dept: FAMILY MEDICINE | Facility: CLINIC | Age: 71
End: 2021-02-11
Payer: MEDICARE

## 2021-02-11 VITALS
BODY MASS INDEX: 35.49 KG/M2 | TEMPERATURE: 98 F | SYSTOLIC BLOOD PRESSURE: 112 MMHG | HEART RATE: 71 BPM | HEIGHT: 65 IN | DIASTOLIC BLOOD PRESSURE: 78 MMHG | WEIGHT: 213 LBS | OXYGEN SATURATION: 97 %

## 2021-02-11 DIAGNOSIS — R00.2 PALPITATIONS: ICD-10-CM

## 2021-02-11 DIAGNOSIS — R07.89 CHEST WALL PAIN FOLLOWING SURGERY: ICD-10-CM

## 2021-02-11 DIAGNOSIS — J30.89 ALLERGIC RHINITIS DUE TO OTHER ALLERGIC TRIGGER, UNSPECIFIED SEASONALITY: ICD-10-CM

## 2021-02-11 DIAGNOSIS — H61.21 IMPACTED CERUMEN, RIGHT EAR: ICD-10-CM

## 2021-02-11 DIAGNOSIS — D3A.090 CARCINOID TUMOR OF LUNG, UNSPECIFIED WHETHER MALIGNANT: ICD-10-CM

## 2021-02-11 DIAGNOSIS — Z23 NEED FOR SHINGLES VACCINE: ICD-10-CM

## 2021-02-11 DIAGNOSIS — E78.5 DYSLIPIDEMIA: ICD-10-CM

## 2021-02-11 DIAGNOSIS — E03.8 HYPOTHYROIDISM DUE TO HASHIMOTO'S THYROIDITIS: ICD-10-CM

## 2021-02-11 DIAGNOSIS — Z23 NEED FOR VACCINATION FOR STREP PNEUMONIAE: ICD-10-CM

## 2021-02-11 DIAGNOSIS — G89.18 CHEST WALL PAIN FOLLOWING SURGERY: ICD-10-CM

## 2021-02-11 DIAGNOSIS — E11.9 TYPE 2 DIABETES MELLITUS WITHOUT COMPLICATION, WITHOUT LONG-TERM CURRENT USE OF INSULIN: ICD-10-CM

## 2021-02-11 DIAGNOSIS — E06.3 HYPOTHYROIDISM DUE TO HASHIMOTO'S THYROIDITIS: ICD-10-CM

## 2021-02-11 DIAGNOSIS — I10 ESSENTIAL HYPERTENSION: ICD-10-CM

## 2021-02-11 DIAGNOSIS — Z00.00 NORMAL PHYSICAL EXAM: Primary | ICD-10-CM

## 2021-02-11 PROCEDURE — 99999 PR PBB SHADOW E&M-EST. PATIENT-LVL V: ICD-10-PCS | Mod: PBBFAC,,, | Performed by: INTERNAL MEDICINE

## 2021-02-11 PROCEDURE — G0009 ADMIN PNEUMOCOCCAL VACCINE: HCPCS | Mod: PBBFAC,PO

## 2021-02-11 PROCEDURE — 90750 HZV VACC RECOMBINANT IM: CPT | Mod: PBBFAC,PO

## 2021-02-11 PROCEDURE — 99397 PER PM REEVAL EST PAT 65+ YR: CPT | Mod: S$PBB,,, | Performed by: INTERNAL MEDICINE

## 2021-02-11 PROCEDURE — 99999 PR PBB SHADOW E&M-EST. PATIENT-LVL V: CPT | Mod: PBBFAC,,, | Performed by: INTERNAL MEDICINE

## 2021-02-11 PROCEDURE — 93005 ELECTROCARDIOGRAM TRACING: CPT | Mod: PBBFAC,PO | Performed by: INTERNAL MEDICINE

## 2021-02-11 PROCEDURE — 93010 EKG 12-LEAD: ICD-10-PCS | Mod: S$PBB,,, | Performed by: INTERNAL MEDICINE

## 2021-02-11 PROCEDURE — 99397 PR PREVENTIVE VISIT,EST,65 & OVER: ICD-10-PCS | Mod: S$PBB,,, | Performed by: INTERNAL MEDICINE

## 2021-02-11 PROCEDURE — 93010 ELECTROCARDIOGRAM REPORT: CPT | Mod: S$PBB,,, | Performed by: INTERNAL MEDICINE

## 2021-02-11 PROCEDURE — 90732 PPSV23 VACC 2 YRS+ SUBQ/IM: CPT | Mod: PBBFAC,PO

## 2021-02-11 PROCEDURE — 99215 OFFICE O/P EST HI 40 MIN: CPT | Mod: PBBFAC,PO,25 | Performed by: INTERNAL MEDICINE

## 2021-02-11 RX ORDER — LEVOTHYROXINE SODIUM 100 UG/1
100 TABLET ORAL
Qty: 90 TABLET | Refills: 3 | Status: SHIPPED | OUTPATIENT
Start: 2021-02-11 | End: 2021-08-16 | Stop reason: SDUPTHER

## 2021-02-11 RX ORDER — QUINAPRIL 10 MG/1
10 TABLET ORAL DAILY
Qty: 90 TABLET | Refills: 3 | Status: SHIPPED | OUTPATIENT
Start: 2021-02-11 | End: 2021-08-16 | Stop reason: SDUPTHER

## 2021-02-11 RX ORDER — METFORMIN HYDROCHLORIDE 500 MG/1
500 TABLET, EXTENDED RELEASE ORAL 2 TIMES DAILY WITH MEALS
Qty: 180 TABLET | Refills: 3 | Status: SHIPPED | OUTPATIENT
Start: 2021-02-11 | End: 2021-08-16 | Stop reason: SDUPTHER

## 2021-02-11 RX ORDER — SIMVASTATIN 40 MG/1
40 TABLET, FILM COATED ORAL NIGHTLY
Qty: 90 TABLET | Refills: 3 | Status: SHIPPED | OUTPATIENT
Start: 2021-02-11 | End: 2021-08-16 | Stop reason: SDUPTHER

## 2021-02-11 RX ORDER — MONTELUKAST SODIUM 10 MG/1
10 TABLET ORAL NIGHTLY
Qty: 90 TABLET | Refills: 2 | Status: SHIPPED | OUTPATIENT
Start: 2021-02-11 | End: 2021-08-16 | Stop reason: SDUPTHER

## 2021-02-11 RX ORDER — GABAPENTIN 300 MG/1
300 CAPSULE ORAL 3 TIMES DAILY
Qty: 90 CAPSULE | Refills: 0 | Status: SHIPPED | OUTPATIENT
Start: 2021-02-11 | End: 2021-06-02 | Stop reason: SDUPTHER

## 2021-04-12 ENCOUNTER — TELEPHONE (OUTPATIENT)
Dept: FAMILY MEDICINE | Facility: CLINIC | Age: 71
End: 2021-04-12

## 2021-04-13 ENCOUNTER — IMMUNIZATION (OUTPATIENT)
Dept: INTERNAL MEDICINE | Facility: CLINIC | Age: 71
End: 2021-04-13
Payer: MEDICARE

## 2021-04-13 DIAGNOSIS — Z23 NEED FOR VACCINATION: Primary | ICD-10-CM

## 2021-04-13 PROCEDURE — 91300 COVID-19, MRNA, LNP-S, PF, 30 MCG/0.3 ML DOSE VACCINE: ICD-10-PCS | Mod: S$GLB,,, | Performed by: INTERNAL MEDICINE

## 2021-04-13 PROCEDURE — 91300 COVID-19, MRNA, LNP-S, PF, 30 MCG/0.3 ML DOSE VACCINE: CPT | Mod: S$GLB,,, | Performed by: INTERNAL MEDICINE

## 2021-04-13 PROCEDURE — 0001A COVID-19, MRNA, LNP-S, PF, 30 MCG/0.3 ML DOSE VACCINE: CPT | Mod: CV19,S$GLB,, | Performed by: INTERNAL MEDICINE

## 2021-04-13 PROCEDURE — 0001A COVID-19, MRNA, LNP-S, PF, 30 MCG/0.3 ML DOSE VACCINE: ICD-10-PCS | Mod: CV19,S$GLB,, | Performed by: INTERNAL MEDICINE

## 2021-05-07 ENCOUNTER — IMMUNIZATION (OUTPATIENT)
Dept: INTERNAL MEDICINE | Facility: CLINIC | Age: 71
End: 2021-05-07
Payer: MEDICARE

## 2021-05-07 DIAGNOSIS — Z23 NEED FOR VACCINATION: Primary | ICD-10-CM

## 2021-05-07 PROCEDURE — 91300 COVID-19, MRNA, LNP-S, PF, 30 MCG/0.3 ML DOSE VACCINE: CPT | Mod: PBBFAC

## 2021-05-07 PROCEDURE — 0002A COVID-19, MRNA, LNP-S, PF, 30 MCG/0.3 ML DOSE VACCINE: CPT | Mod: PBBFAC

## 2021-06-02 ENCOUNTER — TELEPHONE (OUTPATIENT)
Dept: FAMILY MEDICINE | Facility: CLINIC | Age: 71
End: 2021-06-02

## 2021-06-02 DIAGNOSIS — R07.89 CHEST WALL PAIN FOLLOWING SURGERY: ICD-10-CM

## 2021-06-02 DIAGNOSIS — G89.18 CHEST WALL PAIN FOLLOWING SURGERY: ICD-10-CM

## 2021-06-02 RX ORDER — GABAPENTIN 300 MG/1
300 CAPSULE ORAL 3 TIMES DAILY
Qty: 90 CAPSULE | Refills: 0 | Status: SHIPPED | OUTPATIENT
Start: 2021-06-02 | End: 2021-08-24 | Stop reason: SDUPTHER

## 2021-06-04 ENCOUNTER — CLINICAL SUPPORT (OUTPATIENT)
Dept: FAMILY MEDICINE | Facility: CLINIC | Age: 71
End: 2021-06-04
Payer: MEDICARE

## 2021-06-04 DIAGNOSIS — Z23 NEED FOR ZOSTER VACCINATION: Primary | ICD-10-CM

## 2021-06-04 PROCEDURE — 90471 IMMUNIZATION ADMIN: CPT | Mod: PBBFAC,PO

## 2021-06-04 PROCEDURE — 99499 NO LOS: ICD-10-PCS | Mod: S$PBB,,, | Performed by: INTERNAL MEDICINE

## 2021-06-04 PROCEDURE — 90750 HZV VACC RECOMBINANT IM: CPT | Mod: PBBFAC,PO

## 2021-06-04 PROCEDURE — 99499 UNLISTED E&M SERVICE: CPT | Mod: S$PBB,,, | Performed by: INTERNAL MEDICINE

## 2021-06-16 ENCOUNTER — PATIENT MESSAGE (OUTPATIENT)
Dept: FAMILY MEDICINE | Facility: CLINIC | Age: 71
End: 2021-06-16

## 2021-06-16 DIAGNOSIS — Z12.31 ENCOUNTER FOR SCREENING MAMMOGRAM FOR MALIGNANT NEOPLASM OF BREAST: Primary | ICD-10-CM

## 2021-06-25 ENCOUNTER — HOSPITAL ENCOUNTER (OUTPATIENT)
Dept: RADIOLOGY | Facility: HOSPITAL | Age: 71
Discharge: HOME | End: 2021-06-25
Attending: INTERNAL MEDICINE
Payer: MEDICARE

## 2021-06-25 DIAGNOSIS — Z12.31 ENCOUNTER FOR SCREENING MAMMOGRAM FOR MALIGNANT NEOPLASM OF BREAST: ICD-10-CM

## 2021-06-25 PROCEDURE — 77063 MAMMO DIGITAL SCREENING BILAT WITH TOMO: ICD-10-PCS | Mod: 26,,, | Performed by: RADIOLOGY

## 2021-06-25 PROCEDURE — 77067 SCR MAMMO BI INCL CAD: CPT | Mod: TC,PO

## 2021-06-25 PROCEDURE — 77067 SCR MAMMO BI INCL CAD: CPT | Mod: 26,,, | Performed by: RADIOLOGY

## 2021-06-25 PROCEDURE — 77067 MAMMO DIGITAL SCREENING BILAT WITH TOMO: ICD-10-PCS | Mod: 26,,, | Performed by: RADIOLOGY

## 2021-06-25 PROCEDURE — 77063 BREAST TOMOSYNTHESIS BI: CPT | Mod: 26,,, | Performed by: RADIOLOGY

## 2021-08-12 ENCOUNTER — LAB VISIT (OUTPATIENT)
Dept: LAB | Facility: HOSPITAL | Age: 71
End: 2021-08-12
Attending: INTERNAL MEDICINE
Payer: MEDICARE

## 2021-08-12 DIAGNOSIS — E06.3 HYPOTHYROIDISM DUE TO HASHIMOTO'S THYROIDITIS: ICD-10-CM

## 2021-08-12 DIAGNOSIS — E03.8 HYPOTHYROIDISM DUE TO HASHIMOTO'S THYROIDITIS: ICD-10-CM

## 2021-08-12 DIAGNOSIS — E11.9 TYPE 2 DIABETES MELLITUS WITHOUT COMPLICATION, WITHOUT LONG-TERM CURRENT USE OF INSULIN: ICD-10-CM

## 2021-08-12 LAB
ALBUMIN SERPL BCP-MCNC: 3.9 G/DL (ref 3.5–5.2)
ALP SERPL-CCNC: 88 U/L (ref 55–135)
ALT SERPL W/O P-5'-P-CCNC: 70 U/L (ref 10–44)
ANION GAP SERPL CALC-SCNC: 13 MMOL/L (ref 8–16)
AST SERPL-CCNC: 59 U/L (ref 10–40)
BASOPHILS # BLD AUTO: 0.07 K/UL (ref 0–0.2)
BASOPHILS NFR BLD: 1.2 % (ref 0–1.9)
BILIRUB SERPL-MCNC: 0.9 MG/DL (ref 0.1–1)
BUN SERPL-MCNC: 13 MG/DL (ref 8–23)
CALCIUM SERPL-MCNC: 9.5 MG/DL (ref 8.7–10.5)
CHLORIDE SERPL-SCNC: 105 MMOL/L (ref 95–110)
CHOLEST SERPL-MCNC: 107 MG/DL (ref 120–199)
CHOLEST/HDLC SERPL: 3.1 {RATIO} (ref 2–5)
CO2 SERPL-SCNC: 23 MMOL/L (ref 23–29)
CREAT SERPL-MCNC: 0.8 MG/DL (ref 0.5–1.4)
DIFFERENTIAL METHOD: ABNORMAL
EOSINOPHIL # BLD AUTO: 0.4 K/UL (ref 0–0.5)
EOSINOPHIL NFR BLD: 5.8 % (ref 0–8)
ERYTHROCYTE [DISTWIDTH] IN BLOOD BY AUTOMATED COUNT: 13.9 % (ref 11.5–14.5)
EST. GFR  (AFRICAN AMERICAN): >60 ML/MIN/1.73 M^2
EST. GFR  (NON AFRICAN AMERICAN): >60 ML/MIN/1.73 M^2
ESTIMATED AVG GLUCOSE: 148 MG/DL (ref 68–131)
GLUCOSE SERPL-MCNC: 174 MG/DL (ref 70–110)
HBA1C MFR BLD: 6.8 % (ref 4–5.6)
HCT VFR BLD AUTO: 42.2 % (ref 37–48.5)
HDLC SERPL-MCNC: 34 MG/DL (ref 40–75)
HDLC SERPL: 31.8 % (ref 20–50)
HGB BLD-MCNC: 13.7 G/DL (ref 12–16)
IMM GRANULOCYTES # BLD AUTO: 0.01 K/UL (ref 0–0.04)
IMM GRANULOCYTES NFR BLD AUTO: 0.2 % (ref 0–0.5)
LDLC SERPL CALC-MCNC: 49 MG/DL (ref 63–159)
LYMPHOCYTES # BLD AUTO: 2.8 K/UL (ref 1–4.8)
LYMPHOCYTES NFR BLD: 45.9 % (ref 18–48)
MCH RBC QN AUTO: 30 PG (ref 27–31)
MCHC RBC AUTO-ENTMCNC: 32.5 G/DL (ref 32–36)
MCV RBC AUTO: 92 FL (ref 82–98)
MONOCYTES # BLD AUTO: 0.6 K/UL (ref 0.3–1)
MONOCYTES NFR BLD: 9.4 % (ref 4–15)
NEUTROPHILS # BLD AUTO: 2.3 K/UL (ref 1.8–7.7)
NEUTROPHILS NFR BLD: 37.5 % (ref 38–73)
NONHDLC SERPL-MCNC: 73 MG/DL
NRBC BLD-RTO: 0 /100 WBC
PLATELET # BLD AUTO: 187 K/UL (ref 150–450)
PMV BLD AUTO: 11.3 FL (ref 9.2–12.9)
POTASSIUM SERPL-SCNC: 4.4 MMOL/L (ref 3.5–5.1)
PROT SERPL-MCNC: 7.4 G/DL (ref 6–8.4)
RBC # BLD AUTO: 4.57 M/UL (ref 4–5.4)
SODIUM SERPL-SCNC: 141 MMOL/L (ref 136–145)
TRIGL SERPL-MCNC: 120 MG/DL (ref 30–150)
TSH SERPL DL<=0.005 MIU/L-ACNC: 3.52 UIU/ML (ref 0.4–4)
WBC # BLD AUTO: 6.04 K/UL (ref 3.9–12.7)

## 2021-08-12 PROCEDURE — 80061 LIPID PANEL: CPT | Performed by: INTERNAL MEDICINE

## 2021-08-12 PROCEDURE — 80053 COMPREHEN METABOLIC PANEL: CPT | Performed by: INTERNAL MEDICINE

## 2021-08-12 PROCEDURE — 36415 COLL VENOUS BLD VENIPUNCTURE: CPT | Mod: PO | Performed by: INTERNAL MEDICINE

## 2021-08-12 PROCEDURE — 85025 COMPLETE CBC W/AUTO DIFF WBC: CPT | Performed by: INTERNAL MEDICINE

## 2021-08-12 PROCEDURE — 83036 HEMOGLOBIN GLYCOSYLATED A1C: CPT | Performed by: INTERNAL MEDICINE

## 2021-08-12 PROCEDURE — 84443 ASSAY THYROID STIM HORMONE: CPT | Performed by: INTERNAL MEDICINE

## 2021-08-13 PROBLEM — S83.281A ACUTE LATERAL MENISCUS TEAR OF RIGHT KNEE: Status: RESOLVED | Noted: 2019-05-03 | Resolved: 2021-08-13

## 2021-08-16 ENCOUNTER — OFFICE VISIT (OUTPATIENT)
Dept: FAMILY MEDICINE | Facility: CLINIC | Age: 71
End: 2021-08-16
Payer: MEDICARE

## 2021-08-16 ENCOUNTER — PATIENT MESSAGE (OUTPATIENT)
Dept: ADMINISTRATIVE | Facility: OTHER | Age: 71
End: 2021-08-16

## 2021-08-16 VITALS
SYSTOLIC BLOOD PRESSURE: 118 MMHG | DIASTOLIC BLOOD PRESSURE: 70 MMHG | WEIGHT: 219.81 LBS | TEMPERATURE: 98 F | HEIGHT: 65 IN | OXYGEN SATURATION: 96 % | BODY MASS INDEX: 36.62 KG/M2 | HEART RATE: 69 BPM

## 2021-08-16 DIAGNOSIS — E03.8 HYPOTHYROIDISM DUE TO HASHIMOTO'S THYROIDITIS: ICD-10-CM

## 2021-08-16 DIAGNOSIS — E78.5 DYSLIPIDEMIA: ICD-10-CM

## 2021-08-16 DIAGNOSIS — J30.89 ALLERGIC RHINITIS DUE TO OTHER ALLERGIC TRIGGER, UNSPECIFIED SEASONALITY: ICD-10-CM

## 2021-08-16 DIAGNOSIS — E06.3 HYPOTHYROIDISM DUE TO HASHIMOTO'S THYROIDITIS: ICD-10-CM

## 2021-08-16 DIAGNOSIS — F43.20 ADJUSTMENT DISORDER, UNSPECIFIED TYPE: ICD-10-CM

## 2021-08-16 DIAGNOSIS — Z23 NEED FOR TD VACCINE: ICD-10-CM

## 2021-08-16 DIAGNOSIS — E11.9 TYPE 2 DIABETES MELLITUS WITHOUT COMPLICATION, WITHOUT LONG-TERM CURRENT USE OF INSULIN: ICD-10-CM

## 2021-08-16 DIAGNOSIS — T50.905A PILL ESOPHAGITIS: ICD-10-CM

## 2021-08-16 DIAGNOSIS — R10.9 LEFT FLANK PAIN: Primary | ICD-10-CM

## 2021-08-16 DIAGNOSIS — D3A.090 CARCINOID TUMOR OF LUNG, UNSPECIFIED WHETHER MALIGNANT: ICD-10-CM

## 2021-08-16 DIAGNOSIS — I10 ESSENTIAL HYPERTENSION: ICD-10-CM

## 2021-08-16 DIAGNOSIS — K20.80 PILL ESOPHAGITIS: ICD-10-CM

## 2021-08-16 PROCEDURE — 99214 OFFICE O/P EST MOD 30 MIN: CPT | Mod: PBBFAC,25,PO | Performed by: INTERNAL MEDICINE

## 2021-08-16 PROCEDURE — 99214 OFFICE O/P EST MOD 30 MIN: CPT | Mod: S$PBB,,, | Performed by: INTERNAL MEDICINE

## 2021-08-16 PROCEDURE — 90471 IMMUNIZATION ADMIN: CPT | Mod: PBBFAC,PO

## 2021-08-16 PROCEDURE — 90714 TD VACC NO PRESV 7 YRS+ IM: CPT | Mod: PBBFAC,PO

## 2021-08-16 PROCEDURE — 99999 PR PBB SHADOW E&M-EST. PATIENT-LVL IV: ICD-10-PCS | Mod: PBBFAC,,, | Performed by: INTERNAL MEDICINE

## 2021-08-16 PROCEDURE — 99214 PR OFFICE/OUTPT VISIT, EST, LEVL IV, 30-39 MIN: ICD-10-PCS | Mod: S$PBB,,, | Performed by: INTERNAL MEDICINE

## 2021-08-16 PROCEDURE — 99999 PR PBB SHADOW E&M-EST. PATIENT-LVL IV: CPT | Mod: PBBFAC,,, | Performed by: INTERNAL MEDICINE

## 2021-08-16 RX ORDER — MONTELUKAST SODIUM 10 MG/1
10 TABLET ORAL NIGHTLY
Qty: 90 TABLET | Refills: 3 | Status: SHIPPED | OUTPATIENT
Start: 2021-08-16 | End: 2022-05-18 | Stop reason: ALTCHOICE

## 2021-08-16 RX ORDER — METFORMIN HYDROCHLORIDE 500 MG/1
500 TABLET, EXTENDED RELEASE ORAL 2 TIMES DAILY WITH MEALS
Qty: 180 TABLET | Refills: 3 | Status: SHIPPED | OUTPATIENT
Start: 2021-08-16 | End: 2022-02-17

## 2021-08-16 RX ORDER — SIMVASTATIN 40 MG/1
40 TABLET, FILM COATED ORAL NIGHTLY
Qty: 90 TABLET | Refills: 3 | Status: SHIPPED | OUTPATIENT
Start: 2021-08-16 | End: 2021-12-27 | Stop reason: ALTCHOICE

## 2021-08-16 RX ORDER — DOXYCYCLINE HYCLATE 100 MG
TABLET ORAL
COMMUNITY
Start: 2021-08-09 | End: 2021-08-19

## 2021-08-16 RX ORDER — LEVOTHYROXINE SODIUM 100 UG/1
100 TABLET ORAL
Qty: 90 TABLET | Refills: 3 | Status: SHIPPED | OUTPATIENT
Start: 2021-08-16 | End: 2022-06-15 | Stop reason: SDUPTHER

## 2021-08-16 RX ORDER — GABAPENTIN 100 MG/1
300 CAPSULE ORAL
COMMUNITY
End: 2021-08-16 | Stop reason: SDUPTHER

## 2021-08-16 RX ORDER — METOPROLOL SUCCINATE 25 MG/1
25 TABLET, EXTENDED RELEASE ORAL DAILY
Qty: 90 TABLET | Refills: 3 | Status: SHIPPED | OUTPATIENT
Start: 2021-08-16 | End: 2022-01-12

## 2021-08-16 RX ORDER — DIAZEPAM 5 MG/1
5 TABLET ORAL NIGHTLY PRN
Qty: 14 TABLET | Refills: 0 | Status: SHIPPED | OUTPATIENT
Start: 2021-08-16 | End: 2023-03-03 | Stop reason: SDUPTHER

## 2021-08-16 RX ORDER — QUINAPRIL 10 MG/1
10 TABLET ORAL DAILY
Qty: 90 TABLET | Refills: 3 | Status: SHIPPED | OUTPATIENT
Start: 2021-08-16 | End: 2021-12-27 | Stop reason: ALTCHOICE

## 2021-08-17 ENCOUNTER — PATIENT MESSAGE (OUTPATIENT)
Dept: ADMINISTRATIVE | Facility: OTHER | Age: 71
End: 2021-08-17

## 2021-08-24 ENCOUNTER — PATIENT MESSAGE (OUTPATIENT)
Dept: FAMILY MEDICINE | Facility: CLINIC | Age: 71
End: 2021-08-24

## 2021-08-24 DIAGNOSIS — G89.18 CHEST WALL PAIN FOLLOWING SURGERY: ICD-10-CM

## 2021-08-24 DIAGNOSIS — R07.89 CHEST WALL PAIN FOLLOWING SURGERY: ICD-10-CM

## 2021-08-24 RX ORDER — GABAPENTIN 300 MG/1
300 CAPSULE ORAL 3 TIMES DAILY
Qty: 90 CAPSULE | Refills: 11 | Status: SHIPPED | OUTPATIENT
Start: 2021-08-24 | End: 2022-09-06 | Stop reason: SDUPTHER

## 2021-10-12 ENCOUNTER — PATIENT MESSAGE (OUTPATIENT)
Dept: FAMILY MEDICINE | Facility: CLINIC | Age: 71
End: 2021-10-12

## 2021-10-12 DIAGNOSIS — J06.9 VIRAL URI: Primary | ICD-10-CM

## 2021-10-12 RX ORDER — PROMETHAZINE HYDROCHLORIDE AND DEXTROMETHORPHAN HYDROBROMIDE 6.25; 15 MG/5ML; MG/5ML
5 SYRUP ORAL EVERY 12 HOURS PRN
Qty: 180 ML | Refills: 0 | Status: SHIPPED | OUTPATIENT
Start: 2021-10-12 | End: 2021-10-22

## 2021-10-20 ENCOUNTER — CLINICAL SUPPORT (OUTPATIENT)
Dept: FAMILY MEDICINE | Facility: CLINIC | Age: 71
End: 2021-10-20
Payer: MEDICARE

## 2021-10-20 DIAGNOSIS — Z23 NEEDS FLU SHOT: Primary | ICD-10-CM

## 2021-10-20 PROCEDURE — 90694 VACC AIIV4 NO PRSRV 0.5ML IM: CPT | Mod: PBBFAC,PO | Performed by: INTERNAL MEDICINE

## 2021-10-20 PROCEDURE — G0008 ADMIN INFLUENZA VIRUS VAC: HCPCS | Mod: PBBFAC,PO | Performed by: INTERNAL MEDICINE

## 2021-10-20 PROCEDURE — 99499 NO LOS: ICD-10-PCS | Mod: S$PBB,,, | Performed by: INTERNAL MEDICINE

## 2021-10-20 PROCEDURE — 99499 UNLISTED E&M SERVICE: CPT | Mod: S$PBB,,, | Performed by: INTERNAL MEDICINE

## 2021-11-03 ENCOUNTER — PATIENT MESSAGE (OUTPATIENT)
Dept: OTHER | Facility: OTHER | Age: 71
End: 2021-11-03
Payer: MEDICARE

## 2021-11-30 ENCOUNTER — TELEPHONE (OUTPATIENT)
Dept: FAMILY MEDICINE | Facility: CLINIC | Age: 71
End: 2021-11-30
Payer: MEDICARE

## 2021-12-02 ENCOUNTER — IMMUNIZATION (OUTPATIENT)
Dept: INTERNAL MEDICINE | Facility: CLINIC | Age: 71
End: 2021-12-02
Payer: MEDICARE

## 2021-12-02 ENCOUNTER — PATIENT MESSAGE (OUTPATIENT)
Dept: OTHER | Facility: OTHER | Age: 71
End: 2021-12-02
Payer: MEDICARE

## 2021-12-02 DIAGNOSIS — Z23 NEED FOR VACCINATION: Primary | ICD-10-CM

## 2021-12-02 PROCEDURE — 0064A COVID-19, MRNA, LNP-S, PF, 100 MCG/0.25 ML DOSE VACCINE (MODERNA BOOSTER): CPT | Mod: PBBFAC

## 2021-12-22 ENCOUNTER — PATIENT MESSAGE (OUTPATIENT)
Dept: FAMILY MEDICINE | Facility: CLINIC | Age: 71
End: 2021-12-22
Payer: MEDICARE

## 2021-12-26 ENCOUNTER — PATIENT MESSAGE (OUTPATIENT)
Dept: FAMILY MEDICINE | Facility: CLINIC | Age: 71
End: 2021-12-26
Payer: MEDICARE

## 2021-12-26 DIAGNOSIS — E55.9 VITAMIN D DEFICIENCY: ICD-10-CM

## 2021-12-26 DIAGNOSIS — I51.89 DIASTOLIC DYSFUNCTION: Primary | ICD-10-CM

## 2021-12-26 DIAGNOSIS — E78.5 DYSLIPIDEMIA: ICD-10-CM

## 2021-12-27 ENCOUNTER — PATIENT MESSAGE (OUTPATIENT)
Dept: FAMILY MEDICINE | Facility: CLINIC | Age: 71
End: 2021-12-27
Payer: MEDICARE

## 2021-12-27 ENCOUNTER — TELEPHONE (OUTPATIENT)
Dept: FAMILY MEDICINE | Facility: CLINIC | Age: 71
End: 2021-12-27
Payer: MEDICARE

## 2021-12-27 DIAGNOSIS — I51.89 DIASTOLIC DYSFUNCTION: Primary | ICD-10-CM

## 2021-12-27 DIAGNOSIS — R06.02 SHORTNESS OF BREATH: ICD-10-CM

## 2021-12-27 RX ORDER — ERGOCALCIFEROL 1.25 MG/1
50000 CAPSULE ORAL WEEKLY
COMMUNITY
Start: 2021-11-05 | End: 2022-05-18 | Stop reason: ALTCHOICE

## 2021-12-27 RX ORDER — ROSUVASTATIN CALCIUM 10 MG/1
10 TABLET, COATED ORAL NIGHTLY
COMMUNITY
Start: 2021-11-20 | End: 2022-09-06 | Stop reason: SDUPTHER

## 2021-12-27 RX ORDER — NITROGLYCERIN 0.4 MG/1
TABLET SUBLINGUAL
COMMUNITY
Start: 2021-11-05 | End: 2022-01-12

## 2021-12-27 RX ORDER — ACETAMINOPHEN 160 MG/5ML
200 SUSPENSION, ORAL (FINAL DOSE FORM) ORAL
COMMUNITY
Start: 2021-10-05 | End: 2023-03-20

## 2021-12-28 ENCOUNTER — TELEPHONE (OUTPATIENT)
Dept: FAMILY MEDICINE | Facility: CLINIC | Age: 71
End: 2021-12-28
Payer: MEDICARE

## 2022-01-12 ENCOUNTER — OFFICE VISIT (OUTPATIENT)
Dept: CARDIOLOGY | Facility: CLINIC | Age: 72
End: 2022-01-12
Payer: MEDICARE

## 2022-01-12 VITALS
SYSTOLIC BLOOD PRESSURE: 132 MMHG | DIASTOLIC BLOOD PRESSURE: 64 MMHG | OXYGEN SATURATION: 98 % | HEART RATE: 64 BPM | RESPIRATION RATE: 16 BRPM

## 2022-01-12 DIAGNOSIS — E11.9 TYPE 2 DIABETES MELLITUS WITHOUT COMPLICATION, WITHOUT LONG-TERM CURRENT USE OF INSULIN: ICD-10-CM

## 2022-01-12 DIAGNOSIS — R06.02 SHORTNESS OF BREATH: ICD-10-CM

## 2022-01-12 DIAGNOSIS — I50.32 CHRONIC HEART FAILURE WITH PRESERVED EJECTION FRACTION: ICD-10-CM

## 2022-01-12 DIAGNOSIS — E78.5 DYSLIPIDEMIA: ICD-10-CM

## 2022-01-12 DIAGNOSIS — Z82.49 FAMILY HISTORY OF HEART DISEASE: ICD-10-CM

## 2022-01-12 DIAGNOSIS — R06.09 DOE (DYSPNEA ON EXERTION): Primary | ICD-10-CM

## 2022-01-12 DIAGNOSIS — R07.89 CHEST WALL PAIN FOLLOWING SURGERY: ICD-10-CM

## 2022-01-12 DIAGNOSIS — I51.89 DIASTOLIC DYSFUNCTION: ICD-10-CM

## 2022-01-12 DIAGNOSIS — G89.18 CHEST WALL PAIN FOLLOWING SURGERY: ICD-10-CM

## 2022-01-12 PROCEDURE — 93010 ELECTROCARDIOGRAM REPORT: CPT | Mod: S$PBB,,, | Performed by: INTERNAL MEDICINE

## 2022-01-12 PROCEDURE — 99214 OFFICE O/P EST MOD 30 MIN: CPT | Mod: PBBFAC | Performed by: INTERNAL MEDICINE

## 2022-01-12 PROCEDURE — 93010 EKG 12-LEAD: ICD-10-PCS | Mod: S$PBB,,, | Performed by: INTERNAL MEDICINE

## 2022-01-12 PROCEDURE — 93005 ELECTROCARDIOGRAM TRACING: CPT | Mod: PBBFAC | Performed by: INTERNAL MEDICINE

## 2022-01-12 PROCEDURE — 99204 OFFICE O/P NEW MOD 45 MIN: CPT | Mod: S$PBB,,, | Performed by: INTERNAL MEDICINE

## 2022-01-12 PROCEDURE — 99999 PR PBB SHADOW E&M-EST. PATIENT-LVL IV: ICD-10-PCS | Mod: PBBFAC,,, | Performed by: INTERNAL MEDICINE

## 2022-01-12 PROCEDURE — 99999 PR PBB SHADOW E&M-EST. PATIENT-LVL IV: CPT | Mod: PBBFAC,,, | Performed by: INTERNAL MEDICINE

## 2022-01-12 PROCEDURE — 99204 PR OFFICE/OUTPT VISIT, NEW, LEVL IV, 45-59 MIN: ICD-10-PCS | Mod: S$PBB,,, | Performed by: INTERNAL MEDICINE

## 2022-01-12 RX ORDER — QUINAPRIL 10 MG/1
10 TABLET ORAL DAILY
Qty: 90 TABLET | Refills: 3
Start: 2022-01-12 | End: 2022-02-09 | Stop reason: SDUPTHER

## 2022-01-12 RX ORDER — FUROSEMIDE 20 MG/1
20 TABLET ORAL DAILY
Qty: 90 TABLET | Refills: 3 | Status: SHIPPED | OUTPATIENT
Start: 2022-01-12 | End: 2023-02-06 | Stop reason: SDUPTHER

## 2022-01-12 NOTE — PROGRESS NOTES
CARDIOVASCULAR CONSULTATION    REASON FOR CONSULT:   Alvina Fisher is a 71 y.o. female who presents for MYLES.    Req/PCP: Dair  HISTORY OF PRESENT ILLNESS:   Prev seen by Dr. Arredondo, now asking for 2nd opinion.    The patient is a 71-year-old woman presents the request of her primary care physician for evaluation of dyspnea on exertion.  [facility] followed [facility], and underwent cardiac testing notable for an abnormal nuclear stress test.  Subsequent heart catheterization revealed normal coronary arteries with an EDP of 19. Dr. Arredondo started the patient on Entresto, but the patient never took it and states she is unable to afford it.  She also describes continued dyspnea on exertion and fatigue.  She has had no bethanie angina, but does have occasional chest pain.  There has been no PND, orthopnea, melena, hematuria, or claudicant symptoms.  She does report some lower extremity edema.  She is on CPAP for sleep apnea.  She also has a history of carcinoid.    Family history is notable for mother with coronary artery disease status post percutaneous intervention in her 60s.  She  in her 90s.    The patient is a former smoker.  She denies alcohol excess or illicit drug use.    CARDIOVASCULAR HISTORY:   Cath 11/15/21 normal cors, EDP 19.    SAM on CPAP    PAST MEDICAL HISTORY:     Past Medical History:   Diagnosis Date    Achilles tendon tear     Diabetes mellitus     Dysplasia of cervix     GERD (gastroesophageal reflux disease)     Glaucoma (increased eye pressure)     Hypertension     Lung nodule     Sinusitis     Sleep apnea     Thyroid disease     Urinary incontinence     occ urge       PAST SURGICAL HISTORY:     Past Surgical History:   Procedure Laterality Date    ACHILLES TENDON SURGERY      ARTHROSCOPIC CHONDROPLASTY OF KNEE JOINT Right 5/3/2019    Procedure: ARTHROSCOPY, KNEE, WITH CHONDROPLASTY;  Surgeon: Doug Alexander MD;  Location: [facility];  Service: Orthopedics;   Laterality: Right;    ARTHROSCOPY OF KNEE Right 5/3/2019    Procedure: ARTHROSCOPY, KNEE;  Surgeon: Doug Alexander MD;  Location: [facility] OR;  Service: Orthopedics;  Laterality: Right;    CERVIX LESION DESTRUCTION      cryo x 2    CHOLECYSTECTOMY      EXCISION OF MEDIAL MENISCUS OF KNEE Right 5/3/2019    Procedure: MENISCECTOMY, KNEE, MEDIAL;  Surgeon: Doug Alexander MD;  Location: [facility] OR;  Service: Orthopedics;  Laterality: Right;    HYSTERECTOMY      BLAKE/ovaries remain    INJECTION OF JOINT Right 5/3/2019    Procedure: INJECTION, JOINT - SYNVISE INJECTION;  Surgeon: Doug Alexander MD;  Location: [facility] OR;  Service: Orthopedics;  Laterality: Right;  SYNVISE INJECTION FROM PHARMACY    LAMINECTOMY  1990    L5 S1    OOPHORECTOMY      TUBAL LIGATION         ALLERGIES AND MEDICATION:     Review of patient's allergies indicates:   Allergen Reactions    Oxycodone-acetaminophen Itching and Nausea Only     NOT ALLERGIC TO ACETAMINOPHEN, HAS TAKEN IT         Medication List          Accurate as of January 12, 2022  9:34 AM. If you have any questions, ask your nurse or doctor.            CONTINUE taking these medications    acetaminophen 500 MG tablet  Commonly known as: TYLENOL     albuterol 90 mcg/actuation inhaler  Commonly known as: PROAIR HFA  Inhale 2 puffs into the lungs every 6 (six) hours as needed for Wheezing or Shortness of Breath.     aspirin 81 MG EC tablet  Commonly known as: ECOTRIN     brimonidine 0.2% 0.2 % Drop  Commonly known as: ALPHAGAN     coenzyme Q10 200 mg capsule     cyclobenzaprine 10 MG tablet  Commonly known as: FLEXERIL  Take 1 tablet (10 mg total) by mouth every evening.     diazePAM 5 MG tablet  Commonly known as: VALIUM  Take 1 tablet (5 mg total) by mouth nightly as needed for Anxiety.     dorzolamide-timolol 2-0.5% 22.3-6.8 mg/mL ophthalmic solution  Commonly known as: COSOPT     ergocalciferol 50,000 unit Cap  Commonly known as: ERGOCALCIFEROL     gabapentin 300 MG  capsule  Commonly known as: NEURONTIN  Take 1 capsule (300 mg total) by mouth 3 (three) times daily. Start with once nightly     levothyroxine 100 MCG tablet  Commonly known as: SYNTHROID  Take 1 tablet (100 mcg total) by mouth before breakfast.     metFORMIN 500 MG ER 24hr tablet  Commonly known as: GLUCOPHAGE-XR  Take 1 tablet (500 mg total) by mouth 2 (two) times daily with meals.     metoprolol succinate 25 MG 24 hr tablet  Commonly known as: TOPROL-XL  Take 1 tablet (25 mg total) by mouth once daily.     montelukast 10 mg tablet  Commonly known as: SINGULAIR  Take 1 tablet (10 mg total) by mouth every evening.     nitroGLYCERIN 0.4 MG SL tablet  Commonly known as: NITROSTAT     ondansetron 4 MG Tbdl  Commonly known as: ZOFRAN-ODT  Take 1 tablet (4 mg total) by mouth every 6 (six) hours as needed.     rosuvastatin 10 MG tablet  Commonly known as: CRESTOR     sacubitriL-valsartan 24-26 mg per tablet  Commonly known as: ENTRESTO            SOCIAL HISTORY:     Social History     Socioeconomic History    Marital status:    Occupational History    Occupation: former [employer]; then father's [employer]   Tobacco Use    Smoking status: Former Smoker     Quit date: 1990     Years since quittin.7    Smokeless tobacco: Never Used   Substance and Sexual Activity    Alcohol use: Yes     Comment: occ    Drug use: No    Sexual activity: Yes     Social Determinants of Health     Financial Resource Strain: Low Risk     Difficulty of Paying Living Expenses: Not hard at all   Food Insecurity: No Food Insecurity    Worried About Running Out of Food in the Last Year: Never true    Ran Out of Food in the Last Year: Never true   Transportation Needs: No Transportation Needs    Lack of Transportation (Medical): No    Lack of Transportation (Non-Medical): No   Physical Activity: Unknown    Days of Exercise per Week: 0 days   Stress: No Stress Concern Present    Feeling of Stress : Only a little   Social  Connections: Unknown    Frequency of Communication with Friends and Family: More than three times a week    Frequency of Social Gatherings with Friends and Family: More than three times a week    Active Member of Clubs or Organizations: Yes    Attends Club or Organization Meetings: More than 4 times per year    Marital Status:    Housing Stability: Unknown    Unable to Pay for Housing in the Last Year: No    Unstable Housing in the Last Year: No       FAMILY HISTORY:     Family History   Problem Relation Age of Onset    Cancer Maternal Aunt         cervical    Breast cancer Maternal Aunt     Breast cancer Paternal Grandmother     Diabetes Paternal Grandmother     Stroke Mother     Diabetes Father     Stomach cancer Father     Asthma Sister     Heart disease Brother     Osteoarthritis Sister     No Known Problems Daughter     No Known Problems Daughter     No Known Problems Daughter     Ovarian cancer Neg Hx        REVIEW OF SYSTEMS:   Review of Systems   Constitutional: Positive for malaise/fatigue. Negative for chills, diaphoresis and fever.   HENT: Negative for nosebleeds.    Eyes: Negative for blurred vision, double vision and photophobia.   Respiratory: Positive for shortness of breath. Negative for hemoptysis and wheezing.    Cardiovascular: Positive for leg swelling. Negative for chest pain, palpitations, orthopnea, claudication and PND.   Gastrointestinal: Negative for abdominal pain, blood in stool, heartburn, melena, nausea and vomiting.   Genitourinary: Negative for flank pain and hematuria.   Musculoskeletal: Negative for falls, myalgias and neck pain.   Skin: Negative for rash.   Neurological: Negative for dizziness, seizures, loss of consciousness, weakness and headaches.   Endo/Heme/Allergies: Negative for polydipsia. Does not bruise/bleed easily.   Psychiatric/Behavioral: Negative for depression and memory loss. The patient is not nervous/anxious.        PHYSICAL EXAM:      Vitals:    01/12/22 0927   BP: 132/64   Pulse: 64   Resp: 16    There is no height or weight on file to calculate BMI.            Physical Exam  Vitals reviewed.   Constitutional:       General: She is not in acute distress.     Appearance: She is well-developed and well-nourished. She is obese. She is not ill-appearing, toxic-appearing or diaphoretic.   HENT:      Head: Normocephalic and atraumatic.   Eyes:      General: No scleral icterus.     Extraocular Movements: Extraocular movements intact and EOM normal.      Conjunctiva/sclera: Conjunctivae normal.      Pupils: Pupils are equal, round, and reactive to light.   Neck:      Thyroid: No thyromegaly.      Vascular: Normal carotid pulses. No carotid bruit or JVD.      Trachea: Trachea normal.   Cardiovascular:      Rate and Rhythm: Normal rate and regular rhythm.      Pulses: Intact distal pulses.           Carotid pulses are 2+ on the right side and 2+ on the left side.     Heart sounds: S1 normal and S2 normal. No murmur heard.  No friction rub. No gallop.    Pulmonary:      Effort: Pulmonary effort is normal. No respiratory distress.      Breath sounds: Normal breath sounds. No stridor. No wheezing, rhonchi or rales.   Chest:      Chest wall: No tenderness.   Abdominal:      General: There is no distension.      Palpations: Abdomen is soft. There is no hepatosplenomegaly.   Musculoskeletal:         General: No swelling or tenderness. Normal range of motion.      Cervical back: Normal range of motion and neck supple. No edema or rigidity.      Right lower leg: Edema present.      Left lower leg: Edema present.   Feet:      Right foot:      Skin integrity: No ulcer.      Left foot:      Skin integrity: No ulcer.   Skin:     General: Skin is warm and dry.      Coloration: Skin is not jaundiced.      Findings: No erythema or rash.   Neurological:      General: No focal deficit present.      Mental Status: She is alert and oriented to person, place, and time.       Cranial Nerves: No cranial nerve deficit.   Psychiatric:         Mood and Affect: Mood and affect and mood normal.         Speech: Speech normal.         Behavior: Behavior normal. Behavior is cooperative.         DATA:   EKG: (personally reviewed tracing)  1/12/22 SR 64, low volt    Laboratory:  CBC:  Recent Labs   Lab 02/07/20  1110 02/10/21  0940 08/12/21  0810   WBC 5.60 5.83 6.04   Hemoglobin 13.8 13.8 13.7   Hematocrit 43.8 43.4 42.2   Platelets 186 186 187       CHEMISTRIES:  Recent Labs   Lab 02/07/20  1110 02/10/21  0940 08/12/21  0810   Glucose 137 H 157 H 174 H   Sodium 141 139 141   Potassium 4.5 4.3 4.4   BUN 14 12 13   Creatinine 0.8 0.7 0.8   eGFR if African American >60.0 >60 >60   eGFR if non African American >60.0 >60 >60   Calcium 9.7 9.1 9.5       CARDIAC BIOMARKERS:        COAGS:        LIPIDS/LFTS:  Recent Labs   Lab 02/07/20  1110 02/07/20  1110 06/24/20  1120 02/10/21  0940 08/12/21  0810   Cholesterol 75 L   < > 194 114 L 107 L   Triglycerides 74   < > 210 H 162 H 120   HDL 33 L   < > 38 L 36 L 34 L   LDL Cholesterol 27.2 L   < > 114.0 45.6 L 49.0 L   Non-HDL Cholesterol 42   < > 156 78 73   AST 37  --   --  50 H 59 H   ALT 45 H  --   --  56 H 70 H    < > = values in this interval not displayed.     Lab Results   Component Value Date    TSH 3.521 08/12/2021         Cardiovascular Testing:  Cath 11/15/21 (care everywhere)  No evidence of angiographically significant coronary artery disease.   EDP 19 (per note from Dr. Arredondo)    L MPI 10/26/21 (care everywhere)  Moderate sized moderate intensity partially reversible defect in the mid-distal anterior wall consistent with mixed  infarct/ischemia. The areas of reversibility are the apical anterior and apical septal segments. SDS 3.   GATED - normal LV size, wall thickening, wall motion with EF 72% (normal LV function).   Stress EKG - Sinus rhythm rate 107 with nonspecific STT changes but no ST depression. Negative for arrhythmias.     Echo  10/26/21 (Care everywhere)    Normal right heart size     Mild TR / PAsys ~ 18 mmHg     LA appears enlarged / LAD 49 mm     Normal MV / minimal MR     Normal LV dimensions / EF > 55%        Definity given     Normal diastolic parameters     Mild aortic annulus sclerosis     No AS and no AR     no pericardial effusion       Holter 3/29/21 (care everywhere)  The patient was monitored for 48 hours.  The maximum heart rate was 123   bpm, minimum heart rate was 58 bpm, and the average heart rate was 75 bpm.    Sinus rhythm with 363 PVCs and 10 SVEs including a 3 beat run @ 108 bpm         ASSESSMENT:   # HFpEF, normal cors on cath 11/2021, EDP 19.  Complaining of fatigue and MYLES.  ?mild vol overload on exam.  # HTN, controlled  # HLP on crestor 10mg  # hx lung carcinoid  # SAM on CPAP  # BMI 36    PLAN:   Cont med rx  Stop ASA  Stop metoprolol  Entresto removed from med list (pt never took it and unable to afford)  Quinipril added to med list  Start lasix 20mg qd  Check BMP 2 weeks  RTC 1 month    Doug Oneill MD, FACC

## 2022-01-26 ENCOUNTER — LAB VISIT (OUTPATIENT)
Dept: LAB | Facility: HOSPITAL | Age: 72
End: 2022-01-26
Attending: INTERNAL MEDICINE
Payer: MEDICARE

## 2022-01-26 DIAGNOSIS — E11.9 TYPE 2 DIABETES MELLITUS WITHOUT COMPLICATION, WITHOUT LONG-TERM CURRENT USE OF INSULIN: ICD-10-CM

## 2022-01-26 LAB
ALBUMIN SERPL BCP-MCNC: 4.1 G/DL (ref 3.5–5.2)
ALP SERPL-CCNC: 81 U/L (ref 55–135)
ALT SERPL W/O P-5'-P-CCNC: 55 U/L (ref 10–44)
ANION GAP SERPL CALC-SCNC: 7 MMOL/L (ref 8–16)
AST SERPL-CCNC: 47 U/L (ref 10–40)
BASOPHILS # BLD AUTO: 0.09 K/UL (ref 0–0.2)
BASOPHILS NFR BLD: 1.4 % (ref 0–1.9)
BILIRUB SERPL-MCNC: 0.7 MG/DL (ref 0.1–1)
BUN SERPL-MCNC: 12 MG/DL (ref 8–23)
CALCIUM SERPL-MCNC: 9.6 MG/DL (ref 8.7–10.5)
CHLORIDE SERPL-SCNC: 104 MMOL/L (ref 95–110)
CHOLEST SERPL-MCNC: 104 MG/DL (ref 120–199)
CHOLEST/HDLC SERPL: 2.6 {RATIO} (ref 2–5)
CO2 SERPL-SCNC: 26 MMOL/L (ref 23–29)
CREAT SERPL-MCNC: 0.8 MG/DL (ref 0.5–1.4)
DIFFERENTIAL METHOD: ABNORMAL
EOSINOPHIL # BLD AUTO: 0.5 K/UL (ref 0–0.5)
EOSINOPHIL NFR BLD: 7.8 % (ref 0–8)
ERYTHROCYTE [DISTWIDTH] IN BLOOD BY AUTOMATED COUNT: 12.9 % (ref 11.5–14.5)
EST. GFR  (AFRICAN AMERICAN): >60 ML/MIN/1.73 M^2
EST. GFR  (NON AFRICAN AMERICAN): >60 ML/MIN/1.73 M^2
ESTIMATED AVG GLUCOSE: 151 MG/DL (ref 68–131)
GLUCOSE SERPL-MCNC: 166 MG/DL (ref 70–110)
HBA1C MFR BLD: 6.9 % (ref 4–5.6)
HCT VFR BLD AUTO: 44.5 % (ref 37–48.5)
HDLC SERPL-MCNC: 40 MG/DL (ref 40–75)
HDLC SERPL: 38.5 % (ref 20–50)
HGB BLD-MCNC: 14 G/DL (ref 12–16)
IMM GRANULOCYTES # BLD AUTO: 0.01 K/UL (ref 0–0.04)
IMM GRANULOCYTES NFR BLD AUTO: 0.2 % (ref 0–0.5)
LDLC SERPL CALC-MCNC: 38.8 MG/DL (ref 63–159)
LYMPHOCYTES # BLD AUTO: 2.9 K/UL (ref 1–4.8)
LYMPHOCYTES NFR BLD: 43.5 % (ref 18–48)
MCH RBC QN AUTO: 29.2 PG (ref 27–31)
MCHC RBC AUTO-ENTMCNC: 31.5 G/DL (ref 32–36)
MCV RBC AUTO: 93 FL (ref 82–98)
MONOCYTES # BLD AUTO: 0.5 K/UL (ref 0.3–1)
MONOCYTES NFR BLD: 7.7 % (ref 4–15)
NEUTROPHILS # BLD AUTO: 2.6 K/UL (ref 1.8–7.7)
NEUTROPHILS NFR BLD: 39.4 % (ref 38–73)
NONHDLC SERPL-MCNC: 64 MG/DL
NRBC BLD-RTO: 0 /100 WBC
PLATELET # BLD AUTO: 189 K/UL (ref 150–450)
PMV BLD AUTO: 11.4 FL (ref 9.2–12.9)
POTASSIUM SERPL-SCNC: 4.4 MMOL/L (ref 3.5–5.1)
PROT SERPL-MCNC: 7.6 G/DL (ref 6–8.4)
RBC # BLD AUTO: 4.79 M/UL (ref 4–5.4)
SODIUM SERPL-SCNC: 137 MMOL/L (ref 136–145)
TRIGL SERPL-MCNC: 126 MG/DL (ref 30–150)
WBC # BLD AUTO: 6.65 K/UL (ref 3.9–12.7)

## 2022-01-26 PROCEDURE — 36415 COLL VENOUS BLD VENIPUNCTURE: CPT | Mod: PO | Performed by: INTERNAL MEDICINE

## 2022-01-26 PROCEDURE — 80061 LIPID PANEL: CPT | Performed by: INTERNAL MEDICINE

## 2022-01-26 PROCEDURE — 80053 COMPREHEN METABOLIC PANEL: CPT | Performed by: INTERNAL MEDICINE

## 2022-01-26 PROCEDURE — 83036 HEMOGLOBIN GLYCOSYLATED A1C: CPT | Performed by: INTERNAL MEDICINE

## 2022-01-26 PROCEDURE — 85025 COMPLETE CBC W/AUTO DIFF WBC: CPT | Performed by: INTERNAL MEDICINE

## 2022-02-09 ENCOUNTER — OFFICE VISIT (OUTPATIENT)
Dept: CARDIOLOGY | Facility: CLINIC | Age: 72
End: 2022-02-09
Payer: MEDICARE

## 2022-02-09 VITALS
HEIGHT: 65 IN | BODY MASS INDEX: 36.49 KG/M2 | RESPIRATION RATE: 15 BRPM | DIASTOLIC BLOOD PRESSURE: 62 MMHG | HEART RATE: 80 BPM | OXYGEN SATURATION: 99 % | WEIGHT: 219 LBS | SYSTOLIC BLOOD PRESSURE: 135 MMHG

## 2022-02-09 DIAGNOSIS — R06.02 SHORTNESS OF BREATH: ICD-10-CM

## 2022-02-09 DIAGNOSIS — I50.32 CHRONIC HEART FAILURE WITH PRESERVED EJECTION FRACTION: Primary | ICD-10-CM

## 2022-02-09 DIAGNOSIS — I51.89 DIASTOLIC DYSFUNCTION: ICD-10-CM

## 2022-02-09 DIAGNOSIS — R06.09 DOE (DYSPNEA ON EXERTION): ICD-10-CM

## 2022-02-09 DIAGNOSIS — G47.33 OSA (OBSTRUCTIVE SLEEP APNEA): ICD-10-CM

## 2022-02-09 DIAGNOSIS — E11.9 TYPE 2 DIABETES MELLITUS WITHOUT COMPLICATION, WITHOUT LONG-TERM CURRENT USE OF INSULIN: ICD-10-CM

## 2022-02-09 DIAGNOSIS — E78.5 DYSLIPIDEMIA: ICD-10-CM

## 2022-02-09 PROCEDURE — 99214 PR OFFICE/OUTPT VISIT, EST, LEVL IV, 30-39 MIN: ICD-10-PCS | Mod: S$PBB,,, | Performed by: INTERNAL MEDICINE

## 2022-02-09 PROCEDURE — 99999 PR PBB SHADOW E&M-EST. PATIENT-LVL IV: ICD-10-PCS | Mod: PBBFAC,,, | Performed by: INTERNAL MEDICINE

## 2022-02-09 PROCEDURE — 99214 OFFICE O/P EST MOD 30 MIN: CPT | Mod: PBBFAC | Performed by: INTERNAL MEDICINE

## 2022-02-09 PROCEDURE — 99214 OFFICE O/P EST MOD 30 MIN: CPT | Mod: S$PBB,,, | Performed by: INTERNAL MEDICINE

## 2022-02-09 PROCEDURE — 99999 PR PBB SHADOW E&M-EST. PATIENT-LVL IV: CPT | Mod: PBBFAC,,, | Performed by: INTERNAL MEDICINE

## 2022-02-09 RX ORDER — QUINAPRIL 20 MG/1
20 TABLET ORAL DAILY
Qty: 90 TABLET | Refills: 3 | Status: SHIPPED | OUTPATIENT
Start: 2022-02-09 | End: 2022-05-18 | Stop reason: ALTCHOICE

## 2022-02-09 NOTE — PROGRESS NOTES
CARDIOVASCULAR PROGRESS NOTE    REASON FOR CONSULT:   Alvina Fisher is a 71 y.o. female who presents for f/u MYLES/HFpEF.    PCP: Jacqueline Santillan: Yogi  HISTORY OF PRESENT ILLNESS:   The patient comes in today for follow-up.  She continues to complain of short windedness on exertion.  She denies any palpitations or syncope.  She is also describing some exertional chest discomfort.  There has been no PND, orthopnea, melena, hematuria, or claudicant symptoms.  It appears that her edema has improved.    CARDIOVASCULAR HISTORY:   Cath 11/15/21 normal cors, EDP 19.    SAM on CPAP    PAST MEDICAL HISTORY:     Past Medical History:   Diagnosis Date    Achilles tendon tear     Diabetes mellitus     Dysplasia of cervix     GERD (gastroesophageal reflux disease)     Glaucoma (increased eye pressure)     Hypertension     Lung nodule     Sinusitis     Sleep apnea     Thyroid disease     Urinary incontinence     occ urge       PAST SURGICAL HISTORY:     Past Surgical History:   Procedure Laterality Date    ACHILLES TENDON SURGERY      ARTHROSCOPIC CHONDROPLASTY OF KNEE JOINT Right 5/3/2019    Procedure: ARTHROSCOPY, KNEE, WITH CHONDROPLASTY;  Surgeon: Doug Alexander MD;  Location: [facility];  Service: Orthopedics;  Laterality: Right;    ARTHROSCOPY OF KNEE Right 5/3/2019    Procedure: ARTHROSCOPY, KNEE;  Surgeon: Doug Alexander MD;  Location: [facility] OR;  Service: Orthopedics;  Laterality: Right;    CERVIX LESION DESTRUCTION      cryo x 2    CHOLECYSTECTOMY      EXCISION OF MEDIAL MENISCUS OF KNEE Right 5/3/2019    Procedure: MENISCECTOMY, KNEE, MEDIAL;  Surgeon: Doug Alexander MD;  Location: [facility] OR;  Service: Orthopedics;  Laterality: Right;    HYSTERECTOMY      BLAKE/ovaries remain    INJECTION OF JOINT Right 5/3/2019    Procedure: INJECTION, JOINT - SYNVISE INJECTION;  Surgeon: Doug Alexander MD;  Location: [facility];  Service: Orthopedics;  Laterality: Right;  SYNVISE INJECTION FROM PHARMACY    LAMINECTOMY   1990    L5 S1    OOPHORECTOMY      TUBAL LIGATION         ALLERGIES AND MEDICATION:     Review of patient's allergies indicates:   Allergen Reactions    Oxycodone-acetaminophen Itching and Nausea Only     NOT ALLERGIC TO ACETAMINOPHEN, HAS TAKEN IT         Medication List          Accurate as of February 9, 2022  9:48 AM. If you have any questions, ask your nurse or doctor.            CONTINUE taking these medications    acetaminophen 500 MG tablet  Commonly known as: TYLENOL     albuterol 90 mcg/actuation inhaler  Commonly known as: PROAIR HFA  Inhale 2 puffs into the lungs every 6 (six) hours as needed for Wheezing or Shortness of Breath.     brimonidine 0.2% 0.2 % Drop  Commonly known as: ALPHAGAN     coenzyme Q10 200 mg capsule     diazePAM 5 MG tablet  Commonly known as: VALIUM  Take 1 tablet (5 mg total) by mouth nightly as needed for Anxiety.     dorzolamide-timolol 2-0.5% 22.3-6.8 mg/mL ophthalmic solution  Commonly known as: COSOPT     ergocalciferol 50,000 unit Cap  Commonly known as: ERGOCALCIFEROL     furosemide 20 MG tablet  Commonly known as: LASIX  Take 1 tablet (20 mg total) by mouth once daily.     gabapentin 300 MG capsule  Commonly known as: NEURONTIN  Take 1 capsule (300 mg total) by mouth 3 (three) times daily. Start with once nightly     levothyroxine 100 MCG tablet  Commonly known as: SYNTHROID  Take 1 tablet (100 mcg total) by mouth before breakfast.     metFORMIN 500 MG ER 24hr tablet  Commonly known as: GLUCOPHAGE-XR  Take 1 tablet (500 mg total) by mouth 2 (two) times daily with meals.     montelukast 10 mg tablet  Commonly known as: SINGULAIR  Take 1 tablet (10 mg total) by mouth every evening.     quinapriL 10 MG tablet  Commonly known as: ACCUPRIL  Take 1 tablet (10 mg total) by mouth once daily.     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   Tobacco Use    Smoking status: Former Smoker     Quit date: 1990     Years since quittin.8    Smokeless tobacco: Never Used   Substance and Sexual Activity    Alcohol use: Yes     Comment: occ    Drug use: No    Sexual activity: Yes     Social Determinants of Health     Financial Resource Strain: Low Risk     Difficulty of Paying Living Expenses: Not hard at all   Food Insecurity: No Food Insecurity    Worried About Running Out of Food in the Last Year: Never true    Ran Out of Food in the Last Year: Never true   Transportation Needs: No Transportation Needs    Lack of Transportation (Medical): No    Lack of Transportation (Non-Medical): No   Physical Activity: Unknown    Days of Exercise per Week: 0 days   Stress: No Stress Concern Present    Feeling of Stress : Only a little   Social Connections: Unknown    Frequency of Communication with Friends and Family: More than three times a week    Frequency of Social Gatherings with Friends and Family: Twice a week    Active Member of Clubs or Organizations: Yes    Attends Club or Organization Meetings: More than 4 times per year    Marital Status:    Housing Stability: Low Risk     Unable to Pay for Housing in the Last Year: No    Number of Places Lived in the Last Year: 1    Unstable Housing in the Last Year: No       FAMILY HISTORY:     Family History   Problem Relation Age of Onset    Cancer Maternal Aunt         cervical    Breast cancer Maternal Aunt     Breast cancer Paternal Grandmother     Diabetes Paternal Grandmother     Stroke Mother     Diabetes Father     Stomach cancer Father     Asthma Sister     Heart disease Brother     Osteoarthritis Sister     No Known Problems Daughter     No Known Problems Daughter     No Known Problems Daughter     Ovarian cancer Neg Hx        REVIEW OF SYSTEMS:   Review of Systems   Constitutional: Negative for chills,  "diaphoresis, fever and malaise/fatigue.   HENT: Negative for nosebleeds.    Eyes: Negative for blurred vision, double vision and photophobia.   Respiratory: Positive for shortness of breath. Negative for hemoptysis and wheezing.    Cardiovascular: Negative for chest pain, palpitations, orthopnea, claudication, leg swelling and PND.   Gastrointestinal: Negative for abdominal pain, blood in stool, heartburn, melena, nausea and vomiting.   Genitourinary: Negative for flank pain and hematuria.   Musculoskeletal: Negative for falls, myalgias and neck pain.   Skin: Negative for rash.   Neurological: Negative for dizziness, seizures, loss of consciousness, weakness and headaches.   Endo/Heme/Allergies: Negative for polydipsia. Does not bruise/bleed easily.   Psychiatric/Behavioral: Negative for depression and memory loss. The patient is not nervous/anxious.        PHYSICAL EXAM:     Vitals:    02/09/22 0933   BP: 135/62   Pulse: 80   Resp: 15    Body mass index is 36.44 kg/m².  Weight: 99.3 kg (219 lb)   Height: 5' 5" (165.1 cm)     Physical Exam  Vitals reviewed.   Constitutional:       General: She is not in acute distress.     Appearance: She is well-developed. She is obese. She is not ill-appearing, toxic-appearing or diaphoretic.   HENT:      Head: Normocephalic and atraumatic.   Eyes:      General: No scleral icterus.     Extraocular Movements: Extraocular movements intact.      Conjunctiva/sclera: Conjunctivae normal.      Pupils: Pupils are equal, round, and reactive to light.   Neck:      Thyroid: No thyromegaly.      Vascular: Normal carotid pulses. No carotid bruit or JVD.      Trachea: Trachea normal.   Cardiovascular:      Rate and Rhythm: Normal rate and regular rhythm.      Pulses:           Carotid pulses are 2+ on the right side and 2+ on the left side.     Heart sounds: S1 normal and S2 normal. No murmur heard.  No friction rub. No gallop.    Pulmonary:      Effort: Pulmonary effort is normal. No " respiratory distress.      Breath sounds: Normal breath sounds. No stridor. No wheezing, rhonchi or rales.   Chest:      Chest wall: No tenderness.   Abdominal:      General: There is no distension.      Palpations: Abdomen is soft.   Musculoskeletal:         General: No swelling or tenderness. Normal range of motion.      Cervical back: Normal range of motion and neck supple. No edema or rigidity.      Right lower leg: No edema.      Left lower leg: No edema.   Feet:      Right foot:      Skin integrity: No ulcer.      Left foot:      Skin integrity: No ulcer.   Skin:     General: Skin is warm and dry.      Coloration: Skin is not jaundiced.      Findings: No erythema or rash.   Neurological:      General: No focal deficit present.      Mental Status: She is alert and oriented to person, place, and time.      Cranial Nerves: No cranial nerve deficit.   Psychiatric:         Mood and Affect: Mood normal.         Speech: Speech normal.         Behavior: Behavior normal. Behavior is cooperative.         DATA:   EKG: (personally reviewed tracing)  1/12/22 SR 64, low volt    Laboratory:  CBC:  Recent Labs   Lab 02/10/21  0940 08/12/21  0810 01/26/22  0935   WBC 5.83 6.04 6.65   Hemoglobin 13.8 13.7 14.0   Hematocrit 43.4 42.2 44.5   Platelets 186 187 189       CHEMISTRIES:  Recent Labs   Lab 02/10/21  0940 08/12/21  0810 01/26/22  0935   Glucose 157 H 174 H 166 H   Sodium 139 141 137   Potassium 4.3 4.4 4.4   BUN 12 13 12   Creatinine 0.7 0.8 0.8   eGFR if African American >60 >60 >60.0   eGFR if non African American >60 >60 >60.0   Calcium 9.1 9.5 9.6       CARDIAC BIOMARKERS:        COAGS:        LIPIDS/LFTS:  Recent Labs   Lab 02/10/21  0940 08/12/21  0810 01/26/22  0935   Cholesterol 114 L 107 L 104 L   Triglycerides 162 H 120 126   HDL 36 L 34 L 40   LDL Cholesterol 45.6 L 49.0 L 38.8 L   Non-HDL Cholesterol 78 73 64   AST 50 H 59 H 47 H   ALT 56 H 70 H 55 H     Lab Results   Component Value Date    TSH 3.521  08/12/2021         Cardiovascular Testing:  Cath 11/15/21 (care everywhere)  No evidence of angiographically significant coronary artery disease.   EDP 19 (per note from Dr. Arredondo)    L MPI 10/26/21 (care everywhere)  Moderate sized moderate intensity partially reversible defect in the mid-distal anterior wall consistent with mixed  infarct/ischemia. The areas of reversibility are the apical anterior and apical septal segments. SDS 3.   GATED - normal LV size, wall thickening, wall motion with EF 72% (normal LV function).   Stress EKG - Sinus rhythm rate 107 with nonspecific STT changes but no ST depression. Negative for arrhythmias.     Echo 10/26/21 (Care everywhere)    Normal right heart size     Mild TR / PAsys ~ 18 mmHg     LA appears enlarged / LAD 49 mm     Normal MV / minimal MR     Normal LV dimensions / EF > 55%        Definity given     Normal diastolic parameters     Mild aortic annulus sclerosis     No AS and no AR     no pericardial effusion       Holter 3/29/21 (care everywhere)  The patient was monitored for 48 hours.  The maximum heart rate was 123   bpm, minimum heart rate was 58 bpm, and the average heart rate was 75 bpm.    Sinus rhythm with 363 PVCs and 10 SVEs including a 3 beat run @ 108 bpm         ASSESSMENT:   # HFpEF, normal cors on cath 11/2021, EDP 19.  Complaining of persistent MYLES.  Appears euvolemic.  # HTN, controlled  # HLP on crestor 10mg  # hx lung carcinoid  # SAM on CPAP  # BMI 36, stable vs last OV    PLAN:   Cont med rx  Inc quinipril 20mg qd (prev unable to afford entresto)  Check BMP 2 weeks  RTC 1 month    Doug Oneill MD, FACC

## 2022-02-16 PROBLEM — K76.0 FATTY LIVER: Status: ACTIVE | Noted: 2022-02-16

## 2022-02-16 PROBLEM — E66.01 SEVERE OBESITY (BMI 35.0-39.9) WITH COMORBIDITY: Status: ACTIVE | Noted: 2022-02-16

## 2022-02-17 ENCOUNTER — OFFICE VISIT (OUTPATIENT)
Dept: FAMILY MEDICINE | Facility: CLINIC | Age: 72
End: 2022-02-17
Payer: MEDICARE

## 2022-02-17 VITALS
BODY MASS INDEX: 35.64 KG/M2 | OXYGEN SATURATION: 98 % | WEIGHT: 213.94 LBS | DIASTOLIC BLOOD PRESSURE: 70 MMHG | SYSTOLIC BLOOD PRESSURE: 102 MMHG | HEART RATE: 85 BPM | TEMPERATURE: 98 F | HEIGHT: 65 IN

## 2022-02-17 DIAGNOSIS — E66.01 SEVERE OBESITY (BMI 35.0-39.9) WITH COMORBIDITY: ICD-10-CM

## 2022-02-17 DIAGNOSIS — K76.0 FATTY LIVER: ICD-10-CM

## 2022-02-17 DIAGNOSIS — J31.0 NONALLERGIC RHINITIS: ICD-10-CM

## 2022-02-17 DIAGNOSIS — E78.5 DYSLIPIDEMIA: ICD-10-CM

## 2022-02-17 DIAGNOSIS — Z78.0 ASYMPTOMATIC MENOPAUSAL STATE: ICD-10-CM

## 2022-02-17 DIAGNOSIS — E06.3 HYPOTHYROIDISM DUE TO HASHIMOTO'S THYROIDITIS: ICD-10-CM

## 2022-02-17 DIAGNOSIS — E11.42 TYPE 2 DIABETES MELLITUS WITH DIABETIC POLYNEUROPATHY, WITHOUT LONG-TERM CURRENT USE OF INSULIN: ICD-10-CM

## 2022-02-17 DIAGNOSIS — D3A.090 CARCINOID TUMOR OF LUNG, UNSPECIFIED WHETHER MALIGNANT: ICD-10-CM

## 2022-02-17 DIAGNOSIS — Z00.00 NORMAL PHYSICAL EXAM: Primary | ICD-10-CM

## 2022-02-17 DIAGNOSIS — K63.5 HYPERPLASTIC POLYP OF SIGMOID COLON: ICD-10-CM

## 2022-02-17 DIAGNOSIS — E03.8 HYPOTHYROIDISM DUE TO HASHIMOTO'S THYROIDITIS: ICD-10-CM

## 2022-02-17 PROCEDURE — 99215 OFFICE O/P EST HI 40 MIN: CPT | Mod: PBBFAC,PO | Performed by: INTERNAL MEDICINE

## 2022-02-17 PROCEDURE — 99999 PR PBB SHADOW E&M-EST. PATIENT-LVL V: CPT | Mod: PBBFAC,,, | Performed by: INTERNAL MEDICINE

## 2022-02-17 PROCEDURE — 99397 PR PREVENTIVE VISIT,EST,65 & OVER: ICD-10-PCS | Mod: S$PBB,,, | Performed by: INTERNAL MEDICINE

## 2022-02-17 PROCEDURE — 99999 PR PBB SHADOW E&M-EST. PATIENT-LVL V: ICD-10-PCS | Mod: PBBFAC,,, | Performed by: INTERNAL MEDICINE

## 2022-02-17 PROCEDURE — 99397 PER PM REEVAL EST PAT 65+ YR: CPT | Mod: S$PBB,,, | Performed by: INTERNAL MEDICINE

## 2022-02-17 RX ORDER — METFORMIN HYDROCHLORIDE 500 MG/1
1000 TABLET, EXTENDED RELEASE ORAL 2 TIMES DAILY WITH MEALS
Qty: 360 TABLET | Refills: 3 | Status: SHIPPED | OUTPATIENT
Start: 2022-02-17 | End: 2023-02-06 | Stop reason: SDUPTHER

## 2022-02-17 RX ORDER — AZELASTINE 1 MG/ML
1 SPRAY, METERED NASAL 2 TIMES DAILY
Qty: 30 ML | Refills: 11 | Status: SHIPPED | OUTPATIENT
Start: 2022-02-17 | End: 2023-03-03

## 2022-02-17 RX ORDER — QUINAPRIL 10 MG/1
TABLET ORAL
COMMUNITY
End: 2022-05-18 | Stop reason: ALTCHOICE

## 2022-02-17 RX ORDER — CHOLECALCIFEROL (VITAMIN D3) 25 MCG
TABLET ORAL
COMMUNITY
Start: 2021-10-20

## 2022-02-17 NOTE — PROGRESS NOTES
This note was created by combination of typed  and M-Modal dictation.  Transcription errors may be present.  If there are any questions, please contact me.    Assessment and Plan:   Normal physical exam  Hyperplastic polyp of sigmoid colon  Asymptomatic menopausal state  -check DEXA  -     DXA Bone Density Spine And Hip; Future; Expected date: 02/17/2022    Type 2 diabetes mellitus with diabetic polyneuropathy, without long-term current use of insulin  Severe obesity (BMI 35.0-39.9) with comorbidity  -notes morning glc going up. a1c was good however  Plans to start walking more with daughter  Discussed benefits of physical activity and weight loss  On mid range dose of metformin 500 bid  Increase to 1000 bid  Notes neuropathic sensation in the feet.  Already on gabapentin for chest wall pain.  -     metFORMIN (GLUCOPHAGE-XR) 500 MG ER 24hr tablet; Take 2 tablets (1,000 mg total) by mouth 2 (two) times daily with meals.  Dispense: 360 tablet; Refill: 3  -     Hemoglobin A1C; Future; Expected date: 02/18/2022  -     CBC Auto Differential; Future; Expected date: 02/18/2022  -     Comprehensive Metabolic Panel; Future; Expected date: 02/18/2022    Dyslipidemia  -pre visit labs good on crestor.    Nonallergic rhinitis  -avoid nasal steroid with glaucoma  singulair insufficient  Start astelin  Can supplement with OTC antihistamine  -     azelastine (ASTELIN) 137 mcg (0.1 %) nasal spray; 1 spray (137 mcg total) by Nasal route 2 (two) times daily.  Dispense: 30 mL; Refill: 11    Hypothyroidism due to Hashimoto's thyroiditis  -last check in the fall was good. Repeat next labs on LT  -     TSH; Future; Expected date: 02/18/2022    Fatty liver  -work on TLC    Carcinoid tumor of lung, unspecified whether malignant  -followed by outside heme onc    HFpEF  -reportedly elevated end-diastolic pressure on left heart catheterization.  Was started on Entresto but unable to afford it.  She wanted 2nd opinion about her  diagnosis so she came to [facility] for evaluation, concurred with diagnosis.  Because of her fatigue I believe Cardiology stopped her metoprolol.  Increased her quinapril to compensate.  Started her on Lasix.  She had been seeing Dr. Arredondo for a very long time and wants to return back to him.  She is going to schedule follow-up with him and see if her insurance might cover Entresto now that it is a new year.  Mother had had CHF but lived to her 90s.  She notes fatigue ever since COVID, discussed that it is possible this could be due to heart failure but also could be due to lingering effects from coronavirus infection.    Medications Discontinued During This Encounter   Medication Reason    metFORMIN (GLUCOPHAGE-XR) 500 MG ER 24hr tablet        meds sent this encounter:  Medications Ordered This Encounter   Medications    azelastine (ASTELIN) 137 mcg (0.1 %) nasal spray     Si spray (137 mcg total) by Nasal route 2 (two) times daily.     Dispense:  30 mL     Refill:  11    metFORMIN (GLUCOPHAGE-XR) 500 MG ER 24hr tablet     Sig: Take 2 tablets (1,000 mg total) by mouth 2 (two) times daily with meals.     Dispense:  360 tablet     Refill:  3     Increased dose       Follow Up: No follow-ups on file.    Subjective:     Chief Complaint   Patient presents with    Annual Exam    Nasal Congestion       HPI  Alvina is a 71 y.o. female, last appointment with this clinic was 10/20/2021.  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 Patient has had a hysterectomy.    Last visit me last August  Left flank pain suspected musculoskeletal  Pill esophagitis from  doxycycline.  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  followed by Hematology-Oncology.  Hypothyroid labs good on levothyroxine  Adjustment disorder, Valium p.r.n.  Gabapentin for chest wall pain    Saw her pulmonologist Dr. Calix.  SAM a not using CPAP because of recall.  Subsequent anxiety.  Had complained of dyspnea.  Normal physical exam. Continue CHIQUITA    Saw her oncologist in follow-up.  Rising LFTs.  Ordered abdominal ultrasound.    09/08/2021 right upper quadrant ultrasound  Previous cholecystectomy. No biliary ductal dilatation is seen.  Diffusely increased parenchymal echogenicity of the liver most consistent with steatosis. There is no sonographic evidence of cirrhosis and no focal liver lesions are seen.    X-ray right hip osteoarthritis    Saw her outside cardiologist in October.  Dyspnea plan was exercise Lexiscan and echo    10/26/21 Exercise lexiscan   Moderately abnormal nuclear stress test as below.     10/26/21 TTE   CONCLUSIONS    NSR 71 bpm    Normal right heart size    Mild TR / PAsys ~ 18 mmHg    LA appears enlarged / LAD 49 mm    Normal MV / minimal MR    Normal LV dimensions / EF > 55%       Definity given    Normal diastolic parameters    Mild aortic annulus sclerosis    No AS and no AR    no pericardial effusion      11/15/2021 left heart catheterization no evidence of angiographically significant coronary artery disease.  Elevated EDP.    On follow-up, was started on Crestor.    Because of her elevated EDP she was started on Entresto.  It was expensive and the patient never filled it.  She came to [facility] for 2nd opinion.  Plan was stop metoprolol.  Add quinapril.  Start Lasix.    Saw cardiology in follow-up.  Continue dyspnea.  Increased quinapril.    Pre visit labs  CBC normal  CMP elevated LFTs seen previously  Lipid profile good  A1c 6.9 from 6.8  Microalbumin normal    Has  multiple concerns  -reportedly elevated end-diastolic pressure on left heart catheterization.  Was started on Entresto but unable to afford it.  She wanted 2nd opinion about her diagnosis so she came to [facility] for evaluation, concurred with diagnosis.  She was hoping that our cardiologist would disagree with the diagnosis and not diagnose her with heart failure.  Because of her fatigue I believe Cardiology stopped her metoprolol.  Increased her quinapril to compensate.  Started her on Lasix.  She had been seeing Dr. Arredondo for a very long time and wants to return back to him.  She is going to schedule follow-up with him and see if her insurance might cover Entresto now that it is a new year.  She is concerned about prognosis.  Her mother had heart failure as well.  She managed to live into her 90s.  And she did not institute therapeutic lifestyle modification like the patient plans to do for herself.  She has stop the metoprolol and she is taking 2 of the quinapril 10 mg.    She has been noticing fatigue ever since her COVID infection.  She is not sure whether this is due to the heart failure or if it is due to lingering effects of COVID.  Feels like sometimes she has foggy headed.  Fatigue.    Diabetes, she is concerned about her blood sugars.  On testing her blood sugars are in the high 100s.  She plans to institute therapeutic lifestyle changes by walking more with her daughter.  She used to walk before.  We talked about the health benefits of exercise on heart function, brain function, and diabetes and also her known fatty liver.    C/o sinus congestion.  Never had it like this.  X months. Like a faucet.   Has rx for singulair  Tried claritin without relief. Took one day.   Tried afrin otc today. Is aware of rhinitis medicamentosa  Avoiding nasal steroid b/c of glaucoma    Takes vit D3 1000 IU   And takes rx vit D 50k weekly. Will take until runs out and then stay on maintenance 1000 IU    Still taking  gabapentin for chest wall pain.  Sometimes she can feel it in the middle of night when she rolls over.    And notes neuropathy in the feet; used to be one foot with hx of back issues but now the other foot as well.     Patient Care Team:  Clement Phillips MD as PCP - General (Internal Medicine)  Yuriy Amor MD as Consulting Physician (Internal Medicine)  Radha Calix MD (Pulmonary Disease)  Edmund Cantrell MD as Consulting Physician (Cardiothoracic Surgery)  Wilbert Arredondo MD (Cardiology)  Chris Muro MD as Consulting Physician (Ophthalmology)  [facility] (Gastroenterology)  Basilia Medley MA as Care Coordinator  David Bansal MD (Ophthalmology)  Hayley Pink MD (Hematology and Oncology)  Bonnie Francis as Digital Medicine Health   Clement Phillips MD as Diabetes Digital Medicine Responsible Provider (Internal Medicine)  Sofie Carrera PharmD as Diabetes Digital Medicine Clinician (Pharmacist)  Medicare Shared Savings Program as Diabetes Digital Medicine Contract    Patient Active Problem List    Diagnosis Date Noted    Fatty liver 02/16/2022 09/08/2021 right upper quadrant ultrasound  Previous cholecystectomy. No biliary ductal dilatation is seen.  Diffusely increased parenchymal echogenicity of the liver most consistent with steatosis. There is no sonographic evidence of cirrhosis and no focal liver lesions are seen.      Severe obesity (BMI 35.0-39.9) with comorbidity 02/16/2022    Dyslipidemia 09/21/2020     10/26/2021 TTE LV normal size and thickness.  Normal LV systolic function LVEF 60-65%.  Normal diastolic function. RV normal size and systolic function.  10/26/2021 nuclear medicine stress test abnormal moderate size moderate intensity partially reversible defect mid distal anterior wall.  11/15/2021 left heart catheterization no evidence of angiographically significant coronary artery disease.  By report, elevated EDP.       Hyperplastic polyp of sigmoid colon 08/19/2020 8/31/2017 Colonoscopy 3 mm rectal polyp.  Large internal hemorrhoids.  Hyperplastic polyp      Chronic superficial gastritis without bleeding on EGD 2017 08/19/2020 8/31/2017 EGD normal esophagus.  Patchy mild erythema of antrum.  Biopsied.  Normal duodenum.  Biopsy mild chronic gastritis H pylori negative      Hypothyroidism due to Hashimoto's thyroiditis 07/08/2020    Type 2 diabetes mellitus without complication, without long-term current use of insulin 06/25/2020    Hepatitis C antibody test positive but VL negative, either exposed/immune or false positive initial screening 02/17/2020    Carcinoid tumor of lung 02/07/2020     History of left pulmonary nodule.  Seen by outside CT surgery.  Had previously been sent to intervention Radiology but was unable to biopsy the lesion.  Increased in size from initial 0.7-1.3 cm over 3 years.    9/12/19 CT-guided biopsy:  LUNG, LEFT LOWER LOBE, CORE BIOPSY:   - SMALL FRAGMENTS OF BENIGN PULMONARY ALVEOLAR PARENCHYMA.   - NO EVIDENCE OF NEOPLASM OR GRANULOMATOUS DISEASE.   6/11/20 lung bx  LUNG, NEEDLE BIOPSY, CLINICALLY LEFT LOWER LOBE:  --ATYPICAL ADENOMATOUS HYPERPLASIA ( 0.5 MM LESION, AS MEASURED ON   SLIDE).   She underwent on July 31, 2020 a left lower lobe wedge resection of the mass with completion robotic lobectomy and mediastinal lymph node dissection.  The final pathology report showed the presence of low-grade carcinoid tumor measuring 1.20 cm that was low-grade with a Ki-67 of 3%. The margins were negative. The lymph node at the level 7, 8, 10 and 11 came negative for metastatic disease. There was no direct invasion of the adjacent structure no lymphovascular invasion. Her final stage was a pT1 pN0 pMX.  9/10/20 PET/CT neuroendocrine study:  1.   Postoperative changes of recent left lower lobe pulmonary nodule wedge resection with mild metabolic activity along the lateral margin of the suture line  likely being postoperative in nature. Continued attention at this site is warranted on future imaging.  2.   No evidence of metastatic disease identified.        Family history of heart disease Dr. Arredondo 02/07/2020     10/03/2013 nuclear medicine stress test no significant EKG changes.  No chest pain. Normal perfusion scan.  Normal LV systolic function  10/03/2013 TTE normal LV size wall thickness and systolic function.  05/19/17 Nuclear stress test: Five minutes standard Dario protocol. 1 mm horizontal ST depression. Substernal chest heaviness and tightness, resolved in recovery. Perfusion scan normal.  05/17 Echocardiogram shows normal cardiac function. EF normal. Greater than 50%. Grade I diastolic function.  02/02/18 Coronary calcium score was 46 with 41 in the LAD, circumflex was 5. Her liver measured 41 Hounsfield units. The spleen could not be visualized. These findings are consistent with fatty liver. The textural appearance to me of the liver was that of fatty liver. She had normal origin of the coronary arteries, normal configuration of the pulmonary veins, left atrium 35 mm, calcified plaque was also seen in the aortic annulus. Per Radiology, she had a 1 cm nodule in the left lower lobe. Radiology recommended followup in six months with CT.  6/22/20 nu med stress test  Nuclear scans show a small area of possible anterior wall ischemia. This could also be secondary to breast artefact. Clinical      correlation suggested.     Gated scan is wnl. 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  Review of patient's allergies indicates:   Allergen Reactions    Oxycodone-acetaminophen Itching and Nausea Only     NOT ALLERGIC TO ACETAMINOPHEN, HAS TAKEN IT        Medication List with Changes/Refills   Current Medications    ACETAMINOPHEN (TYLENOL) 500 MG TABLET    Take 500 mg by mouth every 6 (six) hours as needed for Pain.    ALBUTEROL (PROAIR HFA) 90 MCG/ACTUATION INHALER    Inhale 2 puffs into the lungs every 6 (six) hours as needed for Wheezing or Shortness of Breath.    BRIMONIDINE 0.2% (ALPHAGAN) 0.2 % DROP        COENZYME Q10 200 MG CAPSULE    Take 200 mg by mouth.    DIAZEPAM (VALIUM) 5 MG TABLET    Take 1 tablet (5 mg total) by mouth nightly as needed for Anxiety.    DORZOLAMIDE-TIMOLOL 2-0.5% (COSOPT) 22.3-6.8 MG/ML OPHTHALMIC SOLUTION        ERGOCALCIFEROL (ERGOCALCIFEROL) 50,000 UNIT CAP    Take 50,000 Units by mouth once a week.    FUROSEMIDE (LASIX) 20 MG TABLET    Take 1 tablet (20 mg total) by mouth once daily.    GABAPENTIN (NEURONTIN) 300 MG CAPSULE    Take 1 capsule (300 mg total) by mouth 3 (three) times daily. Start with once nightly    LEVOTHYROXINE (SYNTHROID) 100 MCG TABLET    Take 1 tablet (100 mcg total) by mouth before breakfast.    METFORMIN (GLUCOPHAGE-XR) 500 MG ER 24HR TABLET    Take 1 tablet (500 mg total) by mouth 2 (two) times daily with meals.    MONTELUKAST (SINGULAIR) 10 MG TABLET    Take 1 tablet (10 mg total) by mouth every evening.    QUINAPRIL (ACCUPRIL) 10 MG TABLET        QUINAPRIL (ACCUPRIL) 20 MG TABLET    Take 1 tablet (20 mg total) by mouth once daily.    ROSUVASTATIN (CRESTOR) 10 MG TABLET    Take 10 mg by mouth nightly.    VITAMIN D (VITAMIN D3) 1000  "UNITS TAB            Objective:   Physical Exam   Vitals:    02/17/22 0926   BP: 102/70   Pulse: 85   Temp: 97.6 °F (36.4 °C)   TempSrc: Oral   SpO2: 98%   Weight: 97 kg (213 lb 15.3 oz)   Height: 5' 5" (1.651 m)    Body mass index is 35.6 kg/m².  Weight: 97 kg (213 lb 15.3 oz)   Height: 5' 5" (165.1 cm)     Physical Exam  Constitutional:       General: She is not in acute distress.     Appearance: She is well-developed and well-nourished.   Eyes:      Extraocular Movements: EOM normal.   Cardiovascular:      Rate and Rhythm: Normal rate and regular rhythm.      Pulses:           Dorsalis pedis pulses are 3+ on the right side and 3+ on the left side.        Posterior tibial pulses are 3+ on the right side and 3+ on the left side.      Heart sounds: Normal heart sounds. No murmur heard.      Pulmonary:      Effort: Pulmonary effort is normal.      Breath sounds: Normal breath sounds.   Musculoskeletal:         General: Normal range of motion.      Right lower leg: No edema.      Left lower leg: No edema.      Right foot: No deformity.      Left foot: No deformity.   Feet:      Right foot:      Protective Sensation: 5 sites tested. 5 sites sensed.      Skin integrity: No ulcer, blister, skin breakdown, erythema or warmth.      Left foot:      Protective Sensation: 5 sites tested. 5 sites sensed.      Skin integrity: No ulcer, blister, skin breakdown, erythema or warmth.   Skin:     General: Skin is warm and dry.   Neurological:      Mental Status: She is alert and oriented to person, place, and time.      Coordination: Coordination normal.   Psychiatric:         Mood and Affect: Mood and affect normal.         Behavior: Behavior normal.         Thought Content: Thought content normal.         Component      Latest Ref Rng & Units 1/26/2022 8/12/2021   WBC      3.90 - 12.70 K/uL 6.65 6.04   RBC      4.00 - 5.40 M/uL 4.79 4.57   Hemoglobin      12.0 - 16.0 g/dL 14.0 13.7   Hematocrit      37.0 - 48.5 % 44.5 42.2   MCV   "    82 - 98 fL 93 92   MCH      27.0 - 31.0 pg 29.2 30.0   MCHC      32.0 - 36.0 g/dL 31.5 (L) 32.5   RDW      11.5 - 14.5 % 12.9 13.9   Platelets      150 - 450 K/uL 189 187   MPV      9.2 - 12.9 fL 11.4 11.3   Immature Granulocytes      0.0 - 0.5 % 0.2 0.2   Gran # (ANC)      1.8 - 7.7 K/uL 2.6 2.3   Immature Grans (Abs)      0.00 - 0.04 K/uL 0.01 0.01   Lymph #      1.0 - 4.8 K/uL 2.9 2.8   Mono #      0.3 - 1.0 K/uL 0.5 0.6   Eos #      0.0 - 0.5 K/uL 0.5 0.4   Baso #      0.00 - 0.20 K/uL 0.09 0.07   nRBC      0 /100 WBC 0 0   Gran %      38.0 - 73.0 % 39.4 37.5 (L)   Lymph %      18.0 - 48.0 % 43.5 45.9   Mono %      4.0 - 15.0 % 7.7 9.4   Eosinophil %      0.0 - 8.0 % 7.8 5.8   Basophil %      0.0 - 1.9 % 1.4 1.2   Differential Method       Automated Automated   Sodium      136 - 145 mmol/L 137 141   Potassium      3.5 - 5.1 mmol/L 4.4 4.4   Chloride      95 - 110 mmol/L 104 105   CO2      23 - 29 mmol/L 26 23   Glucose      70 - 110 mg/dL 166 (H) 174 (H)   BUN      8 - 23 mg/dL 12 13   Creatinine      0.5 - 1.4 mg/dL 0.8 0.8   Calcium      8.7 - 10.5 mg/dL 9.6 9.5   PROTEIN TOTAL      6.0 - 8.4 g/dL 7.6 7.4   Albumin      3.5 - 5.2 g/dL 4.1 3.9   BILIRUBIN TOTAL      0.1 - 1.0 mg/dL 0.7 0.9   Alkaline Phosphatase      55 - 135 U/L 81 88   AST      10 - 40 U/L 47 (H) 59 (H)   ALT      10 - 44 U/L 55 (H) 70 (H)   Anion Gap      8 - 16 mmol/L 7 (L) 13   eGFR if African American      >60 mL/min/1.73 m:2 >60.0 >60   eGFR if non African American      >60 mL/min/1.73 m:2 >60.0 >60   Cholesterol      120 - 199 mg/dL 104 (L) 107 (L)   Triglycerides      30 - 150 mg/dL 126 120   HDL      40 - 75 mg/dL 40 34 (L)   LDL Cholesterol External      63.0 - 159.0 mg/dL 38.8 (L) 49.0 (L)   HDL/Cholesterol Ratio      20.0 - 50.0 % 38.5 31.8   Total Cholesterol/HDL Ratio      2.0 - 5.0 2.6 3.1   Non-HDL Cholesterol      mg/dL 64 73   Microalbumin, Urine      ug/mL <5.0    Creatinine, Urine      15.0 - 325.0 mg/dL 13.0 (L)     MICROALB/CREAT RATIO      0.0 - 30.0 ug/mg Unable to calculate    Hemoglobin A1C External      4.0 - 5.6 % 6.9 (H) 6.8 (H)   Estimated Avg Glucose      68 - 131 mg/dL 151 (H) 148 (H)   TSH      0.400 - 4.000 uIU/mL  3.521

## 2022-02-17 NOTE — PATIENT INSTRUCTIONS
Increase your metformin. take 2 tablets in the morning (with breakfast) and 1 tablet in the evening (with dinner) for 1 week. Then take 2 tablets in the morning (with breakfast) and 2 tablets in the evening (with dinner) and stay on that dose.

## 2022-02-23 DIAGNOSIS — D84.9 IMMUNOSUPPRESSED STATUS: ICD-10-CM

## 2022-03-16 ENCOUNTER — PATIENT MESSAGE (OUTPATIENT)
Dept: OTHER | Facility: OTHER | Age: 72
End: 2022-03-16
Payer: MEDICARE

## 2022-03-22 ENCOUNTER — HOSPITAL ENCOUNTER (OUTPATIENT)
Dept: RADIOLOGY | Facility: CLINIC | Age: 72
Discharge: HOME | End: 2022-03-22
Attending: INTERNAL MEDICINE
Payer: MEDICARE

## 2022-03-22 DIAGNOSIS — Z78.0 ASYMPTOMATIC MENOPAUSAL STATE: ICD-10-CM

## 2022-03-22 PROCEDURE — 77080 DXA BONE DENSITY AXIAL: CPT | Mod: TC,PO

## 2022-03-22 PROCEDURE — 77080 DEXA BONE DENSITY SPINE HIP: ICD-10-PCS | Mod: 26,,, | Performed by: INTERNAL MEDICINE

## 2022-03-22 PROCEDURE — 77080 DXA BONE DENSITY AXIAL: CPT | Mod: 26,,, | Performed by: INTERNAL MEDICINE

## 2022-03-30 ENCOUNTER — PATIENT MESSAGE (OUTPATIENT)
Dept: OTHER | Facility: OTHER | Age: 72
End: 2022-03-30
Payer: MEDICARE

## 2022-03-30 ENCOUNTER — PATIENT MESSAGE (OUTPATIENT)
Dept: FAMILY MEDICINE | Facility: CLINIC | Age: 72
End: 2022-03-30
Payer: MEDICARE

## 2022-03-31 ENCOUNTER — PATIENT MESSAGE (OUTPATIENT)
Dept: ADMINISTRATIVE | Facility: OTHER | Age: 72
End: 2022-03-31
Payer: MEDICARE

## 2022-03-31 ENCOUNTER — PATIENT MESSAGE (OUTPATIENT)
Dept: FAMILY MEDICINE | Facility: CLINIC | Age: 72
End: 2022-03-31
Payer: MEDICARE

## 2022-04-01 PROBLEM — M85.89 OSTEOPENIA OF MULTIPLE SITES: Status: ACTIVE | Noted: 2022-04-01

## 2022-04-01 NOTE — PROGRESS NOTES
03/31/2022 DEXA L-spine 1.2, total hip-1.2, femoral neck-1.5.  FRAX score 1.2/9.1%.  Osteopenia.  Compared to previous, decrease in BMD at lumbar spine and total hip.  Results to patient via my chart.  Calcium vitamin-D supplement.  Repeat 2-3 years

## 2022-04-11 ENCOUNTER — PATIENT MESSAGE (OUTPATIENT)
Dept: ADMINISTRATIVE | Facility: OTHER | Age: 72
End: 2022-04-11
Payer: MEDICARE

## 2022-04-28 ENCOUNTER — TELEPHONE (OUTPATIENT)
Dept: FAMILY MEDICINE | Facility: CLINIC | Age: 72
End: 2022-04-28
Payer: MEDICARE

## 2022-04-28 NOTE — TELEPHONE ENCOUNTER
Left a message with the Patient's  for the Patient to call back to be scheduled for an EAWV appointment.  Sent a message thru [facility].

## 2022-05-11 ENCOUNTER — PES CALL (OUTPATIENT)
Dept: ADMINISTRATIVE | Facility: CLINIC | Age: 72
End: 2022-05-11
Payer: MEDICARE

## 2022-05-16 ENCOUNTER — TELEPHONE (OUTPATIENT)
Dept: ADMINISTRATIVE | Facility: CLINIC | Age: 72
End: 2022-05-16
Payer: MEDICARE

## 2022-05-18 ENCOUNTER — OFFICE VISIT (OUTPATIENT)
Dept: INTERNAL MEDICINE | Facility: CLINIC | Age: 72
End: 2022-05-18
Payer: MEDICARE

## 2022-05-18 ENCOUNTER — TELEPHONE (OUTPATIENT)
Dept: ADMINISTRATIVE | Facility: CLINIC | Age: 72
End: 2022-05-18
Payer: MEDICARE

## 2022-05-18 VITALS — BODY MASS INDEX: 34.82 KG/M2 | HEIGHT: 65 IN | WEIGHT: 209 LBS

## 2022-05-18 DIAGNOSIS — M54.16 LEFT LUMBAR RADICULOPATHY: ICD-10-CM

## 2022-05-18 DIAGNOSIS — R26.9 ABNORMALITY OF GAIT AND MOBILITY: ICD-10-CM

## 2022-05-18 DIAGNOSIS — G89.29 CHRONIC MIDLINE LOW BACK PAIN WITHOUT SCIATICA: ICD-10-CM

## 2022-05-18 DIAGNOSIS — M17.11 ARTHRITIS OF KNEE, RIGHT: ICD-10-CM

## 2022-05-18 DIAGNOSIS — Z00.00 ENCOUNTER FOR PREVENTIVE HEALTH EXAMINATION: Primary | ICD-10-CM

## 2022-05-18 DIAGNOSIS — Z12.31 ENCOUNTER FOR SCREENING MAMMOGRAM FOR MALIGNANT NEOPLASM OF BREAST: ICD-10-CM

## 2022-05-18 DIAGNOSIS — Z85.110 HISTORY OF MALIGNANT CARCINOID TUMOR OF BRONCHUS AND LUNG: ICD-10-CM

## 2022-05-18 DIAGNOSIS — E06.3 HYPOTHYROIDISM DUE TO HASHIMOTO'S THYROIDITIS: ICD-10-CM

## 2022-05-18 DIAGNOSIS — E11.42 TYPE 2 DIABETES MELLITUS WITH DIABETIC POLYNEUROPATHY, WITHOUT LONG-TERM CURRENT USE OF INSULIN: ICD-10-CM

## 2022-05-18 DIAGNOSIS — E03.8 HYPOTHYROIDISM DUE TO HASHIMOTO'S THYROIDITIS: ICD-10-CM

## 2022-05-18 DIAGNOSIS — M54.50 CHRONIC MIDLINE LOW BACK PAIN WITHOUT SCIATICA: ICD-10-CM

## 2022-05-18 DIAGNOSIS — K76.0 FATTY LIVER: ICD-10-CM

## 2022-05-18 DIAGNOSIS — M85.89 OSTEOPENIA OF MULTIPLE SITES: ICD-10-CM

## 2022-05-18 DIAGNOSIS — E66.01 SEVERE OBESITY (BMI 35.0-39.9) WITH COMORBIDITY: ICD-10-CM

## 2022-05-18 DIAGNOSIS — I50.32 CHRONIC HEART FAILURE WITH PRESERVED EJECTION FRACTION: ICD-10-CM

## 2022-05-18 DIAGNOSIS — E78.5 DYSLIPIDEMIA: ICD-10-CM

## 2022-05-18 PROCEDURE — G0439 PR MEDICARE ANNUAL WELLNESS SUBSEQUENT VISIT: ICD-10-PCS | Mod: 95,,, | Performed by: NURSE PRACTITIONER

## 2022-05-18 PROCEDURE — G0439 PPPS, SUBSEQ VISIT: HCPCS | Mod: 95,,, | Performed by: NURSE PRACTITIONER

## 2022-05-18 RX ORDER — SACUBITRIL AND VALSARTAN 24; 26 MG/1; MG/1
1 TABLET, FILM COATED ORAL 2 TIMES DAILY
COMMUNITY
Start: 2022-03-20 | End: 2023-10-20

## 2022-05-18 RX ORDER — ASPIRIN 81 MG/1
TABLET ORAL
COMMUNITY
Start: 2017-05-16

## 2022-05-18 RX ORDER — METOPROLOL SUCCINATE 25 MG/1
25 TABLET, EXTENDED RELEASE ORAL DAILY
COMMUNITY
Start: 2022-03-26 | End: 2022-09-06 | Stop reason: SDUPTHER

## 2022-05-18 NOTE — Clinical Note
Medicare AWV completed. Mammogram ordered, patient instructed to schedule after 6/26/2022. Eligible for additional covid booster if desired.  --Alessia

## 2022-05-18 NOTE — PROGRESS NOTES
"The patient location is: [place]  The chief complaint leading to consultation is: HRA    Visit type: audiovisual    Face to Face time with patient: 25  35 minutes of total time spent on the encounter, which includes face to face time and non-face to face time preparing to see the patient (eg, review of tests), Obtaining and/or reviewing separately obtained history, Documenting clinical information in the electronic or other health record, Independently interpreting results (not separately reported) and communicating results to the patient/family/caregiver, or Care coordination (not separately reported).         Each patient to whom he or she provides medical services by telemedicine is:  (1) informed of the relationship between the physician and patient and the respective role of any other health care provider with respect to management of the patient; and (2) notified that he or she may decline to receive medical services by telemedicine and may withdraw from such care at any time.    Notes:       Alvina Fisher presented for a  Medicare AWV and comprehensive Health Risk Assessment today. The following components were reviewed and updated:    · Medical history  · Family History  · Social history  · Allergies and Current Medications  · Health Risk Assessment  · Health Maintenance  · Care Team         ** See Completed Assessments for Annual Wellness Visit within the encounter summary.**         The following assessments were completed:  · Living Situation  · CAGE  · Depression Screening  · Fall Risk Assessment (MACH 10)  · Hearing Assessment(HHI)  · Cognitive Function Screening  · Nutrition Screening  · ADL Screening  · PAQ Screening    Vital signs provided by patient. Pain score 4/10.  Vitals:    05/18/22 0857   Weight: 94.8 kg (209 lb)   Height: 5' 5" (1.651 m)     Body mass index is 34.78 kg/m².     Physical Exam  Constitutional:       General: She is not in acute distress.     Appearance: Normal appearance. She " is not ill-appearing.   Pulmonary:      Effort: Pulmonary effort is normal. No respiratory distress.   Neurological:      Mental Status: She is alert. Mental status is at baseline.   Psychiatric:         Mood and Affect: Mood normal.         Behavior: Behavior normal.         Thought Content: Thought content normal.         Judgment: Judgment normal.     Physical exam limited by virtual visit.          Diagnoses and health risks identified today and associated recommendations/orders:    1. Encounter for preventive health examination  Assessments completed.    recommendations reviewed. Mammogram ordered, patient instructed to schedule after 6/26/2022. Eligible for additional covid booster if desired.  F/u with PCP as instructed.    2. Chronic heart failure with preserved ejection fraction  Chronic, stable on current regimen. Followed by outside cardiology.    3. Severe obesity (BMI 35.0-39.9) with comorbidity  Chronic, stable on current regimen. Followed by PCP.    4. Type 2 diabetes mellitus with diabetic polyneuropathy, without long-term current use of insulin  Chronic, stable on current regimen. Followed by PCP.    5. Dyslipidemia  Chronic, stable on current regimen. Followed by outside cardiology.    6. Hypothyroidism due to Hashimoto's thyroiditis  Chronic, stable on current regimen. Followed by PCP.    7. Osteopenia of multiple sites 3/2022; repeat 2024  Chronic, stable on current regimen. Followed by PCP.    8. Left lumbar radiculopathy from L sciatica, remote laminectomy but residual weakness/numbness L leg  Chronic, stable on current regimen. Followed by PCP.    9. Chronic midline low back pain without sciatica hx of laminectomy L5S1; flexeril PRN  Chronic, stable on current regimen. Followed by PCP.    10. Arthritis of knee, right  Chronic, stable on current regimen. Followed by PCP.    11. Abnormality of gait and mobility  Chronic, stable. No recent falls. Does not use assistive devices. Followed by  PCP.    12. Fatty liver  Chronic, stable on current regimen. Followed by PCP.    13. History of malignant carcinoid tumor of bronchus and lung  Chronic, stable on current regimen. Followed by outside heme/onc.    14. Encounter for screening mammogram for malignant neoplasm of breast  Mammogram ordered. Patient instructed to schedule after 6/26/2022.  - Mammo Digital Screening Bilat w/ [facility]; Future      Provided Alvina with a 5-10 year written screening schedule and personal prevention plan. Recommendations were developed using the USPSTF age appropriate recommendations. Education, counseling, and referrals were provided as needed. After Visit Summary printed and given to patient which includes a list of additional screenings\tests needed.    Follow up in about 1 year (around 5/18/2023) for Medicare AWV and with PCP as scheduled.       Alessia Rosa NP     I offered to discuss advanced care planning, including how to pick a person who would make decisions for you if you were unable to make them for yourself, called a health care power of , and what kind of decisions you might make such as use of life sustaining treatments such as ventilators and tube feeding when faced with a life limiting illness recorded on a living will that they will need to know. (How you want to be cared for as you near the end of your natural life)     X Patient is interested in learning more about how to make advanced directives.  I provided them paperwork and offered to discuss this with them.

## 2022-05-18 NOTE — PATIENT INSTRUCTIONS
1. Follow up with Dr. Clement Phillips MD as scheduled.    2. Schedule mammogram AFTER June 26, 2022.    3. Eligible for an additional covid booster if desired.    Counseling and Referral of Other Preventative  (Italic type indicates deductible and co-insurance are waived)    Patient Name: Alvina Fisher  Today's Date: 5/18/2022    Health Maintenance       Date Due Completion Date    COVID-19 Vaccine (4 - Booster for Pfizer series) 04/02/2022 12/2/2021    Mammogram 06/25/2022 6/25/2021    Hemoglobin A1c 07/26/2022 1/26/2022    Eye Exam 10/06/2022 10/6/2021    Diabetes Urine Screening 01/26/2023 1/26/2022    Lipid Panel 01/26/2023 1/26/2022    Foot Exam 02/17/2023 2/17/2022    Low Dose Statin 05/18/2023 5/18/2022    DEXA Scan 03/22/2024 3/22/2022    Colorectal Cancer Screening 08/31/2027 8/31/2017    TETANUS VACCINE 08/16/2031 8/16/2021        Orders Placed This Encounter   Procedures    Mammo Digital Screening Bilat w/ [facility]     The following information is provided to all patients.  This information is to help you find resources for any of the problems found today that may be affecting your health:                Living healthy guide: www.[facility].[place].gov      Understanding Diabetes: www.diabetes.org      Eating healthy: www.cdc.gov/healthyweight      CDC home safety checklist: www.cdc.gov/steadi/patient.html      Agency on Aging: www.goea.[place].gov      Alcoholics anonymous (AA): www.aa.org      Physical Activity: www.jomar.nih.gov/xd1zpcv      Tobacco use: www.quitwithusla.org

## 2022-05-23 ENCOUNTER — PATIENT MESSAGE (OUTPATIENT)
Dept: FAMILY MEDICINE | Facility: CLINIC | Age: 72
End: 2022-05-23
Payer: MEDICARE

## 2022-05-23 DIAGNOSIS — R11.0 NAUSEA: Primary | ICD-10-CM

## 2022-05-23 RX ORDER — ONDANSETRON 4 MG/1
4 TABLET, ORALLY DISINTEGRATING ORAL EVERY 6 HOURS PRN
Qty: 6 TABLET | Refills: 0 | Status: SHIPPED | OUTPATIENT
Start: 2022-05-23 | End: 2022-09-06

## 2022-06-06 ENCOUNTER — PATIENT MESSAGE (OUTPATIENT)
Dept: OTHER | Facility: OTHER | Age: 72
End: 2022-06-06
Payer: MEDICARE

## 2022-06-28 ENCOUNTER — HOSPITAL ENCOUNTER (OUTPATIENT)
Dept: RADIOLOGY | Facility: HOSPITAL | Age: 72
Discharge: HOME | End: 2022-06-28
Attending: NURSE PRACTITIONER
Payer: MEDICARE

## 2022-06-28 DIAGNOSIS — Z12.31 ENCOUNTER FOR SCREENING MAMMOGRAM FOR MALIGNANT NEOPLASM OF BREAST: ICD-10-CM

## 2022-06-28 PROCEDURE — 77067 SCR MAMMO BI INCL CAD: CPT | Mod: TC,PO

## 2022-06-28 PROCEDURE — 77063 MAMMO DIGITAL SCREENING BILAT WITH TOMO: ICD-10-PCS | Mod: 26,,, | Performed by: RADIOLOGY

## 2022-06-28 PROCEDURE — 77067 MAMMO DIGITAL SCREENING BILAT WITH TOMO: ICD-10-PCS | Mod: 26,,, | Performed by: RADIOLOGY

## 2022-06-28 PROCEDURE — 77063 BREAST TOMOSYNTHESIS BI: CPT | Mod: 26,,, | Performed by: RADIOLOGY

## 2022-06-28 PROCEDURE — 77067 SCR MAMMO BI INCL CAD: CPT | Mod: 26,,, | Performed by: RADIOLOGY

## 2022-07-21 ENCOUNTER — PATIENT MESSAGE (OUTPATIENT)
Dept: OTHER | Facility: OTHER | Age: 72
End: 2022-07-21
Payer: MEDICARE

## 2022-08-17 ENCOUNTER — LAB VISIT (OUTPATIENT)
Dept: LAB | Facility: HOSPITAL | Age: 72
End: 2022-08-17
Attending: INTERNAL MEDICINE
Payer: MEDICARE

## 2022-08-17 DIAGNOSIS — E06.3 HYPOTHYROIDISM DUE TO HASHIMOTO'S THYROIDITIS: ICD-10-CM

## 2022-08-17 DIAGNOSIS — E11.42 TYPE 2 DIABETES MELLITUS WITH DIABETIC POLYNEUROPATHY, WITHOUT LONG-TERM CURRENT USE OF INSULIN: ICD-10-CM

## 2022-08-17 DIAGNOSIS — E03.8 HYPOTHYROIDISM DUE TO HASHIMOTO'S THYROIDITIS: ICD-10-CM

## 2022-08-17 LAB
ALBUMIN SERPL BCP-MCNC: 4.1 G/DL (ref 3.5–5.2)
ALP SERPL-CCNC: 62 U/L (ref 55–135)
ALT SERPL W/O P-5'-P-CCNC: 35 U/L (ref 10–44)
ANION GAP SERPL CALC-SCNC: 11 MMOL/L (ref 8–16)
AST SERPL-CCNC: 29 U/L (ref 10–40)
BASOPHILS # BLD AUTO: 0.09 K/UL (ref 0–0.2)
BASOPHILS NFR BLD: 1.2 % (ref 0–1.9)
BILIRUB SERPL-MCNC: 0.8 MG/DL (ref 0.1–1)
BUN SERPL-MCNC: 15 MG/DL (ref 8–23)
CALCIUM SERPL-MCNC: 9.8 MG/DL (ref 8.7–10.5)
CHLORIDE SERPL-SCNC: 105 MMOL/L (ref 95–110)
CO2 SERPL-SCNC: 25 MMOL/L (ref 23–29)
CREAT SERPL-MCNC: 0.8 MG/DL (ref 0.5–1.4)
DIFFERENTIAL METHOD: ABNORMAL
EOSINOPHIL # BLD AUTO: 0.5 K/UL (ref 0–0.5)
EOSINOPHIL NFR BLD: 6.3 % (ref 0–8)
ERYTHROCYTE [DISTWIDTH] IN BLOOD BY AUTOMATED COUNT: 13.3 % (ref 11.5–14.5)
EST. GFR  (NO RACE VARIABLE): >60 ML/MIN/1.73 M^2
ESTIMATED AVG GLUCOSE: 134 MG/DL (ref 68–131)
GLUCOSE SERPL-MCNC: 134 MG/DL (ref 70–110)
HBA1C MFR BLD: 6.3 % (ref 4–5.6)
HCT VFR BLD AUTO: 40.9 % (ref 37–48.5)
HGB BLD-MCNC: 13.8 G/DL (ref 12–16)
IMM GRANULOCYTES # BLD AUTO: 0.01 K/UL (ref 0–0.04)
IMM GRANULOCYTES NFR BLD AUTO: 0.1 % (ref 0–0.5)
LYMPHOCYTES # BLD AUTO: 3.7 K/UL (ref 1–4.8)
LYMPHOCYTES NFR BLD: 49.8 % (ref 18–48)
MCH RBC QN AUTO: 30.5 PG (ref 27–31)
MCHC RBC AUTO-ENTMCNC: 33.7 G/DL (ref 32–36)
MCV RBC AUTO: 91 FL (ref 82–98)
MONOCYTES # BLD AUTO: 0.7 K/UL (ref 0.3–1)
MONOCYTES NFR BLD: 9 % (ref 4–15)
NEUTROPHILS # BLD AUTO: 2.5 K/UL (ref 1.8–7.7)
NEUTROPHILS NFR BLD: 33.6 % (ref 38–73)
NRBC BLD-RTO: 0 /100 WBC
PLATELET # BLD AUTO: 203 K/UL (ref 150–450)
PMV BLD AUTO: 11.6 FL (ref 9.2–12.9)
POTASSIUM SERPL-SCNC: 4.2 MMOL/L (ref 3.5–5.1)
PROT SERPL-MCNC: 7.6 G/DL (ref 6–8.4)
RBC # BLD AUTO: 4.52 M/UL (ref 4–5.4)
SODIUM SERPL-SCNC: 141 MMOL/L (ref 136–145)
TSH SERPL DL<=0.005 MIU/L-ACNC: 0.59 UIU/ML (ref 0.4–4)
WBC # BLD AUTO: 7.35 K/UL (ref 3.9–12.7)

## 2022-08-17 PROCEDURE — 80053 COMPREHEN METABOLIC PANEL: CPT | Performed by: INTERNAL MEDICINE

## 2022-08-17 PROCEDURE — 83036 HEMOGLOBIN GLYCOSYLATED A1C: CPT | Performed by: INTERNAL MEDICINE

## 2022-08-17 PROCEDURE — 84443 ASSAY THYROID STIM HORMONE: CPT | Performed by: INTERNAL MEDICINE

## 2022-08-17 PROCEDURE — 85025 COMPLETE CBC W/AUTO DIFF WBC: CPT | Performed by: INTERNAL MEDICINE

## 2022-08-17 PROCEDURE — 36415 COLL VENOUS BLD VENIPUNCTURE: CPT | Mod: PO | Performed by: INTERNAL MEDICINE

## 2022-09-05 NOTE — PROGRESS NOTES
This note was created by combination of typed  and M-Modal dictation.  Transcription errors may be present.  If there are any questions, please contact me.    Assessment and Plan:   Type 2 diabetes mellitus with diabetic polyneuropathy, without long-term current use of insulin  -pre visit labs A1c improved.  On metformin.  Working on diet and physical activity.  No changes.  Check future labs.  History of Trulicity which she did find very effective but was expensive.  She wonders if it could have been a non formulary issue.  She will ask insurance if there may be a preferred prescription.  Gave her the names of Trulicity, Rybelsus, Ozempic, and Bydureon.  -     Comprehensive Metabolic Panel; Future; Expected date: 03/05/2023  -     Lipid Panel; Future; Expected date: 03/05/2023  -     Hemoglobin A1C; Future; Expected date: 03/05/2023  -     Microalbumin/Creatinine Ratio, Urine; Future; Expected date: 03/05/2023  -     CBC Auto Differential; Future; Expected date: 03/05/2023    Chronic heart failure with preserved ejection fraction  Diastolic dysfunction  -on Entresto and Toprol XL.  Followed by outside cardiology.  -     metoprolol succinate (TOPROL-XL) 25 MG 24 hr tablet; Take 1 tablet (25 mg total) by mouth once daily.  Dispense: 90 tablet; Refill: 3    Dyslipidemia  -previous lipid profile good on statin no changes.  -     rosuvastatin (CRESTOR) 10 MG tablet; Take 1 tablet (10 mg total) by mouth nightly.  Dispense: 90 tablet; Refill: 3    Carcinoid tumor of lung, unspecified whether malignant  -followed by Hematology-Oncology.  Most recent PET-CT no evidence of recurrence.    Hypothyroidism due to Hashimoto's thyroiditis  -pre visit labs TSH good on current dose levothyroxine.  check future labs  -     TSH; Future; Expected date: 03/06/2023    Chest wall pain following surgery  -she feels like the pain preceded her chest wall surgery.  Gabapentin does help.  She takes it only at night.  We talked about  referral to pain management for evaluation and possible consideration of injection and she declines at this time.  -     gabapentin (NEURONTIN) 300 MG capsule; Take 1 capsule (300 mg total) by mouth every evening. Start with once nightly  Dispense: 90 capsule; Refill: 3    Palpitations  -on Toprol XL.  Stable.  Reduced sensation of palpitations.  Refilled  -     metoprolol succinate (TOPROL-XL) 25 MG 24 hr tablet; Take 1 tablet (25 mg total) by mouth once daily.  Dispense: 90 tablet; Refill: 3    Vitamin D deficiency  -check future labs on vitamin-D over-the-counter 1000 IU daily.  She notes fatigue and wonders if it could be related to vitamin-D deficiency.  I discussed with her that I suspect that it is probably due to alternate factors but will check a vitamin-D on her.  Using her CPAP.        Medications Discontinued During This Encounter   Medication Reason    ondansetron (ZOFRAN-ODT) 4 MG TbDL     albuterol (PROAIR HFA) 90 mcg/actuation inhaler     gabapentin (NEURONTIN) 300 MG capsule Reorder    rosuvastatin (CRESTOR) 10 MG tablet Reorder    metoprolol succinate (TOPROL-XL) 25 MG 24 hr tablet Reorder       meds sent this encounter:  Medications Ordered This Encounter   Medications    gabapentin (NEURONTIN) 300 MG capsule     Sig: Take 1 capsule (300 mg total) by mouth every evening. Start with once nightly     Dispense:  90 capsule     Refill:  3    metoprolol succinate (TOPROL-XL) 25 MG 24 hr tablet     Sig: Take 1 tablet (25 mg total) by mouth once daily.     Dispense:  90 tablet     Refill:  3     .    rosuvastatin (CRESTOR) 10 MG tablet     Sig: Take 1 tablet (10 mg total) by mouth nightly.     Dispense:  90 tablet     Refill:  3       Follow Up: No follow-ups on file.  Follow-up 6 months with labs    Subjective:     Chief Complaint   Patient presents with    Follow-up     6 months        HPI  Alvina is a 71 y.o. female, last appointment with this clinic was Visit date not found.  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 Patient has had a hysterectomy.    Last visit with me 2022 for PEx  DM2 glc going up. A1c good. Work on TLC. Increase metformin. On natalia  Lipid/statin  Allergic rhinitis, start astelin  Hypothyroid on LT  Fatty liver  Carcinoid tumor followed by outside heme/onc. 3/23/22 PET CT 1.   Stable postsurgical changes in the left lung without evidence of local recurrence or distant metastatic disease identified.  HFpEF reportedly elevated end-diastolic pressure on left heart catheterization.  Was started on Entresto but unable to afford it.  She wanted 2nd opinion about her diagnosis so she came to [facility] for evaluation, concurred with diagnosis.  Because of her fatigue I believe Cardiology stopped her metoprolol.  Increased her quinapril to compensate.  Started her on Lasix.  She had been seeing Dr. Arredondo for a very long time and wants to return back to him.  She is going to schedule follow-up with him and see if her insurance might cover Entresto now that it is a new year. Subsequently restarted entresto. Saw cards 2022. Stable. Palpitations, 2 week BG monitor. Saw EP. Restarted with metoprolol again. Sx improved.  Monitor with sinus tach  Adjustment disorder, Valium p.r.n.  Gabapentin for chest wall pain  SAM not using CPAP because of recall.  Saw pulm early .  Had restarted CPAP with filter.    Pre visit labs   CMP WNL  CBC WNL  A1c 6.3 from 6.9  TSH WNL    Takes the gabapentin for her chest wall pain.  She thinks it preceded the surgery.  But it feels like it is in her chest wall but also stemming from her thoracic  back as well.  She reports that she had scoliosis seen on most recent imaging wonders if it could be due to that.  Shows dextro convex scoliosis.  Discussed that this is not definitive as a source.  We also talked about having see pain management for consideration of injection.  After consideration she declines at this time.    Reviewed her pre visit labs.  A1c is better.  She has been taking metformin 1000 b.i.d..  Initially had some nausea which subsided.  She is tolerating it fine.  Working on diet and physical activity including increasing her walking.  She does experience some pain in her left knee.  She has had surgery on her right knee which was pretty successful and is thinking that of for knee pain persists she may go back to see Orthopedics.  Discussed possibly using Voltaren gel.    Blood pressure today is good.  Lasix taking daily  Taking entresto and toprol xl    And taking statin    No recent use of valium.     Vit D taking 1000. Wondcers if she's low b/c she is complaining of sensation of fatigue.  She does use CPAP.  She got a new machine, finally.  It took over a year to get it.      Patient Care Team:  Clement Phillips MD as PCP - General (Internal Medicine)  Yuriy Amor MD as Consulting Physician (Internal Medicine)  Radha Calix MD (Pulmonary Disease)  Edmund Cantrell MD as Consulting Physician (Cardiothoracic Surgery)  Wilbert Arredondo MD (Cardiology)  Chris Muro MD as Consulting Physician (Ophthalmology)  [facility] (Gastroenterology)  Basilia Medley MA as Care Coordinator  David Bansal MD (Ophthalmology)  Hayley Pink MD (Hematology and Oncology)  Bonnie Francis as Digital Medicine Health   Clement Phillips MD as Diabetes Digital Medicine Responsible Provider (Internal Medicine)  Sofie Carrera, Gerardo as Diabetes Digital Medicine Clinician (Pharmacist)  Medicare [employer] Program as Diabetes Digital Medicine  Contract    Patient Active Problem List    Diagnosis Date Noted    Chronic heart failure with preserved ejection fraction 05/18/2022    Osteopenia of multiple sites 3/2022; repeat 2024 04/01/2022 03/31/2022 DEXA L-spine 1.2, total hip-1.2, femoral neck-1.5.  FRAX score 1.2/9.1%.  Osteopenia.  Compared to previous, decrease in BMD at lumbar spine and total hip.      Fatty liver 02/16/2022 09/08/2021 right upper quadrant ultrasound  Previous cholecystectomy. No biliary ductal dilatation is seen.  Diffusely increased parenchymal echogenicity of the liver most consistent with steatosis. There is no sonographic evidence of cirrhosis and no focal liver lesions are seen.      Severe obesity (BMI 35.0-39.9) with comorbidity 02/16/2022    Dyslipidemia 09/21/2020     10/26/2021 TTE LV normal size and thickness.  Normal LV systolic function LVEF 60-65%.  Normal diastolic function. RV normal size and systolic function.  10/26/2021 nuclear medicine stress test abnormal moderate size moderate intensity partially reversible defect mid distal anterior wall.  11/15/2021 left heart catheterization no evidence of angiographically significant coronary artery disease.  By report, elevated EDP.      Hyperplastic polyp of sigmoid colon 08/19/2020 8/31/2017 Colonoscopy 3 mm rectal polyp.  Large internal hemorrhoids.  Hyperplastic polyp      Chronic superficial gastritis without bleeding on EGD 2017 08/19/2020 8/31/2017 EGD normal esophagus.  Patchy mild erythema of antrum.  Biopsied.  Normal duodenum.  Biopsy mild chronic gastritis H pylori negative      Hypothyroidism due to Hashimoto's thyroiditis 07/08/2020    Type 2 diabetes mellitus with diabetic polyneuropathy, without long-term current use of insulin 06/25/2020    Hepatitis C antibody test positive but VL negative, either exposed/immune or false positive initial screening 02/17/2020    Carcinoid tumor of lung 02/07/2020     History of left pulmonary nodule.  Seen by  outside CT surgery.  Had previously been sent to intervention Radiology but was unable to biopsy the lesion.  Increased in size from initial 0.7-1.3 cm over 3 years.    9/12/19 CT-guided biopsy:  LUNG, LEFT LOWER LOBE, CORE BIOPSY:   - SMALL FRAGMENTS OF BENIGN PULMONARY ALVEOLAR PARENCHYMA.   - NO EVIDENCE OF NEOPLASM OR GRANULOMATOUS DISEASE.   6/11/20 lung bx  LUNG, NEEDLE BIOPSY, CLINICALLY LEFT LOWER LOBE:  --ATYPICAL ADENOMATOUS HYPERPLASIA ( 0.5 MM LESION, AS MEASURED ON   SLIDE).   She underwent on July 31, 2020 a left lower lobe wedge resection of the mass with completion robotic lobectomy and mediastinal lymph node dissection.  The final pathology report showed the presence of low-grade carcinoid tumor measuring 1.20 cm that was low-grade with a Ki-67 of 3%. The margins were negative. The lymph node at the level 7, 8, 10 and 11 came negative for metastatic disease. There was no direct invasion of the adjacent structure no lymphovascular invasion. Her final stage was a pT1 pN0 pMX.  9/10/20 PET/CT neuroendocrine study:  1.   Postoperative changes of recent left lower lobe pulmonary nodule wedge resection with mild metabolic activity along the lateral margin of the suture line likely being postoperative in nature. Continued attention at this site is warranted on future imaging.  2.   No evidence of metastatic disease identified.  3/23/22 PET CT 1.   Stable postsurgical changes in the left lung without evidence of local recurrence or distant metastatic disease identified.      Family history of heart disease Dr. Arredondo 02/07/2020     10/03/2013 nuclear medicine stress test no significant EKG changes.  No chest pain. Normal perfusion scan.  Normal LV systolic function  10/03/2013 TTE normal LV size wall thickness and systolic function.  05/19/17 Nuclear stress test: Five minutes standard Dario protocol. 1 mm horizontal ST depression. Substernal chest heaviness and tightness, resolved in recovery. Perfusion scan  normal.  05/17 Echocardiogram shows normal cardiac function. EF normal. Greater than 50%. Grade I diastolic function.  02/02/18 Coronary calcium score was 46 with 41 in the LAD, circumflex was 5. Her liver measured 41 Hounsfield units. The spleen could not be visualized. These findings are consistent with fatty liver. The textural appearance to me of the liver was that of fatty liver. She had normal origin of the coronary arteries, normal configuration of the pulmonary veins, left atrium 35 mm, calcified plaque was also seen in the aortic annulus. Per Radiology, she had a 1 cm nodule in the left lower lobe. Radiology recommended followup in six months with CT.  6/22/20 nu med stress test  Nuclear scans show a small area of possible anterior wall ischemia. This could also be secondary to breast artefact. Clinical      correlation suggested.     Gated scan is wnl. EF 80%     Ekg portion of test is negative         Other specified glaucoma 02/07/2020    Chronic midline low back pain without sciatica hx of laminectomy L5S1; flexeril PRN 02/07/2020    Left lumbar radiculopathy from L sciatica, remote laminectomy but residual weakness/numbness L leg 02/07/2020    Arthritis of knee, right 05/03/2019    Complex tear of medial meniscus of right knee as current injury 05/03/2019    Mixed incontinence urge and stress (male)(female) 03/05/2013    Cystocele 03/05/2013    Rectocele 03/05/2013    Chronic constipation 03/05/2013    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       PAST MEDICAL PROBLEMS, PAST SURGICAL HISTORY: please see relevant portions of the electronic medical record    ALLERGIES AND MEDICATIONS: updated and reviewed.  Review of patient's allergies indicates:   Allergen Reactions    Oxycodone-acetaminophen Itching and Nausea Only     NOT ALLERGIC TO ACETAMINOPHEN, HAS TAKEN IT     Codeine Itching       Medication List with Changes/Refills   Current Medications    ACETAMINOPHEN (TYLENOL) 500 MG TABLET    Take 500 mg by  "mouth every 6 (six) hours as needed for Pain.    ALBUTEROL (PROAIR HFA) 90 MCG/ACTUATION INHALER    Inhale 2 puffs into the lungs every 6 (six) hours as needed for Wheezing or Shortness of Breath.    ASPIRIN (ECOTRIN) 81 MG EC TABLET        AZELASTINE (ASTELIN) 137 MCG (0.1 %) NASAL SPRAY    1 spray (137 mcg total) by Nasal route 2 (two) times daily.    BRIMONIDINE 0.2% (ALPHAGAN) 0.2 % DROP        COENZYME Q10 200 MG CAPSULE    Take 200 mg by mouth.    DIAZEPAM (VALIUM) 5 MG TABLET    Take 1 tablet (5 mg total) by mouth nightly as needed for Anxiety.    DORZOLAMIDE-TIMOLOL 2-0.5% (COSOPT) 22.3-6.8 MG/ML OPHTHALMIC SOLUTION        ENTRESTO 24-26 MG PER TABLET    Take 1 tablet by mouth 2 (two) times daily.    FUROSEMIDE (LASIX) 20 MG TABLET    Take 1 tablet (20 mg total) by mouth once daily.    GABAPENTIN (NEURONTIN) 300 MG CAPSULE    Take 1 capsule (300 mg total) by mouth 3 (three) times daily. Start with once nightly    LEVOTHYROXINE (SYNTHROID) 100 MCG TABLET    Take 1 tablet (100 mcg total) by mouth before breakfast.    METFORMIN (GLUCOPHAGE-XR) 500 MG ER 24HR TABLET    Take 2 tablets (1,000 mg total) by mouth 2 (two) times daily with meals.    METOPROLOL SUCCINATE (TOPROL-XL) 25 MG 24 HR TABLET    Take 25 mg by mouth once daily.    ONDANSETRON (ZOFRAN-ODT) 4 MG TBDL    Take 1 tablet (4 mg total) by mouth every 6 (six) hours as needed (nausea).    ROSUVASTATIN (CRESTOR) 10 MG TABLET    Take 10 mg by mouth nightly.    VITAMIN D (VITAMIN D3) 1000 UNITS TAB            Objective:   Physical Exam   Vitals:    09/06/22 1539   BP: (!) 112/58   BP Location: Right arm   Patient Position: Sitting   BP Method: Large (Manual)   Pulse: 81   Temp: 98 °F (36.7 °C)   TempSrc: Oral   SpO2: 97%   Weight: 94.3 kg (208 lb 0.1 oz)   Height: 5' 5" (1.651 m)    Body mass index is 34.61 kg/m².  Weight: 94.3 kg (208 lb 0.1 oz)   Height: 5' 5" (165.1 cm)     Physical Exam  Constitutional:       General: She is not in acute distress.     " Appearance: She is well-developed.   Eyes:      Extraocular Movements: Extraocular movements intact.   Cardiovascular:      Rate and Rhythm: Normal rate and regular rhythm.      Heart sounds: Normal heart sounds. No murmur heard.  Pulmonary:      Effort: Pulmonary effort is normal.      Breath sounds: Normal breath sounds.   Musculoskeletal:         General: Normal range of motion.      Right lower leg: No edema.      Left lower leg: No edema.   Skin:     General: Skin is warm and dry.   Neurological:      Mental Status: She is alert and oriented to person, place, and time.      Coordination: Coordination normal.   Psychiatric:         Behavior: Behavior normal.         Thought Content: Thought content normal.       Component      Latest Ref Rng & Units 8/17/2022 1/26/2022   WBC      3.90 - 12.70 K/uL 7.35 6.65   RBC      4.00 - 5.40 M/uL 4.52 4.79   Hemoglobin      12.0 - 16.0 g/dL 13.8 14.0   Hematocrit      37.0 - 48.5 % 40.9 44.5   MCV      82 - 98 fL 91 93   MCH      27.0 - 31.0 pg 30.5 29.2   MCHC      32.0 - 36.0 g/dL 33.7 31.5 (L)   RDW      11.5 - 14.5 % 13.3 12.9   Platelets      150 - 450 K/uL 203 189   MPV      9.2 - 12.9 fL 11.6 11.4   Immature Granulocytes      0.0 - 0.5 % 0.1 0.2   Gran # (ANC)      1.8 - 7.7 K/uL 2.5 2.6   Immature Grans (Abs)      0.00 - 0.04 K/uL 0.01 0.01   Lymph #      1.0 - 4.8 K/uL 3.7 2.9   Mono #      0.3 - 1.0 K/uL 0.7 0.5   Eos #      0.0 - 0.5 K/uL 0.5 0.5   Baso #      0.00 - 0.20 K/uL 0.09 0.09   nRBC      0 /100 WBC 0 0   Gran %      38.0 - 73.0 % 33.6 (L) 39.4   Lymph %      18.0 - 48.0 % 49.8 (H) 43.5   Mono %      4.0 - 15.0 % 9.0 7.7   Eosinophil %      0.0 - 8.0 % 6.3 7.8   Basophil %      0.0 - 1.9 % 1.2 1.4   Differential Method       Automated Automated   Sodium      136 - 145 mmol/L 141 137   Potassium      3.5 - 5.1 mmol/L 4.2 4.4   Chloride      95 - 110 mmol/L 105 104   CO2      23 - 29 mmol/L 25 26   Glucose      70 - 110 mg/dL 134 (H) 166 (H)   BUN      8  - 23 mg/dL 15 12   Creatinine      0.5 - 1.4 mg/dL 0.8 0.8   Calcium      8.7 - 10.5 mg/dL 9.8 9.6   PROTEIN TOTAL      6.0 - 8.4 g/dL 7.6 7.6   Albumin      3.5 - 5.2 g/dL 4.1 4.1   BILIRUBIN TOTAL      0.1 - 1.0 mg/dL 0.8 0.7   Alkaline Phosphatase      55 - 135 U/L 62 81   AST      10 - 40 U/L 29 47 (H)   ALT      10 - 44 U/L 35 55 (H)   Anion Gap      8 - 16 mmol/L 11 7 (L)   eGFR if African American      >60 mL/min/1.73 m:2  >60.0   eGFR if non African American      >60 mL/min/1.73 m:2  >60.0   eGFR      >60 mL/min/1.73 m:2 >60.0    Cholesterol      120 - 199 mg/dL  104 (L)   Triglycerides      30 - 150 mg/dL  126   HDL      40 - 75 mg/dL  40   LDL Cholesterol External      63.0 - 159.0 mg/dL  38.8 (L)   HDL/Cholesterol Ratio      20.0 - 50.0 %  38.5   Total Cholesterol/HDL Ratio      2.0 - 5.0  2.6   Non-HDL Cholesterol      mg/dL  64   Microalbumin, Urine      ug/mL  <5.0   Creatinine, Urine      15.0 - 325.0 mg/dL  13.0 (L)   MICROALB/CREAT RATIO      0.0 - 30.0 ug/mg  Unable to calculate   Hemoglobin A1C External      4.0 - 5.6 % 6.3 (H) 6.9 (H)   Estimated Avg Glucose      68 - 131 mg/dL 134 (H) 151 (H)   TSH      0.400 - 4.000 uIU/mL 0.588

## 2022-09-06 ENCOUNTER — OFFICE VISIT (OUTPATIENT)
Dept: FAMILY MEDICINE | Facility: CLINIC | Age: 72
End: 2022-09-06
Payer: MEDICARE

## 2022-09-06 VITALS
HEIGHT: 65 IN | TEMPERATURE: 98 F | BODY MASS INDEX: 34.66 KG/M2 | WEIGHT: 208 LBS | DIASTOLIC BLOOD PRESSURE: 58 MMHG | OXYGEN SATURATION: 97 % | SYSTOLIC BLOOD PRESSURE: 112 MMHG | HEART RATE: 81 BPM

## 2022-09-06 DIAGNOSIS — R07.89 CHEST WALL PAIN FOLLOWING SURGERY: ICD-10-CM

## 2022-09-06 DIAGNOSIS — E03.8 HYPOTHYROIDISM DUE TO HASHIMOTO'S THYROIDITIS: ICD-10-CM

## 2022-09-06 DIAGNOSIS — G89.18 CHEST WALL PAIN FOLLOWING SURGERY: ICD-10-CM

## 2022-09-06 DIAGNOSIS — E11.42 TYPE 2 DIABETES MELLITUS WITH DIABETIC POLYNEUROPATHY, WITHOUT LONG-TERM CURRENT USE OF INSULIN: Primary | ICD-10-CM

## 2022-09-06 DIAGNOSIS — R00.2 PALPITATIONS: ICD-10-CM

## 2022-09-06 DIAGNOSIS — E55.9 VITAMIN D DEFICIENCY: ICD-10-CM

## 2022-09-06 DIAGNOSIS — E78.5 DYSLIPIDEMIA: ICD-10-CM

## 2022-09-06 DIAGNOSIS — I51.89 DIASTOLIC DYSFUNCTION: ICD-10-CM

## 2022-09-06 DIAGNOSIS — D3A.090 CARCINOID TUMOR OF LUNG, UNSPECIFIED WHETHER MALIGNANT: ICD-10-CM

## 2022-09-06 DIAGNOSIS — E06.3 HYPOTHYROIDISM DUE TO HASHIMOTO'S THYROIDITIS: ICD-10-CM

## 2022-09-06 DIAGNOSIS — I50.32 CHRONIC HEART FAILURE WITH PRESERVED EJECTION FRACTION: ICD-10-CM

## 2022-09-06 PROCEDURE — 99999 PR PBB SHADOW E&M-EST. PATIENT-LVL IV: CPT | Mod: PBBFAC,,, | Performed by: INTERNAL MEDICINE

## 2022-09-06 PROCEDURE — 99214 OFFICE O/P EST MOD 30 MIN: CPT | Mod: PBBFAC,PO | Performed by: INTERNAL MEDICINE

## 2022-09-06 PROCEDURE — 99214 OFFICE O/P EST MOD 30 MIN: CPT | Mod: S$PBB,,, | Performed by: INTERNAL MEDICINE

## 2022-09-06 PROCEDURE — 99214 PR OFFICE/OUTPT VISIT, EST, LEVL IV, 30-39 MIN: ICD-10-PCS | Mod: S$PBB,,, | Performed by: INTERNAL MEDICINE

## 2022-09-06 PROCEDURE — 99999 PR PBB SHADOW E&M-EST. PATIENT-LVL IV: ICD-10-PCS | Mod: PBBFAC,,, | Performed by: INTERNAL MEDICINE

## 2022-09-06 RX ORDER — ROSUVASTATIN CALCIUM 10 MG/1
10 TABLET, COATED ORAL NIGHTLY
Qty: 90 TABLET | Refills: 3 | Status: SHIPPED | OUTPATIENT
Start: 2022-09-06 | End: 2023-02-08 | Stop reason: SDUPTHER

## 2022-09-06 RX ORDER — GABAPENTIN 300 MG/1
300 CAPSULE ORAL NIGHTLY
Qty: 90 CAPSULE | Refills: 3 | Status: SHIPPED | OUTPATIENT
Start: 2022-09-06 | End: 2023-03-03 | Stop reason: SDUPTHER

## 2022-09-06 RX ORDER — METOPROLOL SUCCINATE 25 MG/1
25 TABLET, EXTENDED RELEASE ORAL DAILY
Qty: 90 TABLET | Refills: 3 | Status: SHIPPED | OUTPATIENT
Start: 2022-09-06 | End: 2023-03-03 | Stop reason: SDUPTHER

## 2022-09-06 NOTE — PATIENT INSTRUCTIONS
FOR YOUR KNEE - TRY VOLTAREN GEL    FIND OUT IF FORMULARY FAVORS TRULICITY OR OZEMPIC OR RYBELSUS OR BYDUREON

## 2022-10-27 ENCOUNTER — TELEPHONE (OUTPATIENT)
Dept: FAMILY MEDICINE | Facility: CLINIC | Age: 72
End: 2022-10-27
Payer: MEDICARE

## 2022-10-27 DIAGNOSIS — Z23 NEEDS FLU SHOT: Primary | ICD-10-CM

## 2022-10-29 ENCOUNTER — OFFICE VISIT (OUTPATIENT)
Dept: URGENT CARE | Facility: CLINIC | Age: 72
End: 2022-10-29
Payer: MEDICARE

## 2022-10-29 VITALS
HEIGHT: 65 IN | TEMPERATURE: 100 F | WEIGHT: 208 LBS | SYSTOLIC BLOOD PRESSURE: 125 MMHG | BODY MASS INDEX: 34.66 KG/M2 | OXYGEN SATURATION: 96 % | HEART RATE: 100 BPM | DIASTOLIC BLOOD PRESSURE: 85 MMHG

## 2022-10-29 DIAGNOSIS — R05.9 COUGH, UNSPECIFIED TYPE: ICD-10-CM

## 2022-10-29 DIAGNOSIS — R68.89 FLU-LIKE SYMPTOMS: Primary | ICD-10-CM

## 2022-10-29 DIAGNOSIS — R09.3 ABNORMAL SPUTUM COLOR: ICD-10-CM

## 2022-10-29 DIAGNOSIS — R09.81 NASAL CONGESTION: ICD-10-CM

## 2022-10-29 DIAGNOSIS — R06.2 WHEEZE: ICD-10-CM

## 2022-10-29 DIAGNOSIS — J40 BRONCHITIS: ICD-10-CM

## 2022-10-29 DIAGNOSIS — R50.9 FEVER, UNSPECIFIED FEVER CAUSE: ICD-10-CM

## 2022-10-29 LAB
CTP QC/QA: YES
POC MOLECULAR INFLUENZA A AGN: NEGATIVE
POC MOLECULAR INFLUENZA B AGN: NEGATIVE

## 2022-10-29 PROCEDURE — 99215 OFFICE O/P EST HI 40 MIN: CPT | Mod: 25,S$GLB,, | Performed by: PHYSICIAN ASSISTANT

## 2022-10-29 PROCEDURE — 99215 PR OFFICE/OUTPT VISIT, EST, LEVL V, 40-54 MIN: ICD-10-PCS | Mod: 25,S$GLB,, | Performed by: PHYSICIAN ASSISTANT

## 2022-10-29 PROCEDURE — 87502 INFLUENZA DNA AMP PROBE: CPT | Mod: QW,S$GLB,, | Performed by: PHYSICIAN ASSISTANT

## 2022-10-29 PROCEDURE — 96372 THER/PROPH/DIAG INJ SC/IM: CPT | Mod: S$GLB,,, | Performed by: FAMILY MEDICINE

## 2022-10-29 PROCEDURE — 87502 POCT INFLUENZA A/B MOLECULAR: ICD-10-PCS | Mod: QW,S$GLB,, | Performed by: PHYSICIAN ASSISTANT

## 2022-10-29 PROCEDURE — 96372 PR INJECTION,THERAP/PROPH/DIAG2ST, IM OR SUBCUT: ICD-10-PCS | Mod: S$GLB,,, | Performed by: FAMILY MEDICINE

## 2022-10-29 RX ORDER — ALBUTEROL SULFATE 90 UG/1
2 AEROSOL, METERED RESPIRATORY (INHALATION) EVERY 6 HOURS PRN
Qty: 18 G | Refills: 1 | Status: SHIPPED | OUTPATIENT
Start: 2022-10-29

## 2022-10-29 RX ORDER — FLUTICASONE PROPIONATE 50 MCG
1 SPRAY, SUSPENSION (ML) NASAL DAILY
Qty: 16 G | Refills: 3 | Status: SHIPPED | OUTPATIENT
Start: 2022-10-29 | End: 2023-04-24

## 2022-10-29 RX ORDER — GUAIFENESIN/DEXTROMETHORPHAN 100-10MG/5
5 SYRUP ORAL EVERY 4 HOURS PRN
Qty: 236 ML | Refills: 0 | Status: SHIPPED | OUTPATIENT
Start: 2022-10-29 | End: 2022-11-08

## 2022-10-29 RX ORDER — AZITHROMYCIN 250 MG/1
TABLET, FILM COATED ORAL
Qty: 6 TABLET | Refills: 0 | Status: SHIPPED | OUTPATIENT
Start: 2022-10-29 | End: 2022-11-03

## 2022-10-29 RX ORDER — DEXAMETHASONE SODIUM PHOSPHATE 100 MG/10ML
5 INJECTION INTRAMUSCULAR; INTRAVENOUS ONCE
Status: COMPLETED | OUTPATIENT
Start: 2022-10-29 | End: 2022-10-29

## 2022-10-29 RX ORDER — PROMETHAZINE HYDROCHLORIDE AND DEXTROMETHORPHAN HYDROBROMIDE 6.25; 15 MG/5ML; MG/5ML
5 SYRUP ORAL EVERY 8 HOURS PRN
Qty: 118 ML | Refills: 0 | Status: SHIPPED | OUTPATIENT
Start: 2022-10-29 | End: 2022-11-08

## 2022-10-29 RX ADMIN — DEXAMETHASONE SODIUM PHOSPHATE 5 MG: 100 INJECTION INTRAMUSCULAR; INTRAVENOUS at 05:10

## 2022-10-29 NOTE — PROGRESS NOTES
"Subjective:       Patient ID: Alvina Fisher is a 71 y.o. female.    Vitals:  height is 5' 5" (1.651 m) and weight is 94.3 kg (208 lb). Her temperature is 100 °F (37.8 °C). Her blood pressure is 125/85 and her pulse is 100. Her oxygen saturation is 96%.     Chief Complaint: Cough (Fatigue)    71-year-old female history of smoking, sleep apnea, diabetes who comes in complaining of cough congestion and wheezing with some increased shortness of breath since yesterday.  She also complains of fatigue malaise.    Cough  Associated symptoms include chills, a fever, postnasal drip and a sore throat.     Constitution: Positive for chills, sweating and fever.   HENT:  Positive for congestion, postnasal drip and sore throat.    Respiratory:  Positive for cough.    Gastrointestinal:  Positive for nausea and diarrhea.   Skin:  Negative for erythema.     Objective:      Physical Exam   Constitutional: She is oriented to person, place, and time. She appears well-developed. She is cooperative.  Non-toxic appearance. She does not appear ill. No distress.   HENT:   Head: Normocephalic and atraumatic.   Ears:   Right Ear: Hearing, tympanic membrane, external ear and ear canal normal.   Left Ear: Hearing, tympanic membrane, external ear and ear canal normal.   Nose: Nose normal. No mucosal edema, rhinorrhea or nasal deformity. No epistaxis. Right sinus exhibits no maxillary sinus tenderness and no frontal sinus tenderness. Left sinus exhibits no maxillary sinus tenderness and no frontal sinus tenderness.   Mouth/Throat: Uvula is midline, oropharynx is clear and moist and mucous membranes are normal. No trismus in the jaw. Normal dentition. No uvula swelling. No oropharyngeal exudate, posterior oropharyngeal edema or posterior oropharyngeal erythema.   Eyes: Conjunctivae, EOM and lids are normal. Pupils are equal, round, and reactive to light. Right eye exhibits no discharge. Left eye exhibits no discharge. No scleral icterus.   Neck: " Trachea normal and phonation normal. Neck supple. No JVD present. No tracheal deviation present. No thyromegaly present. No edema present. No erythema present. No neck rigidity present.   Cardiovascular: Normal rate, regular rhythm, normal heart sounds and normal pulses.   No murmur heard.Exam reveals no gallop and no friction rub.   Pulmonary/Chest: Effort normal. No stridor. No respiratory distress. She has no decreased breath sounds. She has wheezes. She has no rhonchi. She has no rales. She exhibits no tenderness.         Comments: Patient has very good air movement throughout all lung fields with some mild end expiratory wheezing.    Abdominal: Normal appearance. She exhibits no distension. Soft. There is no abdominal tenderness. There is no rebound and no guarding.   Musculoskeletal: Normal range of motion.         General: No deformity. Normal range of motion.   Neurological: She is alert and oriented to person, place, and time. She exhibits normal muscle tone. Coordination normal.   Skin: Skin is warm, dry, intact, not diaphoretic, not pale and no rash. Capillary refill takes less than 2 seconds. No bruising, No erythema and No lesion jaundice  Psychiatric: Her speech is normal and behavior is normal. Judgment and thought content normal.   Nursing note and vitals reviewed.      Results for orders placed or performed in visit on 10/29/22   POCT Influenza A/B MOLECULAR   Result Value Ref Range    POC Molecular Influenza A Ag Negative Negative, Not Reported    POC Molecular Influenza B Ag Negative Negative, Not Reported     Acceptable Yes     No results found.     Assessment:       1. Flu-like symptoms    2. Cough, unspecified type    3. Bronchitis    4. Abnormal sputum color    5. Fever, unspecified fever cause    6. Nasal congestion    7. Wheeze          Plan:         Flu-like symptoms  -     POCT Influenza A/B MOLECULAR    Cough, unspecified type  -     dextromethorphan-guaiFENesin  mg/5  ml (ROBITUSSIN-DM)  mg/5 mL liquid; Take 5 mLs by mouth every 4 (four) hours as needed (Cough).  Dispense: 236 mL; Refill: 0  -     promethazine-dextromethorphan (PROMETHAZINE-DM) 6.25-15 mg/5 mL Syrp; Take 5 mLs by mouth every 8 (eight) hours as needed (Cough).  Dispense: 118 mL; Refill: 0    Bronchitis  -     albuterol (VENTOLIN HFA) 90 mcg/actuation inhaler; Inhale 2 puffs into the lungs every 6 (six) hours as needed for Wheezing. Rescue  Dispense: 18 g; Refill: 1  -     dexAMETHasone injection 5 mg    Abnormal sputum color  -     azithromycin (Z-ANTONIA) 250 MG tablet; Take 2 tablets by mouth on day 1; Take 1 tablet by mouth on days 2-5  Dispense: 6 tablet; Refill: 0    Fever, unspecified fever cause  -     azithromycin (Z-ANTONIA) 250 MG tablet; Take 2 tablets by mouth on day 1; Take 1 tablet by mouth on days 2-5  Dispense: 6 tablet; Refill: 0    Nasal congestion  -     fluticasone propionate (FLONASE) 50 mcg/actuation nasal spray; 1 spray (50 mcg total) by Each Nostril route once daily.  Dispense: 16 g; Refill: 3    Wheeze  -     dexAMETHasone injection 5 mg       Follow up if symptoms worsen or fail to improve, for F/U with PCP or ED. There are no Patient Instructions on file for this visit.     Medical Decision Making:   History:   I obtained history from: someone other than patient and another health care provider.  Old Medical Records: I decided to obtain old medical records.  Old Records Summarized: records from clinic visits, records from previous admission(s) and records from another hospital.  Clinical Tests:   Lab Tests: Ordered and Reviewed  Other:   I have discussed this case with another health care provider.

## 2022-12-28 ENCOUNTER — PATIENT MESSAGE (OUTPATIENT)
Dept: OTHER | Facility: OTHER | Age: 72
End: 2022-12-28
Payer: MEDICARE

## 2022-12-29 ENCOUNTER — CLINICAL SUPPORT (OUTPATIENT)
Dept: FAMILY MEDICINE | Facility: CLINIC | Age: 72
End: 2022-12-29
Payer: MEDICARE

## 2022-12-29 DIAGNOSIS — Z23 FLU VACCINE NEED: Primary | ICD-10-CM

## 2022-12-29 PROCEDURE — 90694 VACC AIIV4 NO PRSRV 0.5ML IM: CPT | Mod: PBBFAC,PO

## 2022-12-29 PROCEDURE — G0008 ADMIN INFLUENZA VIRUS VAC: HCPCS | Mod: PBBFAC,PO

## 2022-12-29 PROCEDURE — 99999 PR PBB SHADOW E&M-EST. PATIENT-LVL I: CPT | Mod: PBBFAC,,,

## 2022-12-29 PROCEDURE — 99999 PR PBB SHADOW E&M-EST. PATIENT-LVL I: ICD-10-PCS | Mod: PBBFAC,,,

## 2023-02-06 DIAGNOSIS — E11.42 TYPE 2 DIABETES MELLITUS WITH DIABETIC POLYNEUROPATHY, WITHOUT LONG-TERM CURRENT USE OF INSULIN: ICD-10-CM

## 2023-02-06 RX ORDER — FUROSEMIDE 20 MG/1
20 TABLET ORAL DAILY
Qty: 90 TABLET | Refills: 3 | Status: SHIPPED | OUTPATIENT
Start: 2023-02-06 | End: 2023-03-03 | Stop reason: SDUPTHER

## 2023-02-06 RX ORDER — METFORMIN HYDROCHLORIDE 500 MG/1
1000 TABLET, EXTENDED RELEASE ORAL 2 TIMES DAILY WITH MEALS
Qty: 360 TABLET | Refills: 3 | Status: SHIPPED | OUTPATIENT
Start: 2023-02-06 | End: 2023-03-03 | Stop reason: SDUPTHER

## 2023-02-06 NOTE — TELEPHONE ENCOUNTER
Care Due:                  Date            Visit Type   Department     Provider  --------------------------------------------------------------------------------                                [facility]                              PRIMARY      MED/ INTERNAL  Last Visit: 09-      CARE ([facility])   MED/ ROGER Phillips                              [facility]                              PRIMARY      MED/ INTERNAL  Next Visit: 03-      CARE ([facility])   MED/ PEDS      Clement Phillips                                                            Last  Test          Frequency    Reason                     Performed    Due Date  --------------------------------------------------------------------------------    HBA1C.......  6 months...  metFORMIN................  08- 02-    Lipid Panel.  12 months..  rosuvastatin.............  01- 01-    [facility] Embedded Care Gaps. Reference number: [id number]. 2/06/2023   9:50:35 AM CST

## 2023-02-08 DIAGNOSIS — E03.8 HYPOTHYROIDISM DUE TO HASHIMOTO'S THYROIDITIS: ICD-10-CM

## 2023-02-08 DIAGNOSIS — E06.3 HYPOTHYROIDISM DUE TO HASHIMOTO'S THYROIDITIS: ICD-10-CM

## 2023-02-08 DIAGNOSIS — E78.5 DYSLIPIDEMIA: ICD-10-CM

## 2023-02-08 NOTE — TELEPHONE ENCOUNTER
No new care gaps identified.  [facility] Embedded Care Gaps. Reference number: [id number]. 2/08/2023   4:51:36 PM CST

## 2023-02-08 NOTE — TELEPHONE ENCOUNTER
----- Message from Malathi Chris sent at 2/8/2023  4:25 PM CST -----  Type: RX Refill Request    Who Called: [facility] pharmacy [phone]    Have you contacted your pharmacy:    Refill or New Rx:gabapentin (NEURONTIN) 300 MG capsule - needs new prescription    levothyroxine (SYNTHROID) 100 MCG tablet - needs new prescription    rosuvastatin (CRESTOR) 10 MG tablet - needs new prescription      RX Name and Strength:    How is the patient currently taking it? (ex. 1XDay):    Is this a 30 day or 90 day RX:    Preferred Pharmacy with phone number:    Local or Mail Order:    Ordering Provider:    Would the patient rather a call back or a response via [facility]?     Best Call Back Number:    Additional Information:

## 2023-02-09 RX ORDER — LEVOTHYROXINE SODIUM 100 UG/1
100 TABLET ORAL
Qty: 90 TABLET | Refills: 3 | Status: SHIPPED | OUTPATIENT
Start: 2023-02-09 | End: 2023-03-03 | Stop reason: SDUPTHER

## 2023-02-09 RX ORDER — ROSUVASTATIN CALCIUM 10 MG/1
10 TABLET, COATED ORAL NIGHTLY
Qty: 90 TABLET | Refills: 3 | Status: SHIPPED | OUTPATIENT
Start: 2023-02-09 | End: 2023-03-03 | Stop reason: SDUPTHER

## 2023-02-27 ENCOUNTER — PATIENT MESSAGE (OUTPATIENT)
Dept: FAMILY MEDICINE | Facility: CLINIC | Age: 73
End: 2023-02-27
Payer: MEDICARE

## 2023-02-28 ENCOUNTER — LAB VISIT (OUTPATIENT)
Dept: LAB | Facility: HOSPITAL | Age: 73
End: 2023-02-28
Attending: INTERNAL MEDICINE
Payer: MEDICARE

## 2023-02-28 DIAGNOSIS — E03.8 HYPOTHYROIDISM DUE TO HASHIMOTO'S THYROIDITIS: ICD-10-CM

## 2023-02-28 DIAGNOSIS — E11.42 TYPE 2 DIABETES MELLITUS WITH DIABETIC POLYNEUROPATHY, WITHOUT LONG-TERM CURRENT USE OF INSULIN: ICD-10-CM

## 2023-02-28 DIAGNOSIS — E06.3 HYPOTHYROIDISM DUE TO HASHIMOTO'S THYROIDITIS: ICD-10-CM

## 2023-02-28 LAB
ALBUMIN SERPL BCP-MCNC: 4.1 G/DL (ref 3.5–5.2)
ALP SERPL-CCNC: 59 U/L (ref 55–135)
ALT SERPL W/O P-5'-P-CCNC: 33 U/L (ref 10–44)
ANION GAP SERPL CALC-SCNC: 8 MMOL/L (ref 8–16)
AST SERPL-CCNC: 26 U/L (ref 10–40)
BASOPHILS # BLD AUTO: 0.06 K/UL (ref 0–0.2)
BASOPHILS NFR BLD: 0.9 % (ref 0–1.9)
BILIRUB SERPL-MCNC: 0.7 MG/DL (ref 0.1–1)
BUN SERPL-MCNC: 18 MG/DL (ref 8–23)
CALCIUM SERPL-MCNC: 10.1 MG/DL (ref 8.7–10.5)
CHLORIDE SERPL-SCNC: 108 MMOL/L (ref 95–110)
CHOLEST SERPL-MCNC: 93 MG/DL (ref 120–199)
CHOLEST/HDLC SERPL: 2.4 {RATIO} (ref 2–5)
CO2 SERPL-SCNC: 26 MMOL/L (ref 23–29)
CREAT SERPL-MCNC: 0.8 MG/DL (ref 0.5–1.4)
DIFFERENTIAL METHOD: ABNORMAL
EOSINOPHIL # BLD AUTO: 0.5 K/UL (ref 0–0.5)
EOSINOPHIL NFR BLD: 7.3 % (ref 0–8)
ERYTHROCYTE [DISTWIDTH] IN BLOOD BY AUTOMATED COUNT: 13.6 % (ref 11.5–14.5)
EST. GFR  (NO RACE VARIABLE): >60 ML/MIN/1.73 M^2
ESTIMATED AVG GLUCOSE: 131 MG/DL (ref 68–131)
GLUCOSE SERPL-MCNC: 106 MG/DL (ref 70–110)
HBA1C MFR BLD: 6.2 % (ref 4–5.6)
HCT VFR BLD AUTO: 43 % (ref 37–48.5)
HDLC SERPL-MCNC: 38 MG/DL (ref 40–75)
HDLC SERPL: 40.9 % (ref 20–50)
HGB BLD-MCNC: 13.4 G/DL (ref 12–16)
IMM GRANULOCYTES # BLD AUTO: 0.01 K/UL (ref 0–0.04)
IMM GRANULOCYTES NFR BLD AUTO: 0.2 % (ref 0–0.5)
LDLC SERPL CALC-MCNC: 36.2 MG/DL (ref 63–159)
LYMPHOCYTES # BLD AUTO: 3.2 K/UL (ref 1–4.8)
LYMPHOCYTES NFR BLD: 49.9 % (ref 18–48)
MCH RBC QN AUTO: 29.3 PG (ref 27–31)
MCHC RBC AUTO-ENTMCNC: 31.2 G/DL (ref 32–36)
MCV RBC AUTO: 94 FL (ref 82–98)
MONOCYTES # BLD AUTO: 0.6 K/UL (ref 0.3–1)
MONOCYTES NFR BLD: 9.3 % (ref 4–15)
NEUTROPHILS # BLD AUTO: 2.1 K/UL (ref 1.8–7.7)
NEUTROPHILS NFR BLD: 32.4 % (ref 38–73)
NONHDLC SERPL-MCNC: 55 MG/DL
NRBC BLD-RTO: 0 /100 WBC
PLATELET # BLD AUTO: 200 K/UL (ref 150–450)
PMV BLD AUTO: 11.4 FL (ref 9.2–12.9)
POTASSIUM SERPL-SCNC: 4.3 MMOL/L (ref 3.5–5.1)
PROT SERPL-MCNC: 7.3 G/DL (ref 6–8.4)
RBC # BLD AUTO: 4.58 M/UL (ref 4–5.4)
SODIUM SERPL-SCNC: 142 MMOL/L (ref 136–145)
TRIGL SERPL-MCNC: 94 MG/DL (ref 30–150)
TSH SERPL DL<=0.005 MIU/L-ACNC: 0.6 UIU/ML (ref 0.4–4)
WBC # BLD AUTO: 6.47 K/UL (ref 3.9–12.7)

## 2023-02-28 PROCEDURE — 84443 ASSAY THYROID STIM HORMONE: CPT | Performed by: INTERNAL MEDICINE

## 2023-02-28 PROCEDURE — 36415 COLL VENOUS BLD VENIPUNCTURE: CPT | Mod: PO | Performed by: INTERNAL MEDICINE

## 2023-02-28 PROCEDURE — 80061 LIPID PANEL: CPT | Performed by: INTERNAL MEDICINE

## 2023-02-28 PROCEDURE — 83036 HEMOGLOBIN GLYCOSYLATED A1C: CPT | Performed by: INTERNAL MEDICINE

## 2023-02-28 PROCEDURE — 85025 COMPLETE CBC W/AUTO DIFF WBC: CPT | Performed by: INTERNAL MEDICINE

## 2023-02-28 PROCEDURE — 80053 COMPREHEN METABOLIC PANEL: CPT | Performed by: INTERNAL MEDICINE

## 2023-03-03 ENCOUNTER — OFFICE VISIT (OUTPATIENT)
Dept: FAMILY MEDICINE | Facility: CLINIC | Age: 73
End: 2023-03-03
Payer: MEDICARE

## 2023-03-03 VITALS
BODY MASS INDEX: 33.52 KG/M2 | WEIGHT: 201.19 LBS | DIASTOLIC BLOOD PRESSURE: 68 MMHG | OXYGEN SATURATION: 96 % | HEIGHT: 65 IN | TEMPERATURE: 98 F | HEART RATE: 79 BPM | SYSTOLIC BLOOD PRESSURE: 110 MMHG

## 2023-03-03 DIAGNOSIS — E06.3 HYPOTHYROIDISM DUE TO HASHIMOTO'S THYROIDITIS: ICD-10-CM

## 2023-03-03 DIAGNOSIS — R00.2 PALPITATIONS: ICD-10-CM

## 2023-03-03 DIAGNOSIS — Z00.00 NORMAL PHYSICAL EXAM: Primary | ICD-10-CM

## 2023-03-03 DIAGNOSIS — I51.89 DIASTOLIC DYSFUNCTION: ICD-10-CM

## 2023-03-03 DIAGNOSIS — R31.0 GROSS HEMATURIA: ICD-10-CM

## 2023-03-03 DIAGNOSIS — G89.18 CHEST WALL PAIN FOLLOWING SURGERY: ICD-10-CM

## 2023-03-03 DIAGNOSIS — E03.8 HYPOTHYROIDISM DUE TO HASHIMOTO'S THYROIDITIS: ICD-10-CM

## 2023-03-03 DIAGNOSIS — D3A.090 CARCINOID TUMOR OF LUNG, UNSPECIFIED WHETHER MALIGNANT: ICD-10-CM

## 2023-03-03 DIAGNOSIS — E11.42 TYPE 2 DIABETES MELLITUS WITH DIABETIC POLYNEUROPATHY, WITHOUT LONG-TERM CURRENT USE OF INSULIN: ICD-10-CM

## 2023-03-03 DIAGNOSIS — I50.32 CHRONIC HEART FAILURE WITH PRESERVED EJECTION FRACTION: ICD-10-CM

## 2023-03-03 DIAGNOSIS — F43.20 ADJUSTMENT DISORDER, UNSPECIFIED TYPE: ICD-10-CM

## 2023-03-03 DIAGNOSIS — E78.5 DYSLIPIDEMIA: ICD-10-CM

## 2023-03-03 DIAGNOSIS — E66.09 CLASS 1 OBESITY DUE TO EXCESS CALORIES WITH SERIOUS COMORBIDITY AND BODY MASS INDEX (BMI) OF 33.0 TO 33.9 IN ADULT: ICD-10-CM

## 2023-03-03 DIAGNOSIS — R07.89 CHEST WALL PAIN FOLLOWING SURGERY: ICD-10-CM

## 2023-03-03 PROCEDURE — 3288F FALL RISK ASSESSMENT DOCD: CPT | Mod: CPTII,S$GLB,, | Performed by: INTERNAL MEDICINE

## 2023-03-03 PROCEDURE — 3066F PR DOCUMENTATION OF TREATMENT FOR NEPHROPATHY: ICD-10-PCS | Mod: CPTII,S$GLB,, | Performed by: INTERNAL MEDICINE

## 2023-03-03 PROCEDURE — 3044F PR MOST RECENT HEMOGLOBIN A1C LEVEL <7.0%: ICD-10-PCS | Mod: CPTII,S$GLB,, | Performed by: INTERNAL MEDICINE

## 2023-03-03 PROCEDURE — 3288F PR FALLS RISK ASSESSMENT DOCUMENTED: ICD-10-PCS | Mod: CPTII,S$GLB,, | Performed by: INTERNAL MEDICINE

## 2023-03-03 PROCEDURE — 1126F PR PAIN SEVERITY QUANTIFIED, NO PAIN PRESENT: ICD-10-PCS | Mod: CPTII,S$GLB,, | Performed by: INTERNAL MEDICINE

## 2023-03-03 PROCEDURE — 1159F MED LIST DOCD IN RCRD: CPT | Mod: CPTII,S$GLB,, | Performed by: INTERNAL MEDICINE

## 2023-03-03 PROCEDURE — 99999 PR PBB SHADOW E&M-EST. PATIENT-LVL V: ICD-10-PCS | Mod: PBBFAC,,, | Performed by: INTERNAL MEDICINE

## 2023-03-03 PROCEDURE — 3061F NEG MICROALBUMINURIA REV: CPT | Mod: CPTII,S$GLB,, | Performed by: INTERNAL MEDICINE

## 2023-03-03 PROCEDURE — 3066F NEPHROPATHY DOC TX: CPT | Mod: CPTII,S$GLB,, | Performed by: INTERNAL MEDICINE

## 2023-03-03 PROCEDURE — 3074F PR MOST RECENT SYSTOLIC BLOOD PRESSURE < 130 MM HG: ICD-10-PCS | Mod: CPTII,S$GLB,, | Performed by: INTERNAL MEDICINE

## 2023-03-03 PROCEDURE — 99397 PR PREVENTIVE VISIT,EST,65 & OVER: ICD-10-PCS | Mod: S$GLB,,, | Performed by: INTERNAL MEDICINE

## 2023-03-03 PROCEDURE — 1159F PR MEDICATION LIST DOCUMENTED IN MEDICAL RECORD: ICD-10-PCS | Mod: CPTII,S$GLB,, | Performed by: INTERNAL MEDICINE

## 2023-03-03 PROCEDURE — 1160F PR REVIEW ALL MEDS BY PRESCRIBER/CLIN PHARMACIST DOCUMENTED: ICD-10-PCS | Mod: CPTII,S$GLB,, | Performed by: INTERNAL MEDICINE

## 2023-03-03 PROCEDURE — 3078F DIAST BP <80 MM HG: CPT | Mod: CPTII,S$GLB,, | Performed by: INTERNAL MEDICINE

## 2023-03-03 PROCEDURE — 1101F PT FALLS ASSESS-DOCD LE1/YR: CPT | Mod: CPTII,S$GLB,, | Performed by: INTERNAL MEDICINE

## 2023-03-03 PROCEDURE — 3008F PR BODY MASS INDEX (BMI) DOCUMENTED: ICD-10-PCS | Mod: CPTII,S$GLB,, | Performed by: INTERNAL MEDICINE

## 2023-03-03 PROCEDURE — 99999 PR PBB SHADOW E&M-EST. PATIENT-LVL V: CPT | Mod: PBBFAC,,, | Performed by: INTERNAL MEDICINE

## 2023-03-03 PROCEDURE — 3078F PR MOST RECENT DIASTOLIC BLOOD PRESSURE < 80 MM HG: ICD-10-PCS | Mod: CPTII,S$GLB,, | Performed by: INTERNAL MEDICINE

## 2023-03-03 PROCEDURE — 3074F SYST BP LT 130 MM HG: CPT | Mod: CPTII,S$GLB,, | Performed by: INTERNAL MEDICINE

## 2023-03-03 PROCEDURE — 1126F AMNT PAIN NOTED NONE PRSNT: CPT | Mod: CPTII,S$GLB,, | Performed by: INTERNAL MEDICINE

## 2023-03-03 PROCEDURE — 3008F BODY MASS INDEX DOCD: CPT | Mod: CPTII,S$GLB,, | Performed by: INTERNAL MEDICINE

## 2023-03-03 PROCEDURE — 3061F PR NEG MICROALBUMINURIA RESULT DOCUMENTED/REVIEW: ICD-10-PCS | Mod: CPTII,S$GLB,, | Performed by: INTERNAL MEDICINE

## 2023-03-03 PROCEDURE — 1101F PR PT FALLS ASSESS DOC 0-1 FALLS W/OUT INJ PAST YR: ICD-10-PCS | Mod: CPTII,S$GLB,, | Performed by: INTERNAL MEDICINE

## 2023-03-03 PROCEDURE — 3044F HG A1C LEVEL LT 7.0%: CPT | Mod: CPTII,S$GLB,, | Performed by: INTERNAL MEDICINE

## 2023-03-03 PROCEDURE — 1160F RVW MEDS BY RX/DR IN RCRD: CPT | Mod: CPTII,S$GLB,, | Performed by: INTERNAL MEDICINE

## 2023-03-03 PROCEDURE — 99397 PER PM REEVAL EST PAT 65+ YR: CPT | Mod: S$GLB,,, | Performed by: INTERNAL MEDICINE

## 2023-03-03 RX ORDER — DULAGLUTIDE 1.5 MG/.5ML
1.5 INJECTION, SOLUTION SUBCUTANEOUS
Qty: 4 PEN | Refills: 11 | Status: SHIPPED | OUTPATIENT
Start: 2023-03-03 | End: 2023-09-12 | Stop reason: SDUPTHER

## 2023-03-03 RX ORDER — DIAZEPAM 5 MG/1
5 TABLET ORAL NIGHTLY PRN
Qty: 14 TABLET | Refills: 0 | Status: SHIPPED | OUTPATIENT
Start: 2023-03-03 | End: 2024-03-15 | Stop reason: SDUPTHER

## 2023-03-03 RX ORDER — METOPROLOL SUCCINATE 25 MG/1
12.5 TABLET, EXTENDED RELEASE ORAL DAILY
Qty: 90 TABLET | Refills: 3 | Status: SHIPPED | OUTPATIENT
Start: 2023-03-03 | End: 2023-04-24

## 2023-03-03 RX ORDER — LEVOTHYROXINE SODIUM 100 UG/1
100 TABLET ORAL
Qty: 90 TABLET | Refills: 3 | Status: SHIPPED | OUTPATIENT
Start: 2023-03-03 | End: 2024-03-15 | Stop reason: SDUPTHER

## 2023-03-03 RX ORDER — ROSUVASTATIN CALCIUM 10 MG/1
10 TABLET, COATED ORAL NIGHTLY
Qty: 90 TABLET | Refills: 3 | Status: SHIPPED | OUTPATIENT
Start: 2023-03-03 | End: 2024-03-15 | Stop reason: SDUPTHER

## 2023-03-03 RX ORDER — METFORMIN HYDROCHLORIDE 500 MG/1
1000 TABLET, EXTENDED RELEASE ORAL 2 TIMES DAILY WITH MEALS
Qty: 360 TABLET | Refills: 3 | Status: SHIPPED | OUTPATIENT
Start: 2023-03-03 | End: 2024-03-15 | Stop reason: SDUPTHER

## 2023-03-03 RX ORDER — GABAPENTIN 300 MG/1
300 CAPSULE ORAL NIGHTLY
Qty: 90 CAPSULE | Refills: 3 | Status: SHIPPED | OUTPATIENT
Start: 2023-03-03 | End: 2023-04-24

## 2023-03-03 RX ORDER — FUROSEMIDE 20 MG/1
20 TABLET ORAL DAILY
Qty: 90 TABLET | Refills: 3 | Status: SHIPPED | OUTPATIENT
Start: 2023-03-03 | End: 2024-03-02

## 2023-03-03 RX ORDER — DULAGLUTIDE 0.75 MG/.5ML
0.75 INJECTION, SOLUTION SUBCUTANEOUS
Qty: 4 PEN | Refills: 0 | Status: SHIPPED | OUTPATIENT
Start: 2023-03-03 | End: 2023-04-02

## 2023-03-03 NOTE — PROGRESS NOTES
This note was created by combination of typed  and M-Modal dictation.  Transcription errors may be present.  If there are any questions, please contact me.    Assessment and Plan:   Normal physical exam    Gross hematuria  -painless, earlier this week. Hx of kidney stone. Need to pursue to resolution.  CT urogram, referral to urology.  -     Ambulatory referral/consult to Urology; Future; Expected date: 03/10/2023  -     CT Urogram Abd Pelvis W WO; Future; Expected date: 03/03/2023    Type 2 diabetes mellitus with diabetic polyneuropathy, without long-term current use of insulin  Severe obesity (BMI 35.0-39.9) with comorbidity  -a1c good. She would like it lower.  Hx of trulicity stopped previously due to cost.  Rechallenge.  Stay on metformin  -     dulaglutide (TRULICITY) 0.75 mg/0.5 mL pen injector; Inject 0.75 mg into the skin every 7 days. For first month  Dispense: 4 pen; Refill: 0  -     dulaglutide (TRULICITY) 1.5 mg/0.5 mL pen injector; Inject 1.5 mg into the skin every 7 days. From month 2 onward  Dispense: 4 pen; Refill: 11  -     metFORMIN (GLUCOPHAGE-XR) 500 MG ER 24hr tablet; Take 2 tablets (1,000 mg total) by mouth 2 (two) times daily with meals.  Dispense: 360 tablet; Refill: 3  -     Comprehensive Metabolic Panel; Future; Expected date: 08/30/2023  -     Hemoglobin A1C; Future; Expected date: 08/30/2023  -     Microalbumin/Creatinine Ratio, Urine; Future; Expected date: 08/30/2023  -     CBC Auto Differential; Future; Expected date: 08/30/2023    Chronic heart failure with preserved ejection fraction  Diastolic dysfunction  Palpitations  -euvolemic. Followed by outside cards. On toprol xl 12.5 and lasix every other day.  -     furosemide (LASIX) 20 MG tablet; Take 1 tablet (20 mg total) by mouth once daily.  Dispense: 90 tablet; Refill: 3  -     metoprolol succinate (TOPROL-XL) 25 MG 24 hr tablet; Take 0.5 tablets (12.5 mg total) by mouth once daily.  Dispense: 90 tablet; Refill:  3    Dyslipidemia  -pre visit labs stable on low dose statin  -     rosuvastatin (CRESTOR) 10 MG tablet; Take 1 tablet (10 mg total) by mouth nightly.  Dispense: 90 tablet; Refill: 3    Carcinoid tumor of lung, unspecified whether malignant  -managed by heme/onc.    Hypothyroidism due to Hashimoto's thyroiditis  -pre visit labs stable on  no change.  -     levothyroxine (SYNTHROID) 100 MCG tablet; Take 1 tablet (100 mcg total) by mouth before breakfast.  Dispense: 90 tablet; Refill: 3    Chest wall pain following surgery  -stable on natalia no changes.   -     gabapentin (NEURONTIN) 300 MG capsule; Take 1 capsule (300 mg total) by mouth every evening. Start with once nightly  Dispense: 90 capsule; Refill: 3    Adjustment disorder, unspecified type  -prn use of diazepam. refilled  -     diazePAM (VALIUM) 5 MG tablet; Take 1 tablet (5 mg total) by mouth nightly as needed for Anxiety.  Dispense: 14 tablet; Refill: 0    Red eye  -painless, incidental times today.  Monitor.  If it persists, she should follow-up with her ophthalmologist      Medications Discontinued During This Encounter   Medication Reason    gabapentin (NEURONTIN) 300 MG capsule Reorder    metoprolol succinate (TOPROL-XL) 25 MG 24 hr tablet Reorder    furosemide (LASIX) 20 MG tablet Reorder    metFORMIN (GLUCOPHAGE-XR) 500 MG ER 24hr tablet Reorder    rosuvastatin (CRESTOR) 10 MG tablet Reorder    levothyroxine (SYNTHROID) 100 MCG tablet Reorder    diazePAM (VALIUM) 5 MG tablet Reorder       meds sent this encounter:  Medications Ordered This Encounter   Medications    diazePAM (VALIUM) 5 MG tablet     Sig: Take 1 tablet (5 mg total) by mouth nightly as needed for Anxiety.     Dispense:  14 tablet     Refill:  0     Instead of klonopin    dulaglutide (TRULICITY) 0.75 mg/0.5 mL pen injector     Sig: Inject 0.75 mg into the skin every 7 days. For first month     Dispense:  4 pen     Refill:  0    dulaglutide (TRULICITY) 1.5 mg/0.5 mL pen injector      Sig: Inject 1.5 mg into the skin every 7 days. From month 2 onward     Dispense:  4 pen     Refill:  11    furosemide (LASIX) 20 MG tablet     Sig: Take 1 tablet (20 mg total) by mouth once daily.     Dispense:  90 tablet     Refill:  3    gabapentin (NEURONTIN) 300 MG capsule     Sig: Take 1 capsule (300 mg total) by mouth every evening. Start with once nightly     Dispense:  90 capsule     Refill:  3    levothyroxine (SYNTHROID) 100 MCG tablet     Sig: Take 1 tablet (100 mcg total) by mouth before breakfast.     Dispense:  90 tablet     Refill:  3    metFORMIN (GLUCOPHAGE-XR) 500 MG ER 24hr tablet     Sig: Take 2 tablets (1,000 mg total) by mouth 2 (two) times daily with meals.     Dispense:  360 tablet     Refill:  3     Increased dose    metoprolol succinate (TOPROL-XL) 25 MG 24 hr tablet     Sig: Take 0.5 tablets (12.5 mg total) by mouth once daily.     Dispense:  90 tablet     Refill:  3     .    rosuvastatin (CRESTOR) 10 MG tablet     Sig: Take 1 tablet (10 mg total) by mouth nightly.     Dispense:  90 tablet     Refill:  3       Follow Up: No follow-ups on file. OV 6 months with labs.     Subjective:     Chief Complaint   Patient presents with    Hematuria    Diabetes     Follow up        HPI  Alvina is a 72 y.o. female.    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 Patient has had a hysterectomy.    Last appointment with this clinic was 2022. Last visit with me 2022   To summarize last visit and events leading up to today:  DM2 metformin. On natalia. Hx of trulicity but expensive. Advised to see if alternatives are lower tier other  GLP1  Lipid/statin  Allergic rhinitis, astelin  Hypothyroid on LT  Fatty liver  Carcinoid tumor followed by outside heme/onc. 3/23/22 PET CT 1.   Stable postsurgical changes in the left lung without evidence of local recurrence or distant metastatic disease identified.  Saw danny onc in 9/2022. Plan is PET scan 3/2023.  HFpEF reportedly elevated end-diastolic pressure on left heart catheterization.  Was started on Entresto but unable to afford it.  She wanted 2nd opinion about her diagnosis so she came to [facility] for evaluation, concurred with diagnosis.  Because of her fatigue I believe Cardiology stopped her metoprolol.  Increased her quinapril to compensate.  Started her on Lasix.  She had been seeing Dr. Arredondo for a very long time and wants to return back to him.  She is going to schedule follow-up with him and see if her insurance might cover Entresto now that it is a new year. Subsequently restarted entresto. Saw cards 2/2022. Stable.   Saw cards 9/2022. Stable. Try reduced 1/2 metoprolol for fatigue  Palpitations, 2 week BG monitor. Saw EP. Restarted with metoprolol again. Sx improved.  Monitor with sinus tach  Adjustment disorder, Valium p.r.n.  Gabapentin for chest wall pain  SAM not using CPAP because of recall.  Saw pulm early June.  Had restarted CPAP with filter.  Saw pulm 12/2022. Stable. Stay on CPAP    Pre visit labs  CBC WNL  CMP WNL  Lipid good  A1c 6.2  TSH WNL      Today's visit:  Please note: Chronic medical problems that are stable but are being addressed at this visit may not be listed/documented here, but may be addressed in more detail in the Assessment and Plan section.   Below are salient features of chronic medical conditions, or new/acute conditions and their details.  Gross hematuria  Pink on Friday  Red on Saturday  Sometimes red on Sunday  Clear on Monday.   Has been back since then  Until today, clear.   Painless  Last time that was due to kidney stone  But this time so far no pain.     No vaginal bleeding.     She would like to see her A1c even lower than currently.  Hx of trulicity which was affordable until medicare.  New insurance would like to rechallenge    Taking Toprol XL 12.5.  And neck taking Lasix every other day.    She is having some aches and pains in her arms and her legs.  Mainly at night.  Not with exertion.  Thought it might be due to dehydration so that is why she cut down the Lasix.  Has not really improved.    Incidental red eye left.  No contact lens. Asymptomatic.    Some numbness to the left foot, residual from hx of sciatica.    Patient Care Team:  Clement Phillips MD as PCP - General (Internal Medicine)  Yuriy Amor MD as Consulting Physician (Internal Medicine)  Radha Calix MD (Pulmonary Disease)  Edmund Cantrell MD as Consulting Physician (Cardiothoracic Surgery)  Wilbert Arredondo MD (Cardiology)  Chris Muro MD as Consulting Physician (Ophthalmology)  [facility] (Gastroenterology)  Basilia Medley MA as Care Coordinator  David Bansal MD (Ophthalmology)  Hayley Pink MD (Hematology and Oncology)  Bonnie Francis as Digital Medicine Health   Clement Phillips MD as Diabetes Digital Medicine Responsible Provider (Internal Medicine)  Sofie Carrera, PharmD as Diabetes Digital Medicine Clinician (Pharmacist)  Danii Gatica Managed Medicare as Diabetes Digital Medicine Contract    Patient Active Problem List    Diagnosis Date Noted    Chronic heart failure with preserved ejection fraction 05/18/2022    Osteopenia of multiple sites 3/2022; repeat 2024 04/01/2022 03/31/2022 DEXA L-spine 1.2, total hip-1.2, femoral neck-1.5.  FRAX score 1.2/9.1%.  Osteopenia.  Compared to previous, decrease in BMD at lumbar spine and total hip.      Fatty liver 02/16/2022 09/08/2021 right upper quadrant ultrasound  Previous cholecystectomy. No biliary ductal dilatation is seen.  Diffusely increased parenchymal  echogenicity of the liver most consistent with steatosis. There is no sonographic evidence of cirrhosis and no focal liver lesions are seen.      Severe obesity (BMI 35.0-39.9) with comorbidity 02/16/2022    Dyslipidemia 09/21/2020     10/26/2021 TTE LV normal size and thickness.  Normal LV systolic function LVEF 60-65%.  Normal diastolic function. RV normal size and systolic function.  10/26/2021 nuclear medicine stress test abnormal moderate size moderate intensity partially reversible defect mid distal anterior wall.  11/15/2021 left heart catheterization no evidence of angiographically significant coronary artery disease.  By report, elevated EDP.      Hyperplastic polyp of sigmoid colon 08/19/2020 8/31/2017 Colonoscopy 3 mm rectal polyp.  Large internal hemorrhoids.  Hyperplastic polyp      Chronic superficial gastritis without bleeding on EGD 2017 08/19/2020 8/31/2017 EGD normal esophagus.  Patchy mild erythema of antrum.  Biopsied.  Normal duodenum.  Biopsy mild chronic gastritis H pylori negative      Hypothyroidism due to Hashimoto's thyroiditis 07/08/2020    Type 2 diabetes mellitus with diabetic polyneuropathy, without long-term current use of insulin 06/25/2020    Hepatitis C antibody test positive but VL negative, either exposed/immune or false positive initial screening 02/17/2020    Carcinoid tumor of lung 02/07/2020     History of left pulmonary nodule.  Seen by outside CT surgery.  Had previously been sent to intervention Radiology but was unable to biopsy the lesion.  Increased in size from initial 0.7-1.3 cm over 3 years.    9/12/19 CT-guided biopsy:  LUNG, LEFT LOWER LOBE, CORE BIOPSY:   - SMALL FRAGMENTS OF BENIGN PULMONARY ALVEOLAR PARENCHYMA.   - NO EVIDENCE OF NEOPLASM OR GRANULOMATOUS DISEASE.   6/11/20 lung bx  LUNG, NEEDLE BIOPSY, CLINICALLY LEFT LOWER LOBE:  --ATYPICAL ADENOMATOUS HYPERPLASIA ( 0.5 MM LESION, AS MEASURED ON   SLIDE).   She underwent on July 31, 2020 a left lower  lobe wedge resection of the mass with completion robotic lobectomy and mediastinal lymph node dissection.  The final pathology report showed the presence of low-grade carcinoid tumor measuring 1.20 cm that was low-grade with a Ki-67 of 3%. The margins were negative. The lymph node at the level 7, 8, 10 and 11 came negative for metastatic disease. There was no direct invasion of the adjacent structure no lymphovascular invasion. Her final stage was a pT1 pN0 pMX.  9/10/20 PET/CT neuroendocrine study:  1.   Postoperative changes of recent left lower lobe pulmonary nodule wedge resection with mild metabolic activity along the lateral margin of the suture line likely being postoperative in nature. Continued attention at this site is warranted on future imaging.  2.   No evidence of metastatic disease identified.  3/23/22 PET CT 1.   Stable postsurgical changes in the left lung without evidence of local recurrence or distant metastatic disease identified.      Family history of heart disease Dr. Arredondo 02/07/2020     10/03/2013 nuclear medicine stress test no significant EKG changes.  No chest pain. Normal perfusion scan.  Normal LV systolic function  10/03/2013 TTE normal LV size wall thickness and systolic function.  05/19/17 Nuclear stress test: Five minutes standard Dario protocol. 1 mm horizontal ST depression. Substernal chest heaviness and tightness, resolved in recovery. Perfusion scan normal.  05/17 Echocardiogram shows normal cardiac function. EF normal. Greater than 50%. Grade I diastolic function.  02/02/18 Coronary calcium score was 46 with 41 in the LAD, circumflex was 5. Her liver measured 41 Hounsfield units. The spleen could not be visualized. These findings are consistent with fatty liver. The textural appearance to me of the liver was that of fatty liver. She had normal origin of the coronary arteries, normal configuration of the pulmonary veins, left atrium 35 mm, calcified plaque was also seen in the  aortic annulus. Per Radiology, she had a 1 cm nodule in the left lower lobe. Radiology recommended followup in six months with CT.  6/22/20 nu med stress test  Nuclear scans show a small area of possible anterior wall ischemia. This could also be secondary to breast artefact. Clinical      correlation suggested.     Gated scan is wnl. EF 80%     Ekg portion of test is negative         Other specified glaucoma 02/07/2020    Chronic midline low back pain without sciatica hx of laminectomy L5S1; flexeril PRN 02/07/2020    Left lumbar radiculopathy from L sciatica, remote laminectomy but residual weakness/numbness L leg 02/07/2020    Arthritis of knee, right 05/03/2019    Complex tear of medial meniscus of right knee as current injury 05/03/2019    Mixed incontinence urge and stress (male)(female) 03/05/2013    Cystocele 03/05/2013    Rectocele 03/05/2013    Chronic constipation 03/05/2013    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       PAST MEDICAL PROBLEMS, PAST SURGICAL HISTORY: please see relevant portions of the electronic medical record    ALLERGIES AND MEDICATIONS: updated and reviewed.  Review of patient's allergies indicates:   Allergen Reactions    Oxycodone-acetaminophen Itching and Nausea Only     NOT ALLERGIC TO ACETAMINOPHEN, HAS TAKEN IT     Codeine Itching       Medication List with Changes/Refills   Current Medications    ACETAMINOPHEN (TYLENOL) 500 MG TABLET    Take 500 mg by mouth every 6 (six) hours as needed for Pain.    ALBUTEROL (VENTOLIN HFA) 90 MCG/ACTUATION INHALER    Inhale 2 puffs into the lungs every 6 (six) hours as needed for Wheezing. Rescue    ASPIRIN (ECOTRIN) 81 MG EC TABLET        AZELASTINE (ASTELIN) 137 MCG (0.1 %) NASAL SPRAY    1 spray (137 mcg total) by Nasal route 2 (two) times daily.    BRIMONIDINE 0.2% (ALPHAGAN) 0.2 % DROP        COENZYME Q10 200 MG CAPSULE    Take 200 mg by mouth.    DIAZEPAM (VALIUM) 5 MG TABLET    Take 1 tablet (5 mg total) by mouth nightly as needed for  "Anxiety.    DORZOLAMIDE-TIMOLOL 2-0.5% (COSOPT) 22.3-6.8 MG/ML OPHTHALMIC SOLUTION        ENTRESTO 24-26 MG PER TABLET    Take 1 tablet by mouth 2 (two) times daily.    FLUTICASONE PROPIONATE (FLONASE) 50 MCG/ACTUATION NASAL SPRAY    1 spray (50 mcg total) by Each Nostril route once daily.    FUROSEMIDE (LASIX) 20 MG TABLET    Take 1 tablet (20 mg total) by mouth once daily.    GABAPENTIN (NEURONTIN) 300 MG CAPSULE    Take 1 capsule (300 mg total) by mouth every evening. Start with once nightly    LEVOTHYROXINE (SYNTHROID) 100 MCG TABLET    Take 1 tablet (100 mcg total) by mouth before breakfast.    METFORMIN (GLUCOPHAGE-XR) 500 MG ER 24HR TABLET    Take 2 tablets (1,000 mg total) by mouth 2 (two) times daily with meals.    METOPROLOL SUCCINATE (TOPROL-XL) 25 MG 24 HR TABLET    Take 1 tablet (25 mg total) by mouth once daily.    ROSUVASTATIN (CRESTOR) 10 MG TABLET    Take 1 tablet (10 mg total) by mouth nightly.    VITAMIN D (VITAMIN D3) 1000 UNITS TAB            Objective:   Physical Exam   Vitals:    03/03/23 1322   BP: 110/68   BP Location: Left arm   Patient Position: Sitting   BP Method: Large (Manual)   Pulse: 79   Temp: 98.2 °F (36.8 °C)   TempSrc: Oral   SpO2: 96%   Weight: 91.3 kg (201 lb 2.7 oz)   Height: 5' 5" (1.651 m)    Body mass index is 33.48 kg/m².            Physical Exam  Constitutional:       Appearance: She is well-developed.   HENT:      Right Ear: Tympanic membrane, ear canal and external ear normal.      Left Ear: Tympanic membrane, ear canal and external ear normal.   Eyes:      General: No scleral icterus.     Extraocular Movements: Extraocular movements intact.      Pupils: Pupils are equal, round, and reactive to light.      Comments: Left eye scleral erythema uniform, iris unremarkable. Inner palpebral conjunctiva unremarkable.   Neck:      Thyroid: No thyromegaly.   Cardiovascular:      Rate and Rhythm: Normal rate and regular rhythm.      Pulses:           Dorsalis pedis pulses are 3+ " on the right side and 3+ on the left side.        Posterior tibial pulses are 3+ on the right side and 3+ on the left side.      Heart sounds: Normal heart sounds. No murmur heard.  Pulmonary:      Effort: Pulmonary effort is normal.      Breath sounds: Normal breath sounds. No wheezing.   Abdominal:      Palpations: Abdomen is soft. There is no hepatomegaly, splenomegaly or mass.      Tenderness: There is no abdominal tenderness.   Musculoskeletal:         General: No deformity. Normal range of motion.      Cervical back: Neck supple.      Right lower leg: No edema.      Left lower leg: No edema.      Right foot: No deformity.      Left foot: No deformity.   Feet:      Right foot:      Protective Sensation: 5 sites tested.  5 sites sensed.      Skin integrity: No ulcer, blister, skin breakdown, erythema or warmth.      Left foot:      Protective Sensation: 5 sites tested.  5 sites sensed.      Skin integrity: No ulcer, blister, skin breakdown, erythema or warmth.   Lymphadenopathy:      Cervical: No cervical adenopathy.   Skin:     General: Skin is warm and dry.      Findings: No rash.      Comments: On exposed skin   Neurological:      Mental Status: She is alert and oriented to person, place, and time.      Deep Tendon Reflexes: Reflexes are normal and symmetric.   Psychiatric:         Behavior: Behavior normal.         Thought Content: Thought content normal.         Judgment: Judgment normal.       Component      Latest Ref Rng & Units 2/28/2023 8/17/2022   WBC      3.90 - 12.70 K/uL 6.47 7.35   RBC      4.00 - 5.40 M/uL 4.58 4.52   Hemoglobin      12.0 - 16.0 g/dL 13.4 13.8   Hematocrit      37.0 - 48.5 % 43.0 40.9   MCV      82 - 98 fL 94 91   MCH      27.0 - 31.0 pg 29.3 30.5   MCHC      32.0 - 36.0 g/dL 31.2 (L) 33.7   RDW      11.5 - 14.5 % 13.6 13.3   Platelets      150 - 450 K/uL 200 203   MPV      9.2 - 12.9 fL 11.4 11.6   Immature Granulocytes      0.0 - 0.5 % 0.2 0.1   Gran # (ANC)      1.8 - 7.7 K/uL  2.1 2.5   Immature Grans (Abs)      0.00 - 0.04 K/uL 0.01 0.01   Lymph #      1.0 - 4.8 K/uL 3.2 3.7   Mono #      0.3 - 1.0 K/uL 0.6 0.7   Eos #      0.0 - 0.5 K/uL 0.5 0.5   Baso #      0.00 - 0.20 K/uL 0.06 0.09   nRBC      0 /100 WBC 0 0   Gran %      38.0 - 73.0 % 32.4 (L) 33.6 (L)   Lymph %      18.0 - 48.0 % 49.9 (H) 49.8 (H)   Mono %      4.0 - 15.0 % 9.3 9.0   Eosinophil %      0.0 - 8.0 % 7.3 6.3   Basophil %      0.0 - 1.9 % 0.9 1.2   Differential Method       Automated Automated   Sodium      136 - 145 mmol/L 142 141   Potassium      3.5 - 5.1 mmol/L 4.3 4.2   Chloride      95 - 110 mmol/L 108 105   CO2      23 - 29 mmol/L 26 25   Glucose      70 - 110 mg/dL 106 134 (H)   BUN      8 - 23 mg/dL 18 15   Creatinine      0.5 - 1.4 mg/dL 0.8 0.8   Calcium      8.7 - 10.5 mg/dL 10.1 9.8   PROTEIN TOTAL      6.0 - 8.4 g/dL 7.3 7.6   Albumin      3.5 - 5.2 g/dL 4.1 4.1   BILIRUBIN TOTAL      0.1 - 1.0 mg/dL 0.7 0.8   Alkaline Phosphatase      55 - 135 U/L 59 62   AST      10 - 40 U/L 26 29   ALT      10 - 44 U/L 33 35   Anion Gap      8 - 16 mmol/L 8 11   eGFR      >60 mL/min/1.73 m:2 >60.0 >60.0   Cholesterol      120 - 199 mg/dL 93 (L)    Triglycerides      30 - 150 mg/dL 94    HDL      40 - 75 mg/dL 38 (L)    LDL Cholesterol External      63.0 - 159.0 mg/dL 36.2 (L)    HDL/Cholesterol Ratio      20.0 - 50.0 % 40.9    Total Cholesterol/HDL Ratio      2.0 - 5.0 2.4    Non-HDL Cholesterol      mg/dL 55    Urine Microalbumin      ug/mL 10.0    Creatinine, Urine      15.0 - 325.0 mg/dL 155.0    MICROALB/CREAT RATIO      0.0 - 30.0 ug/mg 6.5    Hemoglobin A1C External      4.0 - 5.6 % 6.2 (H) 6.3 (H)   Estimated Avg Glucose      68 - 131 mg/dL 131 134 (H)   TSH      0.400 - 4.000 uIU/mL 0.605 0.588

## 2023-03-20 ENCOUNTER — OFFICE VISIT (OUTPATIENT)
Dept: UROLOGY | Facility: CLINIC | Age: 73
End: 2023-03-20
Payer: MEDICARE

## 2023-03-20 VITALS — BODY MASS INDEX: 34.49 KG/M2 | WEIGHT: 207.25 LBS

## 2023-03-20 DIAGNOSIS — Z87.442 HISTORY OF NEPHROLITHIASIS: ICD-10-CM

## 2023-03-20 DIAGNOSIS — Z84.1 FAMILY HISTORY OF NEPHROLITHIASIS: ICD-10-CM

## 2023-03-20 DIAGNOSIS — R31.0 GROSS HEMATURIA: Primary | ICD-10-CM

## 2023-03-20 PROCEDURE — 99204 OFFICE O/P NEW MOD 45 MIN: CPT | Mod: S$GLB,,, | Performed by: STUDENT IN AN ORGANIZED HEALTH CARE EDUCATION/TRAINING PROGRAM

## 2023-03-20 PROCEDURE — 3288F FALL RISK ASSESSMENT DOCD: CPT | Mod: CPTII,S$GLB,, | Performed by: STUDENT IN AN ORGANIZED HEALTH CARE EDUCATION/TRAINING PROGRAM

## 2023-03-20 PROCEDURE — 3288F PR FALLS RISK ASSESSMENT DOCUMENTED: ICD-10-PCS | Mod: CPTII,S$GLB,, | Performed by: STUDENT IN AN ORGANIZED HEALTH CARE EDUCATION/TRAINING PROGRAM

## 2023-03-20 PROCEDURE — 3061F NEG MICROALBUMINURIA REV: CPT | Mod: CPTII,S$GLB,, | Performed by: STUDENT IN AN ORGANIZED HEALTH CARE EDUCATION/TRAINING PROGRAM

## 2023-03-20 PROCEDURE — 87088 URINE BACTERIA CULTURE: CPT | Performed by: STUDENT IN AN ORGANIZED HEALTH CARE EDUCATION/TRAINING PROGRAM

## 2023-03-20 PROCEDURE — 99999 PR PBB SHADOW E&M-EST. PATIENT-LVL IV: CPT | Mod: PBBFAC,,, | Performed by: STUDENT IN AN ORGANIZED HEALTH CARE EDUCATION/TRAINING PROGRAM

## 2023-03-20 PROCEDURE — 1160F PR REVIEW ALL MEDS BY PRESCRIBER/CLIN PHARMACIST DOCUMENTED: ICD-10-PCS | Mod: CPTII,S$GLB,, | Performed by: STUDENT IN AN ORGANIZED HEALTH CARE EDUCATION/TRAINING PROGRAM

## 2023-03-20 PROCEDURE — 1160F RVW MEDS BY RX/DR IN RCRD: CPT | Mod: CPTII,S$GLB,, | Performed by: STUDENT IN AN ORGANIZED HEALTH CARE EDUCATION/TRAINING PROGRAM

## 2023-03-20 PROCEDURE — 99999 PR PBB SHADOW E&M-EST. PATIENT-LVL IV: ICD-10-PCS | Mod: PBBFAC,,, | Performed by: STUDENT IN AN ORGANIZED HEALTH CARE EDUCATION/TRAINING PROGRAM

## 2023-03-20 PROCEDURE — 3008F BODY MASS INDEX DOCD: CPT | Mod: CPTII,S$GLB,, | Performed by: STUDENT IN AN ORGANIZED HEALTH CARE EDUCATION/TRAINING PROGRAM

## 2023-03-20 PROCEDURE — 1159F MED LIST DOCD IN RCRD: CPT | Mod: CPTII,S$GLB,, | Performed by: STUDENT IN AN ORGANIZED HEALTH CARE EDUCATION/TRAINING PROGRAM

## 2023-03-20 PROCEDURE — 3008F PR BODY MASS INDEX (BMI) DOCUMENTED: ICD-10-PCS | Mod: CPTII,S$GLB,, | Performed by: STUDENT IN AN ORGANIZED HEALTH CARE EDUCATION/TRAINING PROGRAM

## 2023-03-20 PROCEDURE — 87147 CULTURE TYPE IMMUNOLOGIC: CPT | Performed by: STUDENT IN AN ORGANIZED HEALTH CARE EDUCATION/TRAINING PROGRAM

## 2023-03-20 PROCEDURE — 87086 URINE CULTURE/COLONY COUNT: CPT | Performed by: STUDENT IN AN ORGANIZED HEALTH CARE EDUCATION/TRAINING PROGRAM

## 2023-03-20 PROCEDURE — 1101F PT FALLS ASSESS-DOCD LE1/YR: CPT | Mod: CPTII,S$GLB,, | Performed by: STUDENT IN AN ORGANIZED HEALTH CARE EDUCATION/TRAINING PROGRAM

## 2023-03-20 PROCEDURE — 3044F PR MOST RECENT HEMOGLOBIN A1C LEVEL <7.0%: ICD-10-PCS | Mod: CPTII,S$GLB,, | Performed by: STUDENT IN AN ORGANIZED HEALTH CARE EDUCATION/TRAINING PROGRAM

## 2023-03-20 PROCEDURE — 99204 PR OFFICE/OUTPT VISIT, NEW, LEVL IV, 45-59 MIN: ICD-10-PCS | Mod: S$GLB,,, | Performed by: STUDENT IN AN ORGANIZED HEALTH CARE EDUCATION/TRAINING PROGRAM

## 2023-03-20 PROCEDURE — 1126F AMNT PAIN NOTED NONE PRSNT: CPT | Mod: CPTII,S$GLB,, | Performed by: STUDENT IN AN ORGANIZED HEALTH CARE EDUCATION/TRAINING PROGRAM

## 2023-03-20 PROCEDURE — 1101F PR PT FALLS ASSESS DOC 0-1 FALLS W/OUT INJ PAST YR: ICD-10-PCS | Mod: CPTII,S$GLB,, | Performed by: STUDENT IN AN ORGANIZED HEALTH CARE EDUCATION/TRAINING PROGRAM

## 2023-03-20 PROCEDURE — 1126F PR PAIN SEVERITY QUANTIFIED, NO PAIN PRESENT: ICD-10-PCS | Mod: CPTII,S$GLB,, | Performed by: STUDENT IN AN ORGANIZED HEALTH CARE EDUCATION/TRAINING PROGRAM

## 2023-03-20 PROCEDURE — 3061F PR NEG MICROALBUMINURIA RESULT DOCUMENTED/REVIEW: ICD-10-PCS | Mod: CPTII,S$GLB,, | Performed by: STUDENT IN AN ORGANIZED HEALTH CARE EDUCATION/TRAINING PROGRAM

## 2023-03-20 PROCEDURE — 3044F HG A1C LEVEL LT 7.0%: CPT | Mod: CPTII,S$GLB,, | Performed by: STUDENT IN AN ORGANIZED HEALTH CARE EDUCATION/TRAINING PROGRAM

## 2023-03-20 PROCEDURE — 3066F PR DOCUMENTATION OF TREATMENT FOR NEPHROPATHY: ICD-10-PCS | Mod: CPTII,S$GLB,, | Performed by: STUDENT IN AN ORGANIZED HEALTH CARE EDUCATION/TRAINING PROGRAM

## 2023-03-20 PROCEDURE — 3066F NEPHROPATHY DOC TX: CPT | Mod: CPTII,S$GLB,, | Performed by: STUDENT IN AN ORGANIZED HEALTH CARE EDUCATION/TRAINING PROGRAM

## 2023-03-20 PROCEDURE — 1159F PR MEDICATION LIST DOCUMENTED IN MEDICAL RECORD: ICD-10-PCS | Mod: CPTII,S$GLB,, | Performed by: STUDENT IN AN ORGANIZED HEALTH CARE EDUCATION/TRAINING PROGRAM

## 2023-03-20 NOTE — PROGRESS NOTES
Patient ID: Alvina Fisher is a 72 y.o. female.    Chief Complaint: Hematuria    Referral: Clement Phillips MD  [id number] [facility]  [place] [address]     HPI  72 y.o. who presents to the Urology clinic for evaluation of gross hematuria. Patient with personal hx of urolithiasis and FH of urolithiasis. Patient denies flank pain, dysuria, fevers, chills. Patient drinks 4 16 oz cups of water daily. Denies constipation. Former smoker. Has hx of lung cancer. There is FH of breast cancer. Has hx of POP. She takes vit D daily 1000U.        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 Arredondo 02/07/2020    Other specified glaucoma 02/07/2020    Chronic midline low back pain without sciatica hx of laminectomy L5S1; flexeril PRN 02/07/2020    Left lumbar radiculopathy from L sciatica, remote laminectomy but residual weakness/numbness L leg 02/07/2020    Hepatitis C antibody test positive but VL negative, either exposed/immune or false positive initial screening 02/17/2020    Type 2 diabetes mellitus with diabetic polyneuropathy, without long-term current use of insulin 06/25/2020    Hypothyroidism due to Hashimoto's thyroiditis 07/08/2020    Hyperplastic polyp of sigmoid colon 08/19/2020    Chronic superficial gastritis without bleeding on EGD 2017 08/19/2020    Dyslipidemia 09/21/2020    Fatty liver 02/16/2022    Obesity due to excess calories with serious comorbidity 02/16/2022    Osteopenia of multiple sites 3/2022; repeat 2024 04/01/2022    Chronic heart failure with preserved  ejection fraction 05/18/2022     Resolved Ambulatory Problems     Diagnosis Date Noted    Acute lateral meniscus tear of right knee 05/03/2019    Screening for osteoporosis 02/07/2020    Other specified hypothyroidism 07/08/2020     Past Medical History:   Diagnosis Date    Achilles tendon tear     Coronary artery disease     Diabetes mellitus     Dysplasia of cervix     GERD (gastroesophageal reflux disease)     Glaucoma (increased eye pressure)     Hypertension     Lung nodule     Sinusitis     Sleep apnea     Thyroid disease     Urinary incontinence          Past Surgical History  Past Surgical History:   Procedure Laterality Date    ACHILLES TENDON SURGERY      ARTHROSCOPIC CHONDROPLASTY OF KNEE JOINT Right 5/3/2019    Procedure: ARTHROSCOPY, KNEE, WITH CHONDROPLASTY;  Surgeon: Doug Alexander MD;  Location: [facility];  Service: Orthopedics;  Laterality: Right;    ARTHROSCOPY OF KNEE Right 5/3/2019    Procedure: ARTHROSCOPY, KNEE;  Surgeon: Doug Alexander MD;  Location: [facility];  Service: Orthopedics;  Laterality: Right;    CERVIX LESION DESTRUCTION      cryo x 2    CHOLECYSTECTOMY      EXCISION OF MEDIAL MENISCUS OF KNEE Right 5/3/2019    Procedure: MENISCECTOMY, KNEE, MEDIAL;  Surgeon: Doug Alexander MD;  Location: [facility];  Service: Orthopedics;  Laterality: Right;    HYSTERECTOMY      BLAKE/ovaries remain    INJECTION OF JOINT Right 5/3/2019    Procedure: INJECTION, JOINT - SYNVISE INJECTION;  Surgeon: Doug Alexander MD;  Location: [facility];  Service: Orthopedics;  Laterality: Right;  SYNVISE INJECTION FROM PHARMACY    LAMINECTOMY  1990    L5 S1    OOPHORECTOMY      TUBAL LIGATION         Social History  Social Connections: Unknown    Frequency of Communication with Friends and Family: More than three times a week    Frequency of Social Gatherings with Friends and Family: More than three times a week    Attends [employer] Services: Not on file    Active Member of Clubs or Organizations: Yes    Attends Club  or Organization Meetings: More than 4 times per year    Marital Status:        Medications    Current Outpatient Medications:     acetaminophen (TYLENOL) 500 MG tablet, Take 500 mg by mouth every 6 (six) hours as needed for Pain., Disp: , Rfl:     albuterol (VENTOLIN HFA) 90 mcg/actuation inhaler, Inhale 2 puffs into the lungs every 6 (six) hours as needed for Wheezing. Rescue, Disp: 18 g, Rfl: 1    aspirin (ECOTRIN) 81 MG EC tablet, , Disp: , Rfl:     brimonidine 0.2% (ALPHAGAN) 0.2 % Drop, , Disp: , Rfl:     diazePAM (VALIUM) 5 MG tablet, Take 1 tablet (5 mg total) by mouth nightly as needed for Anxiety., Disp: 14 tablet, Rfl: 0    dorzolamide-timolol 2-0.5% (COSOPT) 22.3-6.8 mg/mL ophthalmic solution, , Disp: , Rfl:     dulaglutide (TRULICITY) 0.75 mg/0.5 mL pen injector, Inject 0.75 mg into the skin every 7 days. For first month, Disp: 4 pen, Rfl: 0    dulaglutide (TRULICITY) 1.5 mg/0.5 mL pen injector, Inject 1.5 mg into the skin every 7 days. From month 2 onward, Disp: 4 pen, Rfl: 11    ENTRESTO 24-26 mg per tablet, Take 1 tablet by mouth 2 (two) times daily., Disp: , Rfl:     fluticasone propionate (FLONASE) 50 mcg/actuation nasal spray, 1 spray (50 mcg total) by Each Nostril route once daily., Disp: 16 g, Rfl: 3    furosemide (LASIX) 20 MG tablet, Take 1 tablet (20 mg total) by mouth once daily., Disp: 90 tablet, Rfl: 3    gabapentin (NEURONTIN) 300 MG capsule, Take 1 capsule (300 mg total) by mouth every evening. Start with once nightly, Disp: 90 capsule, Rfl: 3    levothyroxine (SYNTHROID) 100 MCG tablet, Take 1 tablet (100 mcg total) by mouth before breakfast., Disp: 90 tablet, Rfl: 3    metFORMIN (GLUCOPHAGE-XR) 500 MG ER 24hr tablet, Take 2 tablets (1,000 mg total) by mouth 2 (two) times daily with meals., Disp: 360 tablet, Rfl: 3    metoprolol succinate (TOPROL-XL) 25 MG 24 hr tablet, Take 0.5 tablets (12.5 mg total) by mouth once daily., Disp: 90 tablet, Rfl: 3    rosuvastatin (CRESTOR) 10 MG  tablet, Take 1 tablet (10 mg total) by mouth nightly., Disp: 90 tablet, Rfl: 3    vitamin D (VITAMIN D3) 1000 units Tab, , Disp: , Rfl:     azelastine (ASTELIN) 137 mcg (0.1 %) nasal spray, 1 spray (137 mcg total) by Nasal route 2 (two) times daily., Disp: 30 mL, Rfl: 11    coenzyme Q10 200 mg capsule, Take 200 mg by mouth., Disp: , Rfl:     Allergies  Review of patient's allergies indicates:   Allergen Reactions    Oxycodone-acetaminophen Itching and Nausea Only     NOT ALLERGIC TO ACETAMINOPHEN, HAS TAKEN IT     Codeine Itching       Patient's PMH, FH, Social hx, Medications, allergies reviewed and updated as pertinent to today's visit    Objective:      Physical Exam  Constitutional:       Appearance: She is well-developed.   HENT:      Head: Normocephalic and atraumatic.   Eyes:      Conjunctiva/sclera: Conjunctivae normal.   Pulmonary:      Effort: Pulmonary effort is normal. No respiratory distress.   Abdominal:      General: Abdomen is flat. There is no distension.      Palpations: Abdomen is soft. There is no mass.      Tenderness: There is no abdominal tenderness. There is no right CVA tenderness, left CVA tenderness or guarding.   Skin:     General: Skin is warm.      Findings: No rash.   Neurological:      Mental Status: She is alert and oriented to person, place, and time.   Psychiatric:         Behavior: Behavior normal.             Assessment:       1. Gross hematuria    2. History of nephrolithiasis    3. Family history of nephrolithiasis          Plan:       Hematuria  Discussed differential  Given smoking hx recommend CTU and cystoscopy  Urine culture    Kidney stones  Discussed the etiology and management of urinary stone disease. Explained that stone disease can be multifactorial and can be secondary to genetics, low fluid intake/low urine volume, excess protein/sodium within one's diet, metabolic syndromes (ie DM, hypertension, gout, RTA), sequelae of UTI and certain medications.     Discussed  patient will need to adopt lifestyle changes to prevent stone recurrence. Discussed patient will need to drink enough water to make 2.5L of urine, limit excess sodium and protein in diet, increase fruits/vegetables in diet. Reviewed excess vitamin C supplementation can increase risk of formation of kidney stones. Discussed taking vit d QOD given stone hx

## 2023-03-21 ENCOUNTER — TELEPHONE (OUTPATIENT)
Dept: UROLOGY | Facility: CLINIC | Age: 73
End: 2023-03-21
Payer: MEDICARE

## 2023-03-21 DIAGNOSIS — R31.0 GROSS HEMATURIA: Primary | ICD-10-CM

## 2023-03-21 RX ORDER — AMPICILLIN 500 MG/1
500 CAPSULE ORAL 4 TIMES DAILY
Qty: 12 CAPSULE | Refills: 0 | Status: SHIPPED | OUTPATIENT
Start: 2023-03-21 | End: 2023-03-24

## 2023-03-21 NOTE — TELEPHONE ENCOUNTER
I spoke with the pt  and made him aware that the pt had an antibiotic sent into the pharmacy for her due to her urinary tract infection. The  verbalized under standing and stated he would let her know to  the prescription.

## 2023-03-22 LAB — BACTERIA UR CULT: ABNORMAL

## 2023-03-27 ENCOUNTER — TELEPHONE (OUTPATIENT)
Dept: UROLOGY | Facility: CLINIC | Age: 73
End: 2023-03-27
Payer: MEDICARE

## 2023-03-27 ENCOUNTER — HOSPITAL ENCOUNTER (OUTPATIENT)
Dept: RADIOLOGY | Facility: HOSPITAL | Age: 73
Discharge: HOME | End: 2023-03-27
Attending: STUDENT IN AN ORGANIZED HEALTH CARE EDUCATION/TRAINING PROGRAM
Payer: MEDICARE

## 2023-03-27 DIAGNOSIS — R31.0 GROSS HEMATURIA: ICD-10-CM

## 2023-03-27 PROBLEM — I70.0 ABDOMINAL AORTIC ATHEROSCLEROSIS: Status: ACTIVE | Noted: 2023-03-27

## 2023-03-27 PROCEDURE — 74178 CT ABD&PLV WO CNTR FLWD CNTR: CPT | Mod: 26,,, | Performed by: STUDENT IN AN ORGANIZED HEALTH CARE EDUCATION/TRAINING PROGRAM

## 2023-03-27 PROCEDURE — 74178 CT ABD&PLV WO CNTR FLWD CNTR: CPT | Mod: TC

## 2023-03-27 PROCEDURE — 25500020 PHARM REV CODE 255: Performed by: STUDENT IN AN ORGANIZED HEALTH CARE EDUCATION/TRAINING PROGRAM

## 2023-03-27 PROCEDURE — 74178 CT UROGRAM ABD PELVIS W WO: ICD-10-PCS | Mod: 26,,, | Performed by: STUDENT IN AN ORGANIZED HEALTH CARE EDUCATION/TRAINING PROGRAM

## 2023-03-27 RX ADMIN — IOHEXOL 125 ML: 350 INJECTION, SOLUTION INTRAVENOUS at 09:03

## 2023-03-27 NOTE — PROGRESS NOTES
Punctate right renal calculus.  No hydronephrosis or hydroureter.  No obvious cause for her hematuria.  Undergoing workup with urology  Incidental aortic atherosclerosis.  Coronary artery atherosclerosis.  Known - had workup with cards - coronary artery calcium scoring was done. On statin  No changes. Continue workup with urology.

## 2023-03-27 NOTE — TELEPHONE ENCOUNTER
Pt notified of cysto appt 4/24/23 @ 10:30am        ----- Message from Naina Rucker MD sent at 3/27/2023 10:36 AM CDT -----  Please arrange cystoscopy to complete hematuria work up

## 2023-04-24 ENCOUNTER — PROCEDURE VISIT (OUTPATIENT)
Dept: UROLOGY | Facility: CLINIC | Age: 73
End: 2023-04-24
Payer: MEDICARE

## 2023-04-24 ENCOUNTER — LAB VISIT (OUTPATIENT)
Dept: LAB | Facility: HOSPITAL | Age: 73
End: 2023-04-24
Attending: STUDENT IN AN ORGANIZED HEALTH CARE EDUCATION/TRAINING PROGRAM
Payer: MEDICARE

## 2023-04-24 DIAGNOSIS — R31.0 GROSS HEMATURIA: Primary | ICD-10-CM

## 2023-04-24 DIAGNOSIS — R31.0 GROSS HEMATURIA: ICD-10-CM

## 2023-04-24 DIAGNOSIS — R35.0 URINARY FREQUENCY: Primary | ICD-10-CM

## 2023-04-24 LAB
BILIRUB SERPL-MCNC: NEGATIVE MG/DL
BLOOD URINE, POC: NORMAL
COLOR, POC UA: YELLOW
GLUCOSE UR QL STRIP: NORMAL
KETONES UR QL STRIP: NEGATIVE
LEUKOCYTE ESTERASE URINE, POC: NORMAL
NITRITE, POC UA: NEGATIVE
PH, POC UA: 5
PROTEIN, POC: NEGATIVE
SPECIFIC GRAVITY, POC UA: 1025
UROBILINOGEN, POC UA: NORMAL

## 2023-04-24 PROCEDURE — 87088 URINE BACTERIA CULTURE: CPT | Performed by: STUDENT IN AN ORGANIZED HEALTH CARE EDUCATION/TRAINING PROGRAM

## 2023-04-24 PROCEDURE — 81001 POCT URINALYSIS, DIPSTICK OR TABLET REAGENT, AUTOMATED, WITH MICROSCOP: ICD-10-PCS | Mod: S$GLB,,, | Performed by: STUDENT IN AN ORGANIZED HEALTH CARE EDUCATION/TRAINING PROGRAM

## 2023-04-24 PROCEDURE — 52000 CYSTOSCOPY: ICD-10-PCS | Mod: S$GLB,,, | Performed by: STUDENT IN AN ORGANIZED HEALTH CARE EDUCATION/TRAINING PROGRAM

## 2023-04-24 PROCEDURE — 87086 URINE CULTURE/COLONY COUNT: CPT | Performed by: STUDENT IN AN ORGANIZED HEALTH CARE EDUCATION/TRAINING PROGRAM

## 2023-04-24 PROCEDURE — 87147 CULTURE TYPE IMMUNOLOGIC: CPT | Performed by: STUDENT IN AN ORGANIZED HEALTH CARE EDUCATION/TRAINING PROGRAM

## 2023-04-24 PROCEDURE — 81001 URINALYSIS AUTO W/SCOPE: CPT | Mod: S$GLB,,, | Performed by: STUDENT IN AN ORGANIZED HEALTH CARE EDUCATION/TRAINING PROGRAM

## 2023-04-24 PROCEDURE — 52000 CYSTOURETHROSCOPY: CPT | Mod: S$GLB,,, | Performed by: STUDENT IN AN ORGANIZED HEALTH CARE EDUCATION/TRAINING PROGRAM

## 2023-04-24 NOTE — PROCEDURES
Cystoscopy    Date/Time: 4/24/2023 10:45 AM  Performed by: Naina Rucker MD  Authorized by: Naina Rucker MD     Consent Done?:  Yes (Written)  Timeout: prior to procedure the correct patient, procedure, and site was verified    Prep: patient was prepped and draped in usual sterile fashion    Local anesthesia used?: Yes    Local anesthetic:  Topical anesthetic  Indications: hematuria    Position:  Supine  Preparation: Patient was prepped and draped in usual sterile fashion    Scope type:  Flexible cystoscope  Urethra normal: Yes    Bladder neck normal: Yes    Bladder normal: Yes     patient tolerated the procedure well with no immediate complications  Comments:      No masses, patches, bilateral U.O effluxing clear yellow urine  Discussed PFPT for urinary urgency  RTC 6 months

## 2023-04-25 ENCOUNTER — TELEPHONE (OUTPATIENT)
Dept: UROLOGY | Facility: CLINIC | Age: 73
End: 2023-04-25
Payer: MEDICARE

## 2023-04-25 DIAGNOSIS — R35.0 URINARY FREQUENCY: Primary | ICD-10-CM

## 2023-04-25 RX ORDER — CEPHALEXIN 500 MG/1
500 CAPSULE ORAL EVERY 12 HOURS
Qty: 10 CAPSULE | Refills: 0 | Status: SHIPPED | OUTPATIENT
Start: 2023-04-25 | End: 2023-04-30

## 2023-04-25 NOTE — TELEPHONE ENCOUNTER
I LM for pt to let her know antibiotics were sent into the pharmacy    ----- Message from Naina Rucker MD sent at 4/25/2023 10:22 AM CDT -----  Please notify patient I have sent antibiotics   [place] collar has been placed on pt

## 2023-04-26 ENCOUNTER — CLINICAL SUPPORT (OUTPATIENT)
Dept: REHABILITATION | Facility: HOSPITAL | Age: 73
End: 2023-04-26
Attending: STUDENT IN AN ORGANIZED HEALTH CARE EDUCATION/TRAINING PROGRAM
Payer: MEDICARE

## 2023-04-26 DIAGNOSIS — N81.89 PELVIC FLOOR WEAKNESS: Primary | ICD-10-CM

## 2023-04-26 DIAGNOSIS — R27.9 LACK OF COORDINATION: ICD-10-CM

## 2023-04-26 DIAGNOSIS — R31.0 GROSS HEMATURIA: ICD-10-CM

## 2023-04-26 DIAGNOSIS — R35.0 URINARY FREQUENCY: ICD-10-CM

## 2023-04-26 LAB — BACTERIA UR CULT: ABNORMAL

## 2023-04-26 PROCEDURE — 97162 PT EVAL MOD COMPLEX 30 MIN: CPT | Mod: PN

## 2023-04-26 PROCEDURE — 97530 THERAPEUTIC ACTIVITIES: CPT | Mod: PN

## 2023-04-26 NOTE — PROGRESS NOTES
[facility]   Pelvic Health Physical Therapy Initial Evaluation     Date: 4/26/2023   Name: Alvina Fisher  Clinic Number: [id number]    Therapy Diagnosis:   Encounter Diagnoses   Name Primary?    Gross hematuria     Urinary frequency     Pelvic floor weakness Yes    Lack of coordination      Physician: Naina Rucker MD    Physician Orders: PT Eval and Treat   Medical Diagnosis from Referral: gross hematuria and urinary frequency  Evaluation Date: 4/26/2023  Authorization Period Expiration: 12/31/2023  Plan of Care Expiration: 7/26/2023  Progress Note Due: 5/25/2023  Visit # / Visits authorized: 1/ 1   FOTO: Issued Visit #: 1     Precautions: Standard and Diabetes    Time In: 10:10  Time Out: 11:05  Total Appointment Time (timed & untimed codes): 55 minutes    SUBJECTIVE     Date of onset: over 6 months ago    History of current condition - Alvina reports: has urge incontinence with turning faucet on.    OB/GYN History: has had 3 vaginal deliveries. Forceps for 1st delivery, had an episiotomy for 1st 2 deliveries. Had a hysterectomy in 1984.   Sexually active? Yes  Pain with vaginal exams, intercourse or tampon use? No    Bladder/Bowel History:   Frequency of urination:   Daytime: 5 times           Nighttime: 0  Difficulty initiating urine stream: No  Urine stream: weak  Complete emptying: No  Bladder leakage: Yes, had urge and stress incontinence  Frequency of incidents: 1-2 times per week  Amount leaked (urine): few drops, teaspoon(s), and small squirt   Urinary Urgency: Yes, Able to delay the urge for at least 30 minutes when sitting and only a few minutes when standing.  Frequency of bowel movements: once every 1-2 days  Difficulty initiating BM: No  Quality/Shape of BM:  soft solid  Does Patient Feel Empty after BM? No  Fiber Supplements or Laxative Use? No  Colon leakage: No  Frequency of incidents: none   Amount leaked (bowels):  none  Form of protection: panty liner does not  "wear one every day  Number of pads required in 24 hours: 1, is usually not wet    Pain:  Location: back pain of left side  Current 0/10, worst 9/10, best 0/10       Imaging CT scan films, cystoscope: kidney stone    Prior Therapy: not for this condition  Social History:  lives with their spouse  Current exercise: walking  Occupation: Patient does not work.   Prior Level of Function: independent  Current Level of Function: independent    Types of fluid intake:   Diet: no major restrictions   Habitus: overweight  Abuse/Neglect: No     Patients goals: "to not have as much urgency, to lose weight, and to have more control of bladder."     Comments: has bilateral shoulder issues     Medical History: Alvina  has a past medical history of Achilles tendon tear, Coronary artery disease, Diabetes mellitus, Dysplasia of cervix, GERD (gastroesophageal reflux disease), Glaucoma (increased eye pressure), Hypertension, Lung nodule, Sinusitis, Sleep apnea, Thyroid disease, and Urinary incontinence.     Surgical History: Alvina Fisher  has a past surgical history that includes Achilles tendon surgery; Tubal ligation; Laminectomy (1990); Cholecystectomy; Cervix lesion destruction; Hysterectomy; Oophorectomy; Arthroscopy of knee (Right, 5/3/2019); Injection of joint (Right, 5/3/2019); Excision of medial meniscus of knee (Right, 5/3/2019); and Arthroscopic chondroplasty of knee joint (Right, 5/3/2019).    Medications: Alvina has a current medication list which includes the following prescription(s): acetaminophen, albuterol, aspirin, azelastine, brimonidine 0.2%, cephalexin, diazepam, dorzolamide-timolol 2-0.5%, trulicity, entresto, furosemide, levothyroxine, metformin, rosuvastatin, and vitamin d.    Allergies:   Review of patient's allergies indicates:   Allergen Reactions    Oxycodone-acetaminophen Itching and Nausea Only     NOT ALLERGIC TO ACETAMINOPHEN, HAS TAKEN IT     Codeine Itching        OBJECTIVE     See EMR under MEDIA " for written consent provided 4/26/2023  Chaperone: declined    ORTHO SCREEN  Posture in sitting: sits squarely   Posture in standing: forward and rounded shoulders , increased kyphosis, and knee varus  Pelvic alignment: Not assessed today    SI Joint Palpation: Denies tenderness to SI joint palpation bilaterally.  Sacral spring test: NT (Positive=NO spring)  Adductor Palpation: increased tension     ABDOMINAL WALL ASSESSMENT  Palpation: tender superior abdomen and urgency with bladder pressure. Significant rib flaring and increased rib angle.   Abdominal strength: Rectus abdominus: 3-/5     Transverse abdominus: 3-/5  Scarring: vertical scar from PS to umbilicus with good movement, multiple abdominal scars  Pelvic Floor Muscle and Transverse Abdominus Synergy: absent  Diastasis: absent      BREATHING MECHANICS ASSESSMENT   Thorax Assessment During Quiet Respiration: Decreased excursion of abdominal wall   Thorax Assessment During Deep Respiration: Excessive excursion of lower ribs      Limitation/Restriction for FOTO Survey    Therapist reviewed FOTO scores for Alvina GREER Phillip on 4/26/2023.   FOTO documents entered into [facility] [employer] - see Media section.    Limitation Score:   PFDI Urinary 4%  Urinary Problem 62%       TREATMENT     Total Treatment time (time-based codes) separate from Evaluation: 25 minutes     Therapeutic Activity to improve dynamic functional performance for 25 minutes including:  Bladder scheduling and diaphragmatic breathing       PATIENT EDUCATION AND HOME EXERCISES     Education provided:   general anatomy/physiology of urinary/ bowel  system and benefits of treatment were discussed with the patient. Additionally, anatomy/physiology of pelvic floor, posture/body mechanices, bladder retraining, diaphragmatic breathing, double voiding techniques, fluid intake/dietary modifications, and behavior modifications were reviewed.     Written Home Exercises provided: yes.  Exercises were reviewed and Alvina  was able to demonstrate them prior to the end of the session. Alvina demonstrated good  understanding of the education provided. See EMR under Patient Instructions for exercises provided during therapy sessions.    ASSESSMENT     Alvina is a 72 y.o. female referred to outpatient Physical Therapy with a medical diagnosis of gross hematuria and urinary frequency. Pt presents with decreased muscle strength and impaired motor coordination.    Patient prognosis is Good.   Patient will benefit from skilled outpatient Physical Therapy to address the deficits stated above and in the chart below, provide patient/family education, and to maximize patient's level of independence.     Plan of care discussed with patient: Yes  Patient's spiritual, cultural and educational needs considered and patient is agreeable to the plan of care and goals as stated below:     Anticipated Barriers for therapy: scheduling    Medical Necessity is demonstrated by the following:  History  Co-morbidities and personal factors that may impact the plan of care Co-morbidities   advanced age, CAD, difficulty sleeping, HTN, prior abdominal surgery, prior lumbar surgery, and prior pelvic surgery    Personal Factors  no deficits     moderate   Examination  Body structures and functions, activity limitations and participation restrictions that may impact the plan of care Body Regions/Systems/Functions:  altered posture, adhered abdominal scar, decreased pelvic muscle strength, decreased endurance of the pelvic muscles, decreased phasic ability of the pelvic muscles, increased tension of the pelvic muscles, poor quality of pelvic muscle contraction, poor coordination of pelvic floor muscles during ADL's leading to urinary or fecal leakage, dysfunctional voiding, and unable to co-contract or co-relax abdominal wall and pelvic floor muscles     Activity Limitations:  urgency , delaying urge to urinate, full bladder emptying, incontinence with ADLs, and  "bathroom mapping    Participation Restrictions:  all ADLs/iADLs uninterrupted by urinary incontinence/urgency/frequency, ADLs affected by inability to fully empty bladder , Sleep restrictions, and exercise restrictions due to incontinence     Activity limitations:   Learning and applying knowledge  no deficits    General Tasks and Commands  no deficits    Communication  no deficits    Mobility  no deficits    Self care  no deficits    Domestic Life  no deficits    Interactions/Relationships  no deficits    Life Areas  no deficits    Community and Social Life  no deficits       moderate   Clinical Presentation evolving clinical presentation with changing clinical characteristics moderate   Decision Making/ Complexity Score: moderate     Goals:  Short Term Goals: 6 weeks   - Patient to be educated on pelvic muscle bracing and be able to consistently perform correctly and quickly to help decrease incontinence with cough/laugh/sneeze.  - Patient to perform "the knack" prior to coughing, laughing or sneezing to decrease risk of incontinence.  - Patient to demonstrate being able to correctly and consistently perform a kegel which is needed to increase pelvic floor muscle coordination and strength needed for continence.  - Patient to be able to delay the urge to urinate at least 5 minutes with a strong urge to urinate in order to make it to the bathroom without leaking.  - Patient to report being able to transfer from sitting to standing without leakage of urine.        Long Term Goals: 12 weeks  - Patient to be discharged with home plan for carry over after discharge.    - Patient to be able to perform a 8 second kegel x 10 reps to demonstrate improving strength and endurance needed for continence.  - Patient to report a decrease in pad usage to 0 pads a day to demonstrate improving pelvic floor muscle controls as evidenced by decreased episodes of incontinence needed to improve confidence in social situations.  - Patient " to no longer need to wear a pad for protection due to reduction of urinary incontinence by at least 80% with ADLs.  - Patient to be able to delay the urge to urinate at least 15 minutes with a strong urge to urinate in order to make it to the bathroom without leaking.  - Patient to report elimination of incontinence with ADLs to demonstrate improved pelvic floor muscle strength and coordination.  - Patient to increase pelvic floor strength to at least 4/5 to demonstrate improved strength needed for continence with ADLs.       PLAN     Plan of care Certification: 4/26/2023 to 7/26/2023.    Outpatient Physical Therapy 1-2 time(s) every 1 week(s) for 8-12 weeks to include the following interventions: Manual Therapy, Moist Heat/ Ice, Neuromuscular Re-ed, Patient Education, Therapeutic Activities, and Therapeutic Exercise.     Ev Palma, PT      I CERTIFY THE NEED FOR THESE SERVICES FURNISHED UNDER THIS PLAN OF TREATMENT AND WHILE UNDER MY CARE   Physician's comments:     Physician's Signature: ___________________________________________________

## 2023-04-27 PROBLEM — R27.9 LACK OF COORDINATION: Status: ACTIVE | Noted: 2023-04-27

## 2023-04-27 PROBLEM — R35.0 URINARY FREQUENCY: Status: ACTIVE | Noted: 2023-04-27

## 2023-04-27 PROBLEM — N81.89 PELVIC FLOOR WEAKNESS: Status: ACTIVE | Noted: 2023-04-27

## 2023-04-27 PROBLEM — R31.0 GROSS HEMATURIA: Status: ACTIVE | Noted: 2023-04-27

## 2023-04-27 NOTE — PATIENT INSTRUCTIONS
Home Exercise Program: 04/27/2023    DIAPHRAGMATIC BREATHING     The diaphragm is a dome shaped muscle that forms the floor of the rib cage. It is the most efficient muscle for breathing and relaxation, although most people are not used to using the diaphragm. Diaphragmatic or belly breathing is an important technique to learn because it helps settle down or relax the autonomic nervous system. The correct use of diaphragmatic breathing can help to quiet brain activity resulting in the relaxation of all the muscles and organs of the body. This is accomplished by slow rhythmic breathing concentrated in the diaphragm muscle rather than the chest.    How to do proper relaxation breathing:    Start by lying on your back or reclining in a chair in a relaxed position. Place one hand on your chest and the other on your abdomen.  Relax your jaw by placing your tongue on the floor of your mouth and keeping your teeth slightly apart.   Take a deep breath in, letting the abdomen expand and rise while you keep your upper chest, neck and shoulders relaxed.   As you breathe out, allow your abdomen and chest to fall. Exhale completely.  It doesn't matter if you breathe in/out through your nose and/or mouth. Do whichever feels comfortable.  Remember to breathe slowly.  Do not force your breathing. Do not hold your breath.  Repeat for 5 minutes every day.      BLADDER RETRAINING    The goal of bladder retraining is to return you to a more normal and convenient pattern of urinating. People who experience urinary urgency, frequency, excessive nighttime urinating and urinary leakage can show improvement with this retraining technique. Bladder retraining helps restore your bladder capacity to normal. The program includes education about bladder function, urge control, record keeping, and following a schedule of urinating (voluntarily emptying your bladder). The success of the program depends on your effort to consistently keep a specific  schedule and to have follow-up appointments with your health care provider.    THE RETRAINING PROGRAM  Each morning upon arising, go to the toilet and completely empty your bladder.  Your voiding schedule will begin upon getting out of bed in the morning and end at when going to sleep at bedtime.   Your voiding schedule is every 4 hours.  Follow this interval as closely as possible. The important part of the retraining is that you practice telling your bladder when to empty and when to hold.  Go to the toilet at the scheduled time even if you do not feel the need to urinate. The amount you urinate is not important. It is important to relax and not strain or push while voiding.  If you feel the need to urinate before the scheduled time, use urge delay techniques.  If you have to interrupt the schedule, get back on schedule at the assigned time for the next void.    PROGRESSING THE PROGRAM  The goal is to go 3-4 hours between urinating.  You will change the time between urination by minute/hour intervals.

## 2023-04-27 NOTE — PLAN OF CARE
[facility]   Pelvic Health Physical Therapy Initial Evaluation     Date: 4/26/2023   Name: Alvina Fisher  Clinic Number: [id number]    Therapy Diagnosis:   Encounter Diagnoses   Name Primary?    Gross hematuria     Urinary frequency     Pelvic floor weakness Yes    Lack of coordination      Physician: Naina Rucker MD    Physician Orders: PT Eval and Treat   Medical Diagnosis from Referral: gross hematuria and urinary frequency  Evaluation Date: 4/26/2023  Authorization Period Expiration: 12/31/2023  Plan of Care Expiration: 7/26/2023  Progress Note Due: 5/25/2023  Visit # / Visits authorized: 1/ 1   FOTO: Issued Visit #: 1     Precautions: Standard and Diabetes    Time In: 10:10  Time Out: 11:05  Total Appointment Time (timed & untimed codes): 55 minutes    SUBJECTIVE     Date of onset: over 6 months ago    History of current condition - Alvina reports: has urge incontinence with turning faucet on.    OB/GYN History: has had 3 vaginal deliveries. Forceps for 1st delivery, had an episiotomy for 1st 2 deliveries. Had a hysterectomy in 1984.   Sexually active? Yes  Pain with vaginal exams, intercourse or tampon use? No    Bladder/Bowel History:   Frequency of urination:   Daytime: 5 times           Nighttime: 0  Difficulty initiating urine stream: No  Urine stream: weak  Complete emptying: No  Bladder leakage: Yes, had urge and stress incontinence  Frequency of incidents: 1-2 times per week  Amount leaked (urine): few drops, teaspoon(s), and small squirt   Urinary Urgency: Yes, Able to delay the urge for at least 30 minutes when sitting and only a few minutes when standing.  Frequency of bowel movements: once every 1-2 days  Difficulty initiating BM: No  Quality/Shape of BM: soft solid  Does Patient Feel Empty after BM? No  Fiber Supplements or Laxative Use? No  Colon leakage: No  Frequency of incidents: none   Amount leaked (bowels): none  Form of protection: panty liner does not  "wear one every day  Number of pads required in 24 hours: 1, is usually not wet    Pain:  Location: back pain of left side  Current 0/10, worst 9/10, best 0/10       Imaging CT scan films, cystoscope: kidney stone    Prior Therapy: not for this condition  Social History:  lives with their spouse  Current exercise: walking  Occupation: Patient does not work.   Prior Level of Function: independent  Current Level of Function: independent    Types of fluid intake:   Diet: no major restrictions   Habitus: overweight  Abuse/Neglect: No     Patients goals: "to not have as much urgency, to lose weight, and to have more control of bladder."     Comments: has bilateral shoulder issues     Medical History: Alvina  has a past medical history of Achilles tendon tear, Coronary artery disease, Diabetes mellitus, Dysplasia of cervix, GERD (gastroesophageal reflux disease), Glaucoma (increased eye pressure), Hypertension, Lung nodule, Sinusitis, Sleep apnea, Thyroid disease, and Urinary incontinence.     Surgical History: Alvina Fisher  has a past surgical history that includes Achilles tendon surgery; Tubal ligation; Laminectomy (1990); Cholecystectomy; Cervix lesion destruction; Hysterectomy; Oophorectomy; Arthroscopy of knee (Right, 5/3/2019); Injection of joint (Right, 5/3/2019); Excision of medial meniscus of knee (Right, 5/3/2019); and Arthroscopic chondroplasty of knee joint (Right, 5/3/2019).    Medications: Alvina has a current medication list which includes the following prescription(s): acetaminophen, albuterol, aspirin, azelastine, brimonidine 0.2%, cephalexin, diazepam, dorzolamide-timolol 2-0.5%, trulicity, entresto, furosemide, levothyroxine, metformin, rosuvastatin, and vitamin d.    Allergies:   Review of patient's allergies indicates:   Allergen Reactions    Oxycodone-acetaminophen Itching and Nausea Only     NOT ALLERGIC TO ACETAMINOPHEN, HAS TAKEN IT     Codeine Itching        OBJECTIVE     See EMR under MEDIA " for written consent provided 4/26/2023  Chaperone: declined    ORTHO SCREEN  Posture in sitting: sits squarely   Posture in standing: forward and rounded shoulders , increased kyphosis, and knee varus  Pelvic alignment: Not assessed today    SI Joint Palpation: Denies tenderness to SI joint palpation bilaterally.  Sacral spring test: NT (Positive=NO spring)  Adductor Palpation: increased tension     ABDOMINAL WALL ASSESSMENT  Palpation: tender superior abdomen and urgency with bladder pressure. Significant rib flaring and increased rib angle.   Abdominal strength: Rectus abdominus: 3-/5     Transverse abdominus: 3-/5  Scarring: vertical scar from PS to umbilicus with good movement, multiple abdominal scars  Pelvic Floor Muscle and Transverse Abdominus Synergy: absent  Diastasis: absent      BREATHING MECHANICS ASSESSMENT   Thorax Assessment During Quiet Respiration: Decreased excursion of abdominal wall   Thorax Assessment During Deep Respiration: Excessive excursion of lower ribs      Limitation/Restriction for FOTO Survey    Therapist reviewed FOTO scores for Alvina GREER Phillip on 4/26/2023.   FOTO documents entered into [employer] - see Media section.    Limitation Score:   PFDI Urinary 4%  Urinary Problem 62%       TREATMENT     Total Treatment time (time-based codes) separate from Evaluation: 25 minutes     Therapeutic Activity to improve dynamic functional performance for 25 minutes including:  Bladder scheduling and diaphragmatic breathing       PATIENT EDUCATION AND HOME EXERCISES     Education provided:   general anatomy/physiology of urinary/ bowel  system and benefits of treatment were discussed with the patient. Additionally, anatomy/physiology of pelvic floor, posture/body mechanices, bladder retraining, diaphragmatic breathing, double voiding techniques, fluid intake/dietary modifications, and behavior modifications were reviewed.     Written Home Exercises provided: yes.  Exercises were reviewed and Alvina  was able to demonstrate them prior to the end of the session. Alvina demonstrated good  understanding of the education provided. See EMR under Patient Instructions for exercises provided during therapy sessions.    ASSESSMENT     Alvina is a 72 y.o. female referred to outpatient Physical Therapy with a medical diagnosis of gross hematuria and urinary frequency. Pt presents with decreased muscle strength and impaired motor coordination.    Patient prognosis is Good.   Patient will benefit from skilled outpatient Physical Therapy to address the deficits stated above and in the chart below, provide patient/family education, and to maximize patient's level of independence.     Plan of care discussed with patient: Yes  Patient's spiritual, cultural and educational needs considered and patient is agreeable to the plan of care and goals as stated below:     Anticipated Barriers for therapy: scheduling    Medical Necessity is demonstrated by the following:  History  Co-morbidities and personal factors that may impact the plan of care Co-morbidities   advanced age, CAD, difficulty sleeping, HTN, prior abdominal surgery, prior lumbar surgery, and prior pelvic surgery    Personal Factors  no deficits     moderate   Examination  Body structures and functions, activity limitations and participation restrictions that may impact the plan of care Body Regions/Systems/Functions:  altered posture, adhered abdominal scar, decreased pelvic muscle strength, decreased endurance of the pelvic muscles, decreased phasic ability of the pelvic muscles, increased tension of the pelvic muscles, poor quality of pelvic muscle contraction, poor coordination of pelvic floor muscles during ADL's leading to urinary or fecal leakage, dysfunctional voiding, and unable to co-contract or co-relax abdominal wall and pelvic floor muscles     Activity Limitations:  urgency , delaying urge to urinate, full bladder emptying, incontinence with ADLs, and  "bathroom mapping    Participation Restrictions:  all ADLs/iADLs uninterrupted by urinary incontinence/urgency/frequency, ADLs affected by inability to fully empty bladder , Sleep restrictions, and exercise restrictions due to incontinence     Activity limitations:   Learning and applying knowledge  no deficits    General Tasks and Commands  no deficits    Communication  no deficits    Mobility  no deficits    Self care  no deficits    Domestic Life  no deficits    Interactions/Relationships  no deficits    Life Areas  no deficits    Community and Social Life  no deficits       moderate   Clinical Presentation evolving clinical presentation with changing clinical characteristics moderate   Decision Making/ Complexity Score: moderate     Goals:  Short Term Goals: 6 weeks   - Patient to be educated on pelvic muscle bracing and be able to consistently perform correctly and quickly to help decrease incontinence with cough/laugh/sneeze.  - Patient to perform "the knack" prior to coughing, laughing or sneezing to decrease risk of incontinence.  - Patient to demonstrate being able to correctly and consistently perform a kegel which is needed to increase pelvic floor muscle coordination and strength needed for continence.  - Patient to be able to delay the urge to urinate at least 5 minutes with a strong urge to urinate in order to make it to the bathroom without leaking.  - Patient to report being able to transfer from sitting to standing without leakage of urine.        Long Term Goals: 12 weeks  - Patient to be discharged with home plan for carry over after discharge.    - Patient to be able to perform a 8 second kegel x 10 reps to demonstrate improving strength and endurance needed for continence.  - Patient to report a decrease in pad usage to 0 pads a day to demonstrate improving pelvic floor muscle controls as evidenced by decreased episodes of incontinence needed to improve confidence in social situations.  - Patient " to no longer need to wear a pad for protection due to reduction of urinary incontinence by at least 80% with ADLs.  - Patient to be able to delay the urge to urinate at least 15 minutes with a strong urge to urinate in order to make it to the bathroom without leaking.  - Patient to report elimination of incontinence with ADLs to demonstrate improved pelvic floor muscle strength and coordination.  - Patient to increase pelvic floor strength to at least 4/5 to demonstrate improved strength needed for continence with ADLs.       PLAN     Plan of care Certification: 4/26/2023 to 7/26/2023.    Outpatient Physical Therapy 1-2 time(s) every 1 week(s) for 8-12 weeks to include the following interventions: Manual Therapy, Moist Heat/ Ice, Neuromuscular Re-ed, Patient Education, Therapeutic Activities, and Therapeutic Exercise.     Ev Palma, PT      I CERTIFY THE NEED FOR THESE SERVICES FURNISHED UNDER THIS PLAN OF TREATMENT AND WHILE UNDER MY CARE   Physician's comments:     Physician's Signature: ___________________________________________________

## 2023-05-11 ENCOUNTER — CLINICAL SUPPORT (OUTPATIENT)
Dept: REHABILITATION | Facility: HOSPITAL | Age: 73
End: 2023-05-11
Attending: STUDENT IN AN ORGANIZED HEALTH CARE EDUCATION/TRAINING PROGRAM
Payer: MEDICARE

## 2023-05-11 DIAGNOSIS — R35.0 URINARY FREQUENCY: Primary | ICD-10-CM

## 2023-05-11 DIAGNOSIS — R27.9 LACK OF COORDINATION: ICD-10-CM

## 2023-05-11 DIAGNOSIS — R31.0 GROSS HEMATURIA: ICD-10-CM

## 2023-05-11 DIAGNOSIS — N81.89 PELVIC FLOOR WEAKNESS: ICD-10-CM

## 2023-05-11 PROCEDURE — 97112 NEUROMUSCULAR REEDUCATION: CPT | Mod: PN

## 2023-05-11 NOTE — PATIENT INSTRUCTIONS
Home Exercise Program: 04/27/2023     DIAPHRAGMATIC BREATHING        The diaphragm is a dome shaped muscle that forms the floor of the rib cage. It is the most efficient muscle for breathing and relaxation, although most people are not used to using the diaphragm. Diaphragmatic or belly breathing is an important technique to learn because it helps settle down or relax the autonomic nervous system. The correct use of diaphragmatic breathing can help to quiet brain activity resulting in the relaxation of all the muscles and organs of the body. This is accomplished by slow rhythmic breathing concentrated in the diaphragm muscle rather than the chest.     How to do proper relaxation breathing:       Start by lying on your back or reclining in a chair in a relaxed position. Place one hand on your chest and the other on your abdomen.  Relax your jaw by placing your tongue on the floor of your mouth and keeping your teeth slightly apart.   Take a deep breath in, letting the abdomen expand and rise while you keep your upper chest, neck and shoulders relaxed.   As you breathe out, allow your abdomen and chest to fall. Exhale completely.  It doesn't matter if you breathe in/out through your nose and/or mouth. Do whichever feels comfortable.  Remember to breathe slowly.  Do not force your breathing. Do not hold your breath.  Repeat for 5 minutes every day.        BLADDER RETRAINING     The goal of bladder retraining is to return you to a more normal and convenient pattern of urinating. People who experience urinary urgency, frequency, excessive nighttime urinating and urinary leakage can show improvement with this retraining technique. Bladder retraining helps restore your bladder capacity to normal. The program includes education about bladder function, urge control, record keeping, and following a schedule of urinating (voluntarily emptying your bladder). The success of the program depends on your effort to consistently keep  a specific schedule and to have follow-up appointments with your health care provider.     THE RETRAINING PROGRAM  Each morning upon arising, go to the toilet and completely empty your bladder.  Your voiding schedule will begin upon getting out of bed in the morning and end at when going to sleep at bedtime.   Your voiding schedule is every 4 hours.  Follow this interval as closely as possible. The important part of the retraining is that you practice telling your bladder when to empty and when to hold.  Go to the toilet at the scheduled time even if you do not feel the need to urinate. The amount you urinate is not important. It is important to relax and not strain or push while voiding.  If you feel the need to urinate before the scheduled time, use urge delay techniques.  If you have to interrupt the schedule, get back on schedule at the assigned time for the next void.     PROGRESSING THE PROGRAM  The goal is to go 3-4 hours between urinating.  You will change the time between urination by minute/hour intervals.             Kegels - start this laying down so that your pelvic floor doesn't have to work against gravity. Start by taking a deep breath in, then as you exhale draw your pelvic floor closed and lift like you were stopping the flow of urine (like you were picking up a marble.) Hold this contraction at 50% of your full force while you breathe out. Release to a fully relaxed position. Exhale like you are blowing out birthday candles while performing a Kegel to avoid holding your breath and increasing pressure on your abdomen/pelvic floor  Perform 3 sets of 10 reps, daily    Start 1st week laying down and then try sitting up the second week.       No Kegels while urinating!

## 2023-05-11 NOTE — PROGRESS NOTES
Pelvic Health Physical Therapy   Treatment Note     Name: Alvina Fisher  Clinic Number: [id number]    Therapy Diagnosis:   Encounter Diagnoses   Name Primary?    Urinary frequency Yes    Pelvic floor weakness     Gross hematuria     Lack of coordination      Physician: Naina Rucker MD    Visit Date: 5/11/2023    Physician Orders: Physical Therapy evaluate and treat  Medical Diagnosis: gross hematuria and urinary frequency  Evaluation Date: 4/26/2023  Authorization Period Expiration: 12/31/2023  Plan of Care Certification Period: 7/26/2023  Visit #/Visits authorized: 1/ 20 (+eval)       Time In: 2:01  Time Out: 2:30  Total Billable Time: 29 minutes    Precautions: Standard    Subjective     Pt reports: was put back on antibiotics because she was found to still have antibiotics. Only had one episode of leakage yesterday with urgency. Has not had any issues with leakage with turning on the faucet. Does take a diuretic a few times per week and will have increased urgency on the days she takes it. Has been going to the bathroom about every 3 hours during the day. Has not been wearing a panty liner this past week.     She was compliant with home exercise program.  Response to previous treatment: good  Functional change: decreased incontinence and decreased urgency with sink tasks    Pain: no reports    Objective     Alvina participated in neuromuscular re-education activities to develop Coordination, Control, and Down training for 29 minutes including: pelvic floor relaxation/bulging training, 360 breathing , and diaphragmatic breathing with Kegel  Done in sitting and in supine        Home Exercises Provided and Patient Education Provided     Education provided:   - anatomy/physiology of pelvic floor, posture/body mechanices, bladder retraining, diaphragmatic breathing, kegels, fluid intake/dietary modifications, and behavior modifications  Discussed progression of plan of care with patient; educated pt in  "activity modification; reviewed HEP with pt. Pt demonstrated and verbalized understanding of all instruction and was provided with a handout of HEP (see Patient Instructions).  - diaphragmatic breathing 5 minutes  - 3-4 hour bladder schedule  - x30 supine kegels daily sub maximal    Written Home Exercises Provided: yes.  Exercises were reviewed and Alvina was able to demonstrate them prior to the end of the session.  Alvina demonstrated good  understanding of the education provided.     See EMR under Patient Instructions for exercises provided 5/11/2023.    Assessment     Patient tolerated treatment well. Some difficulty with coordination of pelvic floor muscles contraction in sitting.     Alvina Is progressing well towards her goals.   Pt prognosis is Good.     Pt will continue to benefit from skilled outpatient physical therapy to address the deficits listed in the problem list box on initial evaluation, provide pt/family education and to maximize pt's level of independence in the home and community environment.     Pt's spiritual, cultural and educational needs considered and pt agreeable to plan of care and goals.     Anticipated barriers to physical therapy: scheduling    Goals:   Short Term Goals: 6 weeks   - Patient to be educated on pelvic muscle bracing and be able to consistently perform correctly and quickly to help decrease incontinence with cough/laugh/sneeze.  - Patient to perform "the knack" prior to coughing, laughing or sneezing to decrease risk of incontinence.  - Patient to demonstrate being able to correctly and consistently perform a kegel which is needed to increase pelvic floor muscle coordination and strength needed for continence.  - Patient to be able to delay the urge to urinate at least 5 minutes with a strong urge to urinate in order to make it to the bathroom without leaking.  - Patient to report being able to transfer from sitting to standing without leakage of urine.              "   Long Term Goals: 12 weeks  - Patient to be discharged with home plan for carry over after discharge.    - Patient to be able to perform a 8 second kegel x 10 reps to demonstrate improving strength and endurance needed for continence.  - Patient to report a decrease in pad usage to 0 pads a day to demonstrate improving pelvic floor muscle controls as evidenced by decreased episodes of incontinence needed to improve confidence in social situations.  - Patient to no longer need to wear a pad for protection due to reduction of urinary incontinence by at least 80% with ADLs.  - Patient to be able to delay the urge to urinate at least 15 minutes with a strong urge to urinate in order to make it to the bathroom without leaking.  - Patient to report elimination of incontinence with ADLs to demonstrate improved pelvic floor muscle strength and coordination.  - Patient to increase pelvic floor strength to at least 4/5 to demonstrate improved strength needed for continence with ADLs.       Plan     Plan to continue current plan of care and progress as tolerated.     Ev Palma, PT

## 2023-06-06 ENCOUNTER — CLINICAL SUPPORT (OUTPATIENT)
Dept: REHABILITATION | Facility: HOSPITAL | Age: 73
End: 2023-06-06
Attending: STUDENT IN AN ORGANIZED HEALTH CARE EDUCATION/TRAINING PROGRAM
Payer: MEDICARE

## 2023-06-06 DIAGNOSIS — R27.9 LACK OF COORDINATION: ICD-10-CM

## 2023-06-06 DIAGNOSIS — N81.89 PELVIC FLOOR WEAKNESS: ICD-10-CM

## 2023-06-06 DIAGNOSIS — R35.0 URINARY FREQUENCY: Primary | ICD-10-CM

## 2023-06-06 DIAGNOSIS — R31.0 GROSS HEMATURIA: ICD-10-CM

## 2023-06-06 PROCEDURE — 97010 HOT OR COLD PACKS THERAPY: CPT | Mod: PN

## 2023-06-06 PROCEDURE — 97140 MANUAL THERAPY 1/> REGIONS: CPT | Mod: PN
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
## 2023-06-06 NOTE — PATIENT INSTRUCTIONS
Home Exercise Program: 06/06/2023      CONTROLLING URINARY / FECAL URGENCY      WHEN YOU EXPERIENCE A STRONG URGE TO URINATE OR DEFECATE:    FIRST Stop activity, stand quietly or sit down. Try to stay very still to maintain control. Avoid rushing to the toilet.    SECOND Begin Quick Flicks (1 second LIFT of pelvic floor muscles, 4 second DROP). Pelvic floor contractions send a message to the bladder to relax and hold urine.     THIRD             Relax. Do not rush to the toilet. Take a deep belly or diaphragmatic breath and let it out slowly. Let the urge to urinate pass by using distraction techniques and positive thoughts. Try not to think about going to the bathroom.    FINALLY If the urge returns, repeat the above steps to regain control. When you feel the urge subside, walk normally to the bathroom. You can urinate once the urge has subsided.

## 2023-06-13 ENCOUNTER — CLINICAL SUPPORT (OUTPATIENT)
Dept: REHABILITATION | Facility: HOSPITAL | Age: 73
End: 2023-06-13
Attending: STUDENT IN AN ORGANIZED HEALTH CARE EDUCATION/TRAINING PROGRAM
Payer: MEDICARE

## 2023-06-13 DIAGNOSIS — N81.89 PELVIC FLOOR WEAKNESS: ICD-10-CM

## 2023-06-13 DIAGNOSIS — R31.0 GROSS HEMATURIA: ICD-10-CM

## 2023-06-13 DIAGNOSIS — R35.0 URINARY FREQUENCY: Primary | ICD-10-CM

## 2023-06-13 DIAGNOSIS — R27.9 LACK OF COORDINATION: ICD-10-CM

## 2023-06-13 PROCEDURE — 97140 MANUAL THERAPY 1/> REGIONS: CPT | Mod: PN

## 2023-06-13 PROCEDURE — 97010 HOT OR COLD PACKS THERAPY: CPT | Mod: PN

## 2023-06-13 NOTE — PROGRESS NOTES
Pelvic Health Physical Therapy   Treatment Note     Name: Alvina Fisher  Clinic Number: [id number]    Therapy Diagnosis:   Encounter Diagnoses   Name Primary?    Urinary frequency Yes    Pelvic floor weakness     Gross hematuria     Lack of coordination      Physician: Naina Rucker MD    Visit Date: 6/13/2023    Physician Orders: Physical Therapy evaluate and treat  Medical Diagnosis: gross hematuria and urinary frequency  Evaluation Date: 4/26/2023  Authorization Period Expiration: 12/31/2023  Plan of Care Certification Period: 7/26/2023  Visit #/Visits authorized: 3/ 20 (+eval)       Time In: 1:55  Time Out: 1:48  Total Billable Time: 53 minutes    Precautions: Standard    Subjective     Pt reports: Did not have any leakage incidents in the past few day. Reports she did not drink as much water the past few days because she had company. Reports that she is not having as much UTI symptoms. Has been needing to double void at night right after getting in the bed and feels this is because she is not emptying bladder fully. Reports the urine stream is weak and tries to push it out to hurry it up. Reports that she is still having urgency with hands under running water. Is not having as much amount of urine  leakage. No adverse reactions after last session.       She was compliant with home exercise program.  Response to previous treatment: good  Functional change: decreased incontinence and decreased urgency with sink tasks    Pain: no reports    Objective     Alvina received the following manual therapy techniques: to develop flexibility and extensibility for 43 minutes including: trigger point/myofascial release of inferior abdomen and visceral mobilization of external bladder    10 minutes hot pack to abdomen in supine    Home Exercises Provided and Patient Education Provided     Education provided:   - anatomy/physiology of pelvic floor, posture/body mechanices, bladder retraining, diaphragmatic breathing,  "kegels, fluid intake/dietary modifications, and behavior modifications  Discussed progression of plan of care with patient; educated pt in activity modification; reviewed HEP with pt. Pt demonstrated and verbalized understanding of all instruction and was provided with a handout of HEP (see Patient Instructions).  - diaphragmatic breathing 5 minutes  - 3-4 hour bladder schedule  - x30 supine kegels daily sub maximal  - bladder massage    Written Home Exercises Provided: yes.  Exercises were reviewed and Alvina was able to demonstrate them prior to the end of the session.  Alvina demonstrated good  understanding of the education provided.     See EMR under Patient Instructions for exercises provided 5/11/2023.    Assessment     Patient tolerated treatment well. Reports of pain and noted multiple large trigger points to inferior abdomen.    Alvina Is progressing well towards her goals.   Pt prognosis is Good.     Pt will continue to benefit from skilled outpatient physical therapy to address the deficits listed in the problem list box on initial evaluation, provide pt/family education and to maximize pt's level of independence in the home and community environment.     Pt's spiritual, cultural and educational needs considered and pt agreeable to plan of care and goals.     Anticipated barriers to physical therapy: scheduling    Goals:   Short Term Goals: 6 weeks   - Patient to be educated on pelvic muscle bracing and be able to consistently perform correctly and quickly to help decrease incontinence with cough/laugh/sneeze.  - Patient to perform "the knack" prior to coughing, laughing or sneezing to decrease risk of incontinence.  - Patient to demonstrate being able to correctly and consistently perform a kegel which is needed to increase pelvic floor muscle coordination and strength needed for continence.  - Patient to be able to delay the urge to urinate at least 5 minutes with a strong urge to urinate in order to " make it to the bathroom without leaking.  - Patient to report being able to transfer from sitting to standing without leakage of urine.                Long Term Goals: 12 weeks  - Patient to be discharged with home plan for carry over after discharge.    - Patient to be able to perform a 8 second kegel x 10 reps to demonstrate improving strength and endurance needed for continence.  - Patient to report a decrease in pad usage to 0 pads a day to demonstrate improving pelvic floor muscle controls as evidenced by decreased episodes of incontinence needed to improve confidence in social situations.  - Patient to no longer need to wear a pad for protection due to reduction of urinary incontinence by at least 80% with ADLs.  - Patient to be able to delay the urge to urinate at least 15 minutes with a strong urge to urinate in order to make it to the bathroom without leaking.  - Patient to report elimination of incontinence with ADLs to demonstrate improved pelvic floor muscle strength and coordination.  - Patient to increase pelvic floor strength to at least 4/5 to demonstrate improved strength needed for continence with ADLs.       Plan     Plan to continue current plan of care and progress as tolerated.     Ev Palma, PT

## 2023-06-26 ENCOUNTER — PATIENT MESSAGE (OUTPATIENT)
Dept: FAMILY MEDICINE | Facility: CLINIC | Age: 73
End: 2023-06-26
Payer: MEDICARE

## 2023-06-26 DIAGNOSIS — Z12.31 BREAST CANCER SCREENING BY MAMMOGRAM: Primary | ICD-10-CM

## 2023-06-27 ENCOUNTER — CLINICAL SUPPORT (OUTPATIENT)
Dept: REHABILITATION | Facility: HOSPITAL | Age: 73
End: 2023-06-27
Attending: STUDENT IN AN ORGANIZED HEALTH CARE EDUCATION/TRAINING PROGRAM
Payer: MEDICARE

## 2023-06-27 DIAGNOSIS — R27.9 LACK OF COORDINATION: ICD-10-CM

## 2023-06-27 DIAGNOSIS — N81.89 PELVIC FLOOR WEAKNESS: ICD-10-CM

## 2023-06-27 DIAGNOSIS — R35.0 URINARY FREQUENCY: Primary | ICD-10-CM

## 2023-06-27 DIAGNOSIS — R31.0 GROSS HEMATURIA: ICD-10-CM

## 2023-06-27 PROCEDURE — 97530 THERAPEUTIC ACTIVITIES: CPT | Mod: PN

## 2023-06-27 NOTE — PROGRESS NOTES
Pelvic Health Physical Therapy   Treatment Note and Discharge Note     Name: Alvina Fisher  Clinic Number: [id number]    Therapy Diagnosis:   Encounter Diagnoses   Name Primary?    Urinary frequency Yes    Pelvic floor weakness     Gross hematuria     Lack of coordination        Physician: Naina Rucker MD    Visit Date: 6/27/2023    Physician Orders: Physical Therapy evaluate and treat  Medical Diagnosis: gross hematuria and urinary frequency  Evaluation Date: 4/26/2023  Authorization Period Expiration: 12/31/2023  Plan of Care Certification Period: 7/26/2023  Visit #/Visits authorized: 4/ 20 (+eval)       Time In: 1:56  Time Out: 2:40  Total Billable Time: 46 minutes    Precautions: Standard    Subjective     Pt reports: Went on a trip. Did not have any leakage while she was on her trip. Today had a small amount of leakage with urgency with washing dishes. Has not been having as much nocturnal voids. Feels like she has improved 55% since starting Physical Therapy. Would want to not have any concerns about any leakage at all. Feels she is ready to DC to independent [facility] and wants today to be her last appointment.       She was compliant with home exercise program.  Response to previous treatment: good  Functional change: decreased incontinence and decreased urgency with sink tasks    Pain: no reports    Objective     Alvina participated in dynamic functional therapeutic activities to improve functional performance for 46 minutes, including:  Supine march progress to 90 degrees   Side-lying clamshells with yellow theraband  Seated abduction with yellow theraband  Progression of home exercise program.       Home Exercises Provided and Patient Education Provided     Education provided:   - anatomy/physiology of pelvic floor, posture/body mechanices, bladder retraining, diaphragmatic breathing, kegels, fluid intake/dietary modifications, and behavior modifications  Discussed progression of plan of care with  "patient; educated pt in activity modification; reviewed HEP with pt. Pt demonstrated and verbalized understanding of all instruction and was provided with a handout of HEP (see Patient Instructions).  - diaphragmatic breathing 5 minutes  - 3-4 hour bladder schedule  - x30 supine kegels daily sub maximal  - bladder massage  + Supine march progress to 90 degrees x30 3x/wk  + Side-lying clamshells x30 3x/wk  + Seated abduction x30 3x/wk    Written Home Exercises Provided: yes.  Exercises were reviewed and Alvina was able to demonstrate them prior to the end of the session.  Alvina demonstrated good  understanding of the education provided.     See EMR under Patient Instructions for exercises provided 5/11/2023.    Assessment     Patient tolerated treatment well.     Alvina Is progressing well towards her goals.   Pt prognosis is Good.     Pt's spiritual, cultural and educational needs considered and pt agreeable to plan of care and goals.     Anticipated barriers to physical therapy: scheduling    Goals:   Short Term Goals: 6 weeks   - Patient to be educated on pelvic muscle bracing and be able to consistently perform correctly and quickly to help decrease incontinence with cough/laugh/sneeze. MET  - Patient to perform "the knack" prior to coughing, laughing or sneezing to decrease risk of incontinence. MET  - Patient to demonstrate being able to correctly and consistently perform a kegel which is needed to increase pelvic floor muscle coordination and strength needed for continence. MET  - Patient to be able to delay the urge to urinate at least 5 minutes with a strong urge to urinate in order to make it to the bathroom without leaking. MET  - Patient to report being able to transfer from sitting to standing without leakage of urine. MET                  Long Term Goals: 12 weeks  - Patient to be discharged with home plan for carry over after discharge.  MET  - Patient to be able to perform a 8 second kegel x 10 reps " to demonstrate improving strength and endurance needed for continence. NOT MET  - Patient to report a decrease in pad usage to 0 pads a day to demonstrate improving pelvic floor muscle controls as evidenced by decreased episodes of incontinence needed to improve confidence in social situations. NOT MET  - Patient to no longer need to wear a pad for protection due to reduction of urinary incontinence by at least 80% with ADLs. MET  - Patient to be able to delay the urge to urinate at least 15 minutes with a strong urge to urinate in order to make it to the bathroom without leaking. NOT MET  - Patient to report elimination of incontinence with ADLs to demonstrate improved pelvic floor muscle strength and coordination. NOT MET  - Patient to increase pelvic floor strength to at least 4/5 to demonstrate improved strength needed for continence with ADLs. NOT MET      Plan     Plan to discharge to independent home exercise program.     Ev Palma, PT

## 2023-06-29 ENCOUNTER — HOSPITAL ENCOUNTER (OUTPATIENT)
Dept: RADIOLOGY | Facility: HOSPITAL | Age: 73
Discharge: HOME | End: 2023-06-29
Attending: INTERNAL MEDICINE
Payer: MEDICARE

## 2023-06-29 DIAGNOSIS — Z12.31 BREAST CANCER SCREENING BY MAMMOGRAM: ICD-10-CM

## 2023-06-29 PROCEDURE — 77067 SCR MAMMO BI INCL CAD: CPT | Mod: 26,,, | Performed by: RADIOLOGY

## 2023-06-29 PROCEDURE — 77063 BREAST TOMOSYNTHESIS BI: CPT | Mod: 26,,, | Performed by: RADIOLOGY

## 2023-06-29 PROCEDURE — 77063 MAMMO DIGITAL SCREENING BILAT WITH TOMO: ICD-10-PCS | Mod: 26,,, | Performed by: RADIOLOGY

## 2023-06-29 PROCEDURE — 77067 MAMMO DIGITAL SCREENING BILAT WITH TOMO: ICD-10-PCS | Mod: 26,,, | Performed by: RADIOLOGY

## 2023-06-29 PROCEDURE — 77067 SCR MAMMO BI INCL CAD: CPT | Mod: TC,PO

## 2023-08-29 ENCOUNTER — PATIENT MESSAGE (OUTPATIENT)
Dept: FAMILY MEDICINE | Facility: CLINIC | Age: 73
End: 2023-08-29
Payer: MEDICARE

## 2023-09-05 ENCOUNTER — LAB VISIT (OUTPATIENT)
Dept: LAB | Facility: HOSPITAL | Age: 73
End: 2023-09-05
Attending: INTERNAL MEDICINE
Payer: MEDICARE

## 2023-09-05 DIAGNOSIS — E11.42 TYPE 2 DIABETES MELLITUS WITH DIABETIC POLYNEUROPATHY, WITHOUT LONG-TERM CURRENT USE OF INSULIN: ICD-10-CM

## 2023-09-05 LAB
ALBUMIN SERPL BCP-MCNC: 3.9 G/DL (ref 3.5–5.2)
ALP SERPL-CCNC: 59 U/L (ref 55–135)
ALT SERPL W/O P-5'-P-CCNC: 31 U/L (ref 10–44)
ANION GAP SERPL CALC-SCNC: 9 MMOL/L (ref 8–16)
AST SERPL-CCNC: 27 U/L (ref 10–40)
BASOPHILS # BLD AUTO: 0.08 K/UL (ref 0–0.2)
BASOPHILS NFR BLD: 1.1 % (ref 0–1.9)
BILIRUB SERPL-MCNC: 0.5 MG/DL (ref 0.1–1)
BUN SERPL-MCNC: 14 MG/DL (ref 8–23)
CALCIUM SERPL-MCNC: 9.3 MG/DL (ref 8.7–10.5)
CHLORIDE SERPL-SCNC: 107 MMOL/L (ref 95–110)
CO2 SERPL-SCNC: 24 MMOL/L (ref 23–29)
CREAT SERPL-MCNC: 0.7 MG/DL (ref 0.5–1.4)
DIFFERENTIAL METHOD: ABNORMAL
EOSINOPHIL # BLD AUTO: 0.6 K/UL (ref 0–0.5)
EOSINOPHIL NFR BLD: 8.3 % (ref 0–8)
ERYTHROCYTE [DISTWIDTH] IN BLOOD BY AUTOMATED COUNT: 13.2 % (ref 11.5–14.5)
EST. GFR  (NO RACE VARIABLE): >60 ML/MIN/1.73 M^2
ESTIMATED AVG GLUCOSE: 117 MG/DL (ref 68–131)
GLUCOSE SERPL-MCNC: 117 MG/DL (ref 70–110)
HBA1C MFR BLD: 5.7 % (ref 4–5.6)
HCT VFR BLD AUTO: 41.4 % (ref 37–48.5)
HGB BLD-MCNC: 13.4 G/DL (ref 12–16)
IMM GRANULOCYTES # BLD AUTO: 0.01 K/UL (ref 0–0.04)
IMM GRANULOCYTES NFR BLD AUTO: 0.1 % (ref 0–0.5)
LYMPHOCYTES # BLD AUTO: 3.2 K/UL (ref 1–4.8)
LYMPHOCYTES NFR BLD: 45.3 % (ref 18–48)
MCH RBC QN AUTO: 29.8 PG (ref 27–31)
MCHC RBC AUTO-ENTMCNC: 32.4 G/DL (ref 32–36)
MCV RBC AUTO: 92 FL (ref 82–98)
MONOCYTES # BLD AUTO: 0.6 K/UL (ref 0.3–1)
MONOCYTES NFR BLD: 8.7 % (ref 4–15)
NEUTROPHILS # BLD AUTO: 2.6 K/UL (ref 1.8–7.7)
NEUTROPHILS NFR BLD: 36.5 % (ref 38–73)
NRBC BLD-RTO: 0 /100 WBC
PLATELET # BLD AUTO: 197 K/UL (ref 150–450)
PMV BLD AUTO: 11.3 FL (ref 9.2–12.9)
POTASSIUM SERPL-SCNC: 4 MMOL/L (ref 3.5–5.1)
PROT SERPL-MCNC: 6.9 G/DL (ref 6–8.4)
RBC # BLD AUTO: 4.49 M/UL (ref 4–5.4)
SODIUM SERPL-SCNC: 140 MMOL/L (ref 136–145)
WBC # BLD AUTO: 7.11 K/UL (ref 3.9–12.7)

## 2023-09-05 PROCEDURE — 36415 COLL VENOUS BLD VENIPUNCTURE: CPT | Mod: PO | Performed by: INTERNAL MEDICINE

## 2023-09-05 PROCEDURE — 83036 HEMOGLOBIN GLYCOSYLATED A1C: CPT | Performed by: INTERNAL MEDICINE

## 2023-09-05 PROCEDURE — 85025 COMPLETE CBC W/AUTO DIFF WBC: CPT | Performed by: INTERNAL MEDICINE

## 2023-09-05 PROCEDURE — 80053 COMPREHEN METABOLIC PANEL: CPT | Performed by: INTERNAL MEDICINE

## 2023-09-07 ENCOUNTER — PATIENT MESSAGE (OUTPATIENT)
Dept: ADMINISTRATIVE | Facility: OTHER | Age: 73
End: 2023-09-07
Payer: MEDICARE

## 2023-09-07 ENCOUNTER — PATIENT MESSAGE (OUTPATIENT)
Dept: ADMINISTRATIVE | Facility: HOSPITAL | Age: 73
End: 2023-09-07
Payer: MEDICARE

## 2023-09-11 ENCOUNTER — TELEPHONE (OUTPATIENT)
Dept: FAMILY MEDICINE | Facility: CLINIC | Age: 73
End: 2023-09-11
Payer: MEDICARE

## 2023-09-11 ENCOUNTER — PATIENT MESSAGE (OUTPATIENT)
Dept: ADMINISTRATIVE | Facility: OTHER | Age: 73
End: 2023-09-11
Payer: MEDICARE

## 2023-09-11 NOTE — TELEPHONE ENCOUNTER
----- Message from Rosamaria Bailey sent at 9/11/2023  9:43 AM CDT -----  Type: Patient Call Back    Who called:self     What is the request in detail: asking for a call about appt     Can the clinic reply by [facility]? No     Would the patient rather a call back or a response via [facility]? Call     Best call back number:[phone] ([facility])  [phone] ([place])

## 2023-09-12 ENCOUNTER — PATIENT MESSAGE (OUTPATIENT)
Dept: FAMILY MEDICINE | Facility: CLINIC | Age: 73
End: 2023-09-12

## 2023-09-12 ENCOUNTER — OFFICE VISIT (OUTPATIENT)
Dept: FAMILY MEDICINE | Facility: CLINIC | Age: 73
End: 2023-09-12
Payer: MEDICARE

## 2023-09-12 VITALS
HEIGHT: 65 IN | BODY MASS INDEX: 32.9 KG/M2 | TEMPERATURE: 98 F | HEART RATE: 67 BPM | SYSTOLIC BLOOD PRESSURE: 108 MMHG | OXYGEN SATURATION: 96 % | WEIGHT: 197.44 LBS | DIASTOLIC BLOOD PRESSURE: 60 MMHG

## 2023-09-12 DIAGNOSIS — R53.83 FATIGUE, UNSPECIFIED TYPE: ICD-10-CM

## 2023-09-12 DIAGNOSIS — E03.8 HYPOTHYROIDISM DUE TO HASHIMOTO'S THYROIDITIS: ICD-10-CM

## 2023-09-12 DIAGNOSIS — E11.42 TYPE 2 DIABETES MELLITUS WITH DIABETIC POLYNEUROPATHY, WITHOUT LONG-TERM CURRENT USE OF INSULIN: ICD-10-CM

## 2023-09-12 DIAGNOSIS — E06.3 HYPOTHYROIDISM DUE TO HASHIMOTO'S THYROIDITIS: ICD-10-CM

## 2023-09-12 DIAGNOSIS — I70.0 ABDOMINAL AORTIC ATHEROSCLEROSIS: ICD-10-CM

## 2023-09-12 DIAGNOSIS — M75.81 RIGHT ROTATOR CUFF TENDONITIS: Primary | ICD-10-CM

## 2023-09-12 DIAGNOSIS — E66.01 SEVERE OBESITY (BMI 35.0-39.9) WITH COMORBIDITY: ICD-10-CM

## 2023-09-12 DIAGNOSIS — M77.11 RIGHT LATERAL EPICONDYLITIS: ICD-10-CM

## 2023-09-12 DIAGNOSIS — D3A.090 CARCINOID TUMOR OF LUNG, UNSPECIFIED WHETHER MALIGNANT: ICD-10-CM

## 2023-09-12 DIAGNOSIS — E78.5 DYSLIPIDEMIA: ICD-10-CM

## 2023-09-12 PROBLEM — E66.811 CLASS 1 OBESITY DUE TO EXCESS CALORIES WITH SERIOUS COMORBIDITY AND BODY MASS INDEX (BMI) OF 32.0 TO 32.9 IN ADULT: Status: ACTIVE | Noted: 2022-02-16

## 2023-09-12 PROCEDURE — 1125F PR PAIN SEVERITY QUANTIFIED, PAIN PRESENT: ICD-10-PCS | Mod: CPTII,S$GLB,, | Performed by: INTERNAL MEDICINE

## 2023-09-12 PROCEDURE — 1159F MED LIST DOCD IN RCRD: CPT | Mod: CPTII,S$GLB,, | Performed by: INTERNAL MEDICINE

## 2023-09-12 PROCEDURE — 3074F PR MOST RECENT SYSTOLIC BLOOD PRESSURE < 130 MM HG: ICD-10-PCS | Mod: CPTII,S$GLB,, | Performed by: INTERNAL MEDICINE

## 2023-09-12 PROCEDURE — 3061F NEG MICROALBUMINURIA REV: CPT | Mod: CPTII,S$GLB,, | Performed by: INTERNAL MEDICINE

## 2023-09-12 PROCEDURE — 3044F PR MOST RECENT HEMOGLOBIN A1C LEVEL <7.0%: ICD-10-PCS | Mod: CPTII,S$GLB,, | Performed by: INTERNAL MEDICINE

## 2023-09-12 PROCEDURE — 3044F HG A1C LEVEL LT 7.0%: CPT | Mod: CPTII,S$GLB,, | Performed by: INTERNAL MEDICINE

## 2023-09-12 PROCEDURE — 1125F AMNT PAIN NOTED PAIN PRSNT: CPT | Mod: CPTII,S$GLB,, | Performed by: INTERNAL MEDICINE

## 2023-09-12 PROCEDURE — 3288F PR FALLS RISK ASSESSMENT DOCUMENTED: ICD-10-PCS | Mod: CPTII,S$GLB,, | Performed by: INTERNAL MEDICINE

## 2023-09-12 PROCEDURE — 3288F FALL RISK ASSESSMENT DOCD: CPT | Mod: CPTII,S$GLB,, | Performed by: INTERNAL MEDICINE

## 2023-09-12 PROCEDURE — 3078F PR MOST RECENT DIASTOLIC BLOOD PRESSURE < 80 MM HG: ICD-10-PCS | Mod: CPTII,S$GLB,, | Performed by: INTERNAL MEDICINE

## 2023-09-12 PROCEDURE — 1101F PR PT FALLS ASSESS DOC 0-1 FALLS W/OUT INJ PAST YR: ICD-10-PCS | Mod: CPTII,S$GLB,, | Performed by: INTERNAL MEDICINE

## 2023-09-12 PROCEDURE — 1160F RVW MEDS BY RX/DR IN RCRD: CPT | Mod: CPTII,S$GLB,, | Performed by: INTERNAL MEDICINE

## 2023-09-12 PROCEDURE — 1159F PR MEDICATION LIST DOCUMENTED IN MEDICAL RECORD: ICD-10-PCS | Mod: CPTII,S$GLB,, | Performed by: INTERNAL MEDICINE

## 2023-09-12 PROCEDURE — 4010F PR ACE/ARB THEARPY RXD/TAKEN: ICD-10-PCS | Mod: CPTII,S$GLB,, | Performed by: INTERNAL MEDICINE

## 2023-09-12 PROCEDURE — 99214 OFFICE O/P EST MOD 30 MIN: CPT | Mod: S$GLB,,, | Performed by: INTERNAL MEDICINE

## 2023-09-12 PROCEDURE — 99214 PR OFFICE/OUTPT VISIT, EST, LEVL IV, 30-39 MIN: ICD-10-PCS | Mod: S$GLB,,, | Performed by: INTERNAL MEDICINE

## 2023-09-12 PROCEDURE — 1101F PT FALLS ASSESS-DOCD LE1/YR: CPT | Mod: CPTII,S$GLB,, | Performed by: INTERNAL MEDICINE

## 2023-09-12 PROCEDURE — 3061F PR NEG MICROALBUMINURIA RESULT DOCUMENTED/REVIEW: ICD-10-PCS | Mod: CPTII,S$GLB,, | Performed by: INTERNAL MEDICINE

## 2023-09-12 PROCEDURE — 3066F NEPHROPATHY DOC TX: CPT | Mod: CPTII,S$GLB,, | Performed by: INTERNAL MEDICINE

## 2023-09-12 PROCEDURE — 3078F DIAST BP <80 MM HG: CPT | Mod: CPTII,S$GLB,, | Performed by: INTERNAL MEDICINE

## 2023-09-12 PROCEDURE — 3008F BODY MASS INDEX DOCD: CPT | Mod: CPTII,S$GLB,, | Performed by: INTERNAL MEDICINE

## 2023-09-12 PROCEDURE — 4010F ACE/ARB THERAPY RXD/TAKEN: CPT | Mod: CPTII,S$GLB,, | Performed by: INTERNAL MEDICINE

## 2023-09-12 PROCEDURE — 1160F PR REVIEW ALL MEDS BY PRESCRIBER/CLIN PHARMACIST DOCUMENTED: ICD-10-PCS | Mod: CPTII,S$GLB,, | Performed by: INTERNAL MEDICINE

## 2023-09-12 PROCEDURE — 3008F PR BODY MASS INDEX (BMI) DOCUMENTED: ICD-10-PCS | Mod: CPTII,S$GLB,, | Performed by: INTERNAL MEDICINE

## 2023-09-12 PROCEDURE — 99999 PR PBB SHADOW E&M-EST. PATIENT-LVL V: CPT | Mod: PBBFAC,,, | Performed by: INTERNAL MEDICINE

## 2023-09-12 PROCEDURE — 3074F SYST BP LT 130 MM HG: CPT | Mod: CPTII,S$GLB,, | Performed by: INTERNAL MEDICINE

## 2023-09-12 PROCEDURE — 99999 PR PBB SHADOW E&M-EST. PATIENT-LVL V: ICD-10-PCS | Mod: PBBFAC,,, | Performed by: INTERNAL MEDICINE

## 2023-09-12 PROCEDURE — 3066F PR DOCUMENTATION OF TREATMENT FOR NEPHROPATHY: ICD-10-PCS | Mod: CPTII,S$GLB,, | Performed by: INTERNAL MEDICINE

## 2023-09-12 RX ORDER — DULAGLUTIDE 0.75 MG/.5ML
0.75 INJECTION, SOLUTION SUBCUTANEOUS
Qty: 4 PEN | Refills: 2 | Status: SHIPPED | OUTPATIENT
Start: 2023-09-12 | End: 2024-03-15

## 2023-09-12 RX ORDER — DULAGLUTIDE 1.5 MG/.5ML
1.5 INJECTION, SOLUTION SUBCUTANEOUS
Qty: 4 PEN | Refills: 11 | Status: SHIPPED | OUTPATIENT
Start: 2023-09-12 | End: 2023-09-19 | Stop reason: SDUPTHER
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
## 2023-09-12 NOTE — PROGRESS NOTES
This note was created by combination of typed  and M-Modal dictation.  Transcription errors may be present.  If there are any questions, please contact me.    Assessment and Plan:   Assessment and Plan    Right rotator cuff tendonitis  Right lateral epicondylitis  -notes some achiness in the shoulder in the elbow, little bit in the ankle as well.  Consider the possibility of statin myalgias.  For now focus on directed treatment to the shoulder and the elbow.    Home physical therapy handout.  I forgot to give them to her before she left so I sent her a message with the online link to [employer] for these exercises.    Topical Voltaren gel   If no improvement next step would be formal physical therapy.    Type 2 diabetes mellitus with diabetic polyneuropathy, without long-term current use of insulin  Severe obesity (BMI 35.0-39.9) with comorbidity  -notes weight loss with Trulicity, has been off of it for a month due to cost.  She would be interested in restarting it.  She is currently in the donut hole and 1 month at a time would be okay.    Restart on 0.75 for the 1st month and then back to 1.5 maintenance.  Stay on metformin  -     dulaglutide (TRULICITY) 1.5 mg/0.5 mL pen injector; Inject 1.5 mg into the skin every 7 days. From month 2 onward  Dispense: 4 pen ; Refill: 11  -     dulaglutide (TRULICITY) 0.75 mg/0.5 mL pen injector; Inject 0.75 mg into the skin every 7 days.  Dispense: 4 pen ; Refill: 2  -     Comprehensive Metabolic Panel; Future; Expected date: 03/12/2024  -     CBC Without Differential; Future; Expected date: 03/12/2024  -     Hemoglobin A1C; Future; Expected date: 03/12/2024    Dyslipidemia  Abdominal aortic atherosclerosis  -check future labs on statin.  Last lipid profile very good.  On low-dose statin  -     Lipid Panel; Future; Expected date: 03/12/2024    Fatigue, unspecified type  -possibly from SAM, was not using CPAP every night.  Lab workup with Hematology-Oncology was negative  including vitamin-D levels, ferritin and iron levels, check for heart failure status was normal.  Pan CT scans were normal.    Carcinoid tumor of lung, unspecified whether malignant  -followed by Heme-Onc.  Recent PET-CT with decreasing avidity and plan is surveillance imaging.    Hypothyroidism due to Hashimoto's thyroiditis  -check future TSH on levothyroxine 100  -     TSH; Future; Expected date: 2024       Medications Discontinued During This Encounter   Medication Reason    dulaglutide (TRULICITY) 1.5 mg/0.5 mL pen injector Reorder       meds sent this encounter:  Medications Ordered This Encounter   Medications    dulaglutide (TRULICITY) 0.75 mg/0.5 mL pen injector     Sig: Inject 0.75 mg into the skin every 7 days.     Dispense:  4 pen      Refill:  2    dulaglutide (TRULICITY) 1.5 mg/0.5 mL pen injector     Sig: Inject 1.5 mg into the skin every 7 days. From month 2 onward     Dispense:  4 pen      Refill:  11         Follow Up:  6 months with labs  Future Appointments   Date Time Provider Department Center   10/20/2023  9:30 AM Naina Rucker MD [facility]   3/8/2024  8[facility]30 [facility]   3/15/2024 10:40 AM Clement Phillips MD [facility]         Subjective:   Subjective   Chief Complaint   Patient presents with    Follow-up     6 months        HPI  Alvina is a 72 y.o. female.    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 Patient has had a hysterectomy.    Last appointment with this clinic was Visit date not found. Last visit with me 3/3/2023   To summarize last  visit and events leading up to today:    Last visit with me she would complained of episode of painless gross hematuria.  Referred to Urology and studies ordered   Diabetes A1c was good.  Had previously taken Trulicity but stopped due to cost.  Rechallenge.  Stay on metformin.  HFpEF euvolemic.  Followed by Cardiology.  Toprol-XL, Lasix, Entresto   Dyslipidemia/statin  Carcinoid tumor of the lung followed by Hematology-Oncology  Chest wall pain after surgical resection.  Gabapentin.  Adjustment disorder p.r.n. diazepam  SAM/CPAP    She saw Heme-Onc in follow-up 4/10.  Status post PET-CT with decreasing PET avid lesion.    03/27/2023 CT urogram negative  04/24/2023 cystoscopy normal.  Referred to pelvic floor physical therapy    Saw pulmonology in follow-up 06/20/2023.    SAM/CPAP, not using regularly due to discomfort.  Encouraged to take regularly.  Maybe a factor fatigue.  Dyspnea improved on Entresto though the Entresto was expensive    Has follow-up with cardiology 9/19    04/10/2023 labs  Ferritin normal  Iron profile normal  CBC normal, mildly elevated eosinophil fraction   Vitamin-D normal  Pro BNP normal    TSH 2/28/23 WNL    Pre visit labs  CBC normal   CMP normal   A1c 5.7  Urine microalbumin normal    Today's visit:    Had diarrhea yesterday  Grandchildren with similar  Better today  No blood in stool  No abd pain.    Had been low of 193 pounds home scale  But today 197  Unrestricted diet of late.      Trulicity used it for 4 months   And then when she went for RF, $800, because now she is in the donut hole  But she is interested in a 30 day supply instead of 90 day supply, she thinks she can afford a 30 day supply  Has been off of trulicity > 1 month.  So I will restart at low-dose and ramp up to 1.5    The entresto is expensive because she is in the donut hole.  But she continues to take it.    No recent valium use  Lasix every other day    Notes some pain in her right shoulder and right elbow.  Some on  the left ankle there as well.  She is right-handed.    Still pain at the thoracotomy incisional site.  Feels like there is a lump like scar tissue.  Gabapentin.    Patient Care Team:  Clement Phillips MD as PCP - General (Internal Medicine)  Yuriy Amor MD as Consulting Physician (Internal Medicine)  Radha Calix MD (Pulmonary Disease)  Edmund Cantrell MD as Consulting Physician (Cardiothoracic Surgery)  Wilbert Arredondo MD (Cardiology)  Chris Muro MD as Consulting Physician (Ophthalmology)  [facility] (Gastroenterology)  Basilia Medley MA as Care Coordinator  David Bansal MD (Ophthalmology)  Hayley Pink MD (Hematology and Oncology)  Bonnie Francis as Digital Medicine Health   Clement Phillips MD as Diabetes Digital Medicine Responsible Provider (Internal Medicine)  Sofie Carrera, PharmD as Diabetes Digital Medicine Clinician (Pharmacist)  Medicare, [employer] Managed as Diabetes Digital Medicine Contract      Patient Active Problem List    Diagnosis Date Noted    Gross hematuria 04/27/2023    Urinary frequency 04/27/2023    Lack of coordination 04/27/2023    Pelvic floor weakness 04/27/2023    Abdominal aortic atherosclerosis incidental on CT; on statin 03/27/2023    Chronic heart failure with preserved ejection fraction 05/18/2022    Osteopenia of multiple sites 3/2022; repeat 2024 04/01/2022 03/31/2022 DEXA L-spine 1.2, total hip-1.2, femoral neck-1.5.  FRAX score 1.2/9.1%.  Osteopenia.  Compared to previous, decrease in BMD at lumbar spine and total hip.      Fatty liver 02/16/2022 09/08/2021 right upper quadrant ultrasound  Previous cholecystectomy. No biliary ductal dilatation is seen.  Diffusely increased parenchymal echogenicity of the liver most consistent with steatosis. There is no sonographic evidence of cirrhosis and no focal liver lesions are seen.      Obesity due to excess calories with serious comorbidity  02/16/2022    Dyslipidemia 09/21/2020     10/26/2021 TTE LV normal size and thickness.  Normal LV systolic function LVEF 60-65%.  Normal diastolic function. RV normal size and systolic function.  10/26/2021 nuclear medicine stress test abnormal moderate size moderate intensity partially reversible defect mid distal anterior wall.  11/15/2021 left heart catheterization no evidence of angiographically significant coronary artery disease.  By report, elevated EDP.      Hyperplastic polyp of sigmoid colon 08/19/2020 8/31/2017 Colonoscopy 3 mm rectal polyp.  Large internal hemorrhoids.  Hyperplastic polyp      Chronic superficial gastritis without bleeding on EGD 2017 08/19/2020 8/31/2017 EGD normal esophagus.  Patchy mild erythema of antrum.  Biopsied.  Normal duodenum.  Biopsy mild chronic gastritis H pylori negative      Hypothyroidism due to Hashimoto's thyroiditis 07/08/2020    Type 2 diabetes mellitus with diabetic polyneuropathy, without long-term current use of insulin 06/25/2020    Hepatitis C antibody test positive but VL negative, either exposed/immune or false positive initial screening 02/17/2020    Carcinoid tumor of lung 02/07/2020     History of left pulmonary nodule.  Seen by outside CT surgery.  Had previously been sent to intervention Radiology but was unable to biopsy the lesion.  Increased in size from initial 0.7-1.3 cm over 3 years.    9/12/19 CT-guided biopsy:  LUNG, LEFT LOWER LOBE, CORE BIOPSY:   - SMALL FRAGMENTS OF BENIGN PULMONARY ALVEOLAR PARENCHYMA.   - NO EVIDENCE OF NEOPLASM OR GRANULOMATOUS DISEASE.   6/11/20 lung bx  LUNG, NEEDLE BIOPSY, CLINICALLY LEFT LOWER LOBE:  --ATYPICAL ADENOMATOUS HYPERPLASIA ( 0.5 MM LESION, AS MEASURED ON   SLIDE).   She underwent on July 31, 2020 a left lower lobe wedge resection of the mass with completion robotic lobectomy and mediastinal lymph node dissection.  The final pathology report showed the presence of low-grade carcinoid tumor measuring  1.20 cm that was low-grade with a Ki-67 of 3%. The margins were negative. The lymph node at the level 7, 8, 10 and 11 came negative for metastatic disease. There was no direct invasion of the adjacent structure no lymphovascular invasion. Her final stage was a pT1 pN0 pMX.  9/10/20 PET/CT neuroendocrine study:  1.   Postoperative changes of recent left lower lobe pulmonary nodule wedge resection with mild metabolic activity along the lateral margin of the suture line likely being postoperative in nature. Continued attention at this site is warranted on future imaging.  2.   No evidence of metastatic disease identified.  3/23/22 PET CT 1.   Stable postsurgical changes in the left lung without evidence of local recurrence or distant metastatic disease identified.      Family history of heart disease Dr. Arredondo 02/07/2020     10/03/2013 nuclear medicine stress test no significant EKG changes.  No chest pain. Normal perfusion scan.  Normal LV systolic function  10/03/2013 TTE normal LV size wall thickness and systolic function.  05/19/17 Nuclear stress test: Five minutes standard Dario protocol. 1 mm horizontal ST depression. Substernal chest heaviness and tightness, resolved in recovery. Perfusion scan normal.  05/17 Echocardiogram shows normal cardiac function. EF normal. Greater than 50%. Grade I diastolic function.  02/02/18 Coronary calcium score was 46 with 41 in the LAD, circumflex was 5. Her liver measured 41 Hounsfield units. The spleen could not be visualized. These findings are consistent with fatty liver. The textural appearance to me of the liver was that of fatty liver. She had normal origin of the coronary arteries, normal configuration of the pulmonary veins, left atrium 35 mm, calcified plaque was also seen in the aortic annulus. Per Radiology, she had a 1 cm nodule in the left lower lobe. Radiology recommended followup in six months with CT.  6/22/20 nu med stress test  Nuclear scans show a small area  of possible anterior wall ischemia. This could also be secondary to breast artefact. Clinical      correlation suggested.     Gated scan is wnl. EF 80%     Ekg portion of test is negative         Other specified glaucoma 02/07/2020    Chronic midline low back pain without sciatica hx of laminectomy L5S1; flexeril PRN 02/07/2020    Left lumbar radiculopathy from L sciatica, remote laminectomy but residual weakness/numbness L leg 02/07/2020    Arthritis of knee, right 05/03/2019    Complex tear of medial meniscus of right knee as current injury 05/03/2019    Mixed incontinence urge and stress (male)(female) 03/05/2013    Cystocele 03/05/2013    Rectocele 03/05/2013    Chronic constipation 03/05/2013    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       PAST MEDICAL PROBLEMS, PAST SURGICAL HISTORY: please see relevant portions of the electronic medical record    ALLERGIES AND MEDICATIONS: updated and reviewed.  Medication List with Changes/Refills   Current Medications    ACETAMINOPHEN (TYLENOL) 500 MG TABLET    Take 500 mg by mouth every 6 (six) hours as needed for Pain.    ALBUTEROL (VENTOLIN HFA) 90 MCG/ACTUATION INHALER    Inhale 2 puffs into the lungs every 6 (six) hours as needed for Wheezing. Rescue    ASPIRIN (ECOTRIN) 81 MG EC TABLET        AZELASTINE (ASTELIN) 137 MCG (0.1 %) NASAL SPRAY    1 spray (137 mcg total) by Nasal route 2 (two) times daily.    BRIMONIDINE 0.2% (ALPHAGAN) 0.2 % DROP        DIAZEPAM (VALIUM) 5 MG TABLET    Take 1 tablet (5 mg total) by mouth nightly as needed for Anxiety.    DORZOLAMIDE-TIMOLOL 2-0.5% (COSOPT) 22.3-6.8 MG/ML OPHTHALMIC SOLUTION        DULAGLUTIDE (TRULICITY) 1.5 MG/0.5 ML PEN INJECTOR    Inject 1.5 mg into the skin every 7 days. From month 2 onward    ENTRESTO 24-26 MG PER TABLET    Take 1 tablet by mouth 2 (two) times daily.    FUROSEMIDE (LASIX) 20 MG TABLET    Take 1 tablet (20 mg total) by mouth once daily.    LEVOTHYROXINE (SYNTHROID) 100 MCG TABLET    Take 1 tablet (100 mcg  "total) by mouth before breakfast.    METFORMIN (GLUCOPHAGE-XR) 500 MG ER 24HR TABLET    Take 2 tablets (1,000 mg total) by mouth 2 (two) times daily with meals.    ROSUVASTATIN (CRESTOR) 10 MG TABLET    Take 1 tablet (10 mg total) by mouth nightly.    VITAMIN D (VITAMIN D3) 1000 UNITS TAB             Objective:   Objective   Physical Exam   Vitals:    09/12/23 1034   BP: 108/60   Pulse: 67   Temp: 98.2 °F (36.8 °C)   TempSrc: Oral   SpO2: 96%   Weight: 89.5 kg (197 lb 6.8 oz)   Height: 5' 5" (1.651 m)    Body mass index is 32.85 kg/m².  Weight: 89.5 kg (197 lb 6.8 oz)   Height: 5' 5" (165.1 cm)     Physical Exam  Constitutional:       General: She is not in acute distress.     Appearance: She is well-developed.   Eyes:      Extraocular Movements: Extraocular movements intact.   Cardiovascular:      Rate and Rhythm: Normal rate and regular rhythm.      Heart sounds: Normal heart sounds. No murmur heard.  Pulmonary:      Effort: Pulmonary effort is normal.      Breath sounds: Normal breath sounds.   Musculoskeletal:         General: Normal range of motion.      Right lower leg: No edema.      Left lower leg: No edema.      Comments: The right shoulder some discomfort with internal external rotation.  Full range of motion abduction, drop arm negative.  The right elbow without effusion, lateral epicondyle is tender   Skin:     General: Skin is warm and dry.   Neurological:      Mental Status: She is alert and oriented to person, place, and time.      Coordination: Coordination normal.   Psychiatric:         Behavior: Behavior normal.         Thought Content: Thought content normal.         Component      Latest Ref Rng 3/27/2023 9/5/2023   WBC      3.90 - 12.70 K/uL  7.11    RBC      4.00 - 5.40 M/uL  4.49    Hemoglobin      12.0 - 16.0 g/dL  13.4    Hematocrit      37.0 - 48.5 %  41.4    MCV      82 - 98 fL  92    MCH      27.0 - 31.0 pg  29.8    MCHC      32.0 - 36.0 g/dL  32.4    RDW      11.5 - 14.5 %  13.2  "   Platelets      150 - 450 K/uL  197    MPV      9.2 - 12.9 fL  11.3    Immature Granulocytes      0.0 - 0.5 %  0.1    Gran # (ANC)      1.8 - 7.7 K/uL  2.6    Immature Grans (Abs)      0.00 - 0.04 K/uL  0.01    Lymph #      1.0 - 4.8 K/uL  3.2    Mono #      0.3 - 1.0 K/uL  0.6    Eos #      0.0 - 0.5 K/uL  0.6 (H)    Baso #      0.00 - 0.20 K/uL  0.08    nRBC      0 /100 WBC  0    Gran %      38.0 - 73.0 %  36.5 (L)    Lymph %      18.0 - 48.0 %  45.3    Mono %      4.0 - 15.0 %  8.7    Eosinophil %      0.0 - 8.0 %  8.3 (H)    Basophil %      0.0 - 1.9 %  1.1    Differential Method  Automated    Sodium      136 - 145 mmol/L 141  140    Potassium      3.5 - 5.1 mmol/L 4.4  4.0    Chloride      95 - 110 mmol/L 109  107    CO2      23 - 29 mmol/L 24  24    Glucose      70 - 110 mg/dL 100  117 (H)    BUN      8 - 23 mg/dL 17  14    Creatinine      0.5 - 1.4 mg/dL 0.7  0.7    Calcium      8.7 - 10.5 mg/dL 9.4  9.3    PROTEIN TOTAL      6.0 - 8.4 g/dL  6.9    Albumin      3.5 - 5.2 g/dL  3.9    BILIRUBIN TOTAL      0.1 - 1.0 mg/dL  0.5    Alkaline Phosphatase      55 - 135 U/L  59    AST      10 - 40 U/L  27    ALT      10 - 44 U/L  31    eGFR      >60 mL/min/1.73 m^2 >60  >60.0    Anion Gap      8 - 16 mmol/L 8  9    Urine Microalbumin      ug/mL  19.0    Creatinine, Urine      15.0 - 325.0 mg/dL  219.0    MICROALB/CREAT RATIO      0.0 - 30.0 ug/mg  8.7    Hemoglobin A1C External      4.0 - 5.6 %  5.7 (H)    Estimated Avg Glucose      68 - 131 mg/dL  117       Legend:  (H) High  (L) Low

## 2023-09-18 DIAGNOSIS — E11.42 TYPE 2 DIABETES MELLITUS WITH DIABETIC POLYNEUROPATHY, WITHOUT LONG-TERM CURRENT USE OF INSULIN: ICD-10-CM

## 2023-09-18 RX ORDER — DULAGLUTIDE 1.5 MG/.5ML
INJECTION, SOLUTION SUBCUTANEOUS
Refills: 0 | OUTPATIENT
Start: 2023-09-18

## 2023-09-18 NOTE — TELEPHONE ENCOUNTER
No care due was identified.  [facility] Embedded Care Due Messages. Reference number: [id number].   9/18/2023 1:51:50 PM CDT

## 2023-09-18 NOTE — TELEPHONE ENCOUNTER
Refill Decision Note   Alvina Fisher  is requesting a refill authorization.  Brief Assessment and Rationale for Refill:  Quick Discontinue     Medication Therapy Plan:    Pharmacy is requesting new scripts for the following medications without required information, (sig/ frequency/qty/etc)      Medication Reconciliation Completed: No     Comments: Pharmacies have been requesting medications for patients without required information, (sig, frequency, qty, etc.). In addition, requests are sent for medication(s) pt. are currently not taking, and medications patients have never taken.    We have spoken to the pharmacies about these request types and advised their teams previously that we are unable to assess these New Script requests and require all details for these requests. This is a known issue and has been reported.     Note composed:3:31 PM 09/18/2023

## 2023-09-19 DIAGNOSIS — E11.42 TYPE 2 DIABETES MELLITUS WITH DIABETIC POLYNEUROPATHY, WITHOUT LONG-TERM CURRENT USE OF INSULIN: ICD-10-CM

## 2023-09-19 RX ORDER — DULAGLUTIDE 1.5 MG/.5ML
1.5 INJECTION, SOLUTION SUBCUTANEOUS
Qty: 4 PEN | Refills: 11 | Status: SHIPPED | OUTPATIENT
Start: 2023-09-19 | End: 2024-03-15

## 2023-09-19 NOTE — TELEPHONE ENCOUNTER
Patient sts that the trulicity is too pricey at [facility]. She would like for the medicine to be sent to [facility] pharmacy. Please advise!

## 2023-09-19 NOTE — TELEPHONE ENCOUNTER
No care due was identified.  [facility] Embedded Care Due Messages. Reference number: [id number].   9/19/2023 3:15:27 PM CDT

## 2023-09-19 NOTE — TELEPHONE ENCOUNTER
----- Message from Cliff Jensen sent at 9/19/2023  2:07 PM CDT -----  Regarding: Alvina  Type: RX Refill Request     Who Called: Alvina      Have you contacted your pharmacy: Yes     Refill or New Rx: Refill      RX Name and Strength: dulaglutide (TRULICITY) 1.5 mg/0.5 mL pen injector    Preferred Pharmacy with phone number:.  [facility] - [facility] [address]  [address] [address]  Phone: [phone] Fax: [phone]    Local or Mail Order: Mail Order     Ordering Provider: Dr. Clement Phillips     Would the patient rather a call back or a response via My [facility]?Callback      Best Call Back Number: [phone]     Additional Information: Patient stated that the medication is too high. Patient stated that the medication was denied by the doctor. Patient stated that she canceled the the prescription and sent to a mail order. Please call the pharmacy to get it squared away and call the patient once done.

## 2023-09-23 ENCOUNTER — IMMUNIZATION (OUTPATIENT)
Dept: OBSTETRICS AND GYNECOLOGY | Facility: CLINIC | Age: 73
End: 2023-09-23
Payer: MEDICARE

## 2023-09-23 DIAGNOSIS — Z23 NEED FOR INFLUENZA VACCINATION: Primary | ICD-10-CM

## 2023-09-23 PROCEDURE — G0008 ADMIN INFLUENZA VIRUS VAC: HCPCS | Mod: S$GLB,,, | Performed by: INTERNAL MEDICINE

## 2023-09-23 PROCEDURE — 90694 FLU VACCINE - QUADRIVALENT - ADJUVANTED: ICD-10-PCS | Mod: S$GLB,,, | Performed by: INTERNAL MEDICINE

## 2023-09-23 PROCEDURE — G0008 FLU VACCINE - QUADRIVALENT - ADJUVANTED: ICD-10-PCS | Mod: S$GLB,,, | Performed by: INTERNAL MEDICINE

## 2023-09-23 PROCEDURE — 90694 VACC AIIV4 NO PRSRV 0.5ML IM: CPT | Mod: S$GLB,,, | Performed by: INTERNAL MEDICINE

## 2023-10-20 ENCOUNTER — OFFICE VISIT (OUTPATIENT)
Dept: UROLOGY | Facility: CLINIC | Age: 73
End: 2023-10-20
Payer: MEDICARE

## 2023-10-20 VITALS — WEIGHT: 196.63 LBS | BODY MASS INDEX: 32.72 KG/M2

## 2023-10-20 DIAGNOSIS — N39.41 URGE URINARY INCONTINENCE: Primary | ICD-10-CM

## 2023-10-20 PROBLEM — R31.0 GROSS HEMATURIA: Status: RESOLVED | Noted: 2023-04-27 | Resolved: 2023-10-20

## 2023-10-20 PROCEDURE — 3066F PR DOCUMENTATION OF TREATMENT FOR NEPHROPATHY: ICD-10-PCS | Mod: CPTII,S$GLB,, | Performed by: STUDENT IN AN ORGANIZED HEALTH CARE EDUCATION/TRAINING PROGRAM

## 2023-10-20 PROCEDURE — 3288F PR FALLS RISK ASSESSMENT DOCUMENTED: ICD-10-PCS | Mod: CPTII,S$GLB,, | Performed by: STUDENT IN AN ORGANIZED HEALTH CARE EDUCATION/TRAINING PROGRAM

## 2023-10-20 PROCEDURE — 3044F HG A1C LEVEL LT 7.0%: CPT | Mod: CPTII,S$GLB,, | Performed by: STUDENT IN AN ORGANIZED HEALTH CARE EDUCATION/TRAINING PROGRAM

## 2023-10-20 PROCEDURE — 3066F NEPHROPATHY DOC TX: CPT | Mod: CPTII,S$GLB,, | Performed by: STUDENT IN AN ORGANIZED HEALTH CARE EDUCATION/TRAINING PROGRAM

## 2023-10-20 PROCEDURE — 3044F PR MOST RECENT HEMOGLOBIN A1C LEVEL <7.0%: ICD-10-PCS | Mod: CPTII,S$GLB,, | Performed by: STUDENT IN AN ORGANIZED HEALTH CARE EDUCATION/TRAINING PROGRAM

## 2023-10-20 PROCEDURE — 1160F RVW MEDS BY RX/DR IN RCRD: CPT | Mod: CPTII,S$GLB,, | Performed by: STUDENT IN AN ORGANIZED HEALTH CARE EDUCATION/TRAINING PROGRAM

## 2023-10-20 PROCEDURE — 1101F PT FALLS ASSESS-DOCD LE1/YR: CPT | Mod: CPTII,S$GLB,, | Performed by: STUDENT IN AN ORGANIZED HEALTH CARE EDUCATION/TRAINING PROGRAM

## 2023-10-20 PROCEDURE — 99999 PR PBB SHADOW E&M-EST. PATIENT-LVL III: CPT | Mod: PBBFAC,,, | Performed by: STUDENT IN AN ORGANIZED HEALTH CARE EDUCATION/TRAINING PROGRAM

## 2023-10-20 PROCEDURE — 1159F PR MEDICATION LIST DOCUMENTED IN MEDICAL RECORD: ICD-10-PCS | Mod: CPTII,S$GLB,, | Performed by: STUDENT IN AN ORGANIZED HEALTH CARE EDUCATION/TRAINING PROGRAM

## 2023-10-20 PROCEDURE — 3061F NEG MICROALBUMINURIA REV: CPT | Mod: CPTII,S$GLB,, | Performed by: STUDENT IN AN ORGANIZED HEALTH CARE EDUCATION/TRAINING PROGRAM

## 2023-10-20 PROCEDURE — 1126F AMNT PAIN NOTED NONE PRSNT: CPT | Mod: CPTII,S$GLB,, | Performed by: STUDENT IN AN ORGANIZED HEALTH CARE EDUCATION/TRAINING PROGRAM

## 2023-10-20 PROCEDURE — 1101F PR PT FALLS ASSESS DOC 0-1 FALLS W/OUT INJ PAST YR: ICD-10-PCS | Mod: CPTII,S$GLB,, | Performed by: STUDENT IN AN ORGANIZED HEALTH CARE EDUCATION/TRAINING PROGRAM

## 2023-10-20 PROCEDURE — 1160F PR REVIEW ALL MEDS BY PRESCRIBER/CLIN PHARMACIST DOCUMENTED: ICD-10-PCS | Mod: CPTII,S$GLB,, | Performed by: STUDENT IN AN ORGANIZED HEALTH CARE EDUCATION/TRAINING PROGRAM

## 2023-10-20 PROCEDURE — 1159F MED LIST DOCD IN RCRD: CPT | Mod: CPTII,S$GLB,, | Performed by: STUDENT IN AN ORGANIZED HEALTH CARE EDUCATION/TRAINING PROGRAM

## 2023-10-20 PROCEDURE — 4010F ACE/ARB THERAPY RXD/TAKEN: CPT | Mod: CPTII,S$GLB,, | Performed by: STUDENT IN AN ORGANIZED HEALTH CARE EDUCATION/TRAINING PROGRAM

## 2023-10-20 PROCEDURE — 3288F FALL RISK ASSESSMENT DOCD: CPT | Mod: CPTII,S$GLB,, | Performed by: STUDENT IN AN ORGANIZED HEALTH CARE EDUCATION/TRAINING PROGRAM

## 2023-10-20 PROCEDURE — 4010F PR ACE/ARB THEARPY RXD/TAKEN: ICD-10-PCS | Mod: CPTII,S$GLB,, | Performed by: STUDENT IN AN ORGANIZED HEALTH CARE EDUCATION/TRAINING PROGRAM

## 2023-10-20 PROCEDURE — 3008F PR BODY MASS INDEX (BMI) DOCUMENTED: ICD-10-PCS | Mod: CPTII,S$GLB,, | Performed by: STUDENT IN AN ORGANIZED HEALTH CARE EDUCATION/TRAINING PROGRAM

## 2023-10-20 PROCEDURE — 3008F BODY MASS INDEX DOCD: CPT | Mod: CPTII,S$GLB,, | Performed by: STUDENT IN AN ORGANIZED HEALTH CARE EDUCATION/TRAINING PROGRAM

## 2023-10-20 PROCEDURE — 99999 PR PBB SHADOW E&M-EST. PATIENT-LVL III: ICD-10-PCS | Mod: PBBFAC,,, | Performed by: STUDENT IN AN ORGANIZED HEALTH CARE EDUCATION/TRAINING PROGRAM

## 2023-10-20 PROCEDURE — 99213 OFFICE O/P EST LOW 20 MIN: CPT | Mod: S$GLB,,, | Performed by: STUDENT IN AN ORGANIZED HEALTH CARE EDUCATION/TRAINING PROGRAM

## 2023-10-20 PROCEDURE — 3061F PR NEG MICROALBUMINURIA RESULT DOCUMENTED/REVIEW: ICD-10-PCS | Mod: CPTII,S$GLB,, | Performed by: STUDENT IN AN ORGANIZED HEALTH CARE EDUCATION/TRAINING PROGRAM

## 2023-10-20 PROCEDURE — 99213 PR OFFICE/OUTPT VISIT, EST, LEVL III, 20-29 MIN: ICD-10-PCS | Mod: S$GLB,,, | Performed by: STUDENT IN AN ORGANIZED HEALTH CARE EDUCATION/TRAINING PROGRAM

## 2023-10-20 PROCEDURE — 1126F PR PAIN SEVERITY QUANTIFIED, NO PAIN PRESENT: ICD-10-PCS | Mod: CPTII,S$GLB,, | Performed by: STUDENT IN AN ORGANIZED HEALTH CARE EDUCATION/TRAINING PROGRAM

## 2023-10-20 NOTE — PROGRESS NOTES
Patient ID: Alvina Fisher is a 72 y.o. female.    Chief Complaint: No chief complaint on file.    Referral: No referring provider defined for this encounter.     HPI  72 y.o. who presents to the Urology clinic for evaluation of urinary urgency with incontinence. Patient attended several sessions of PFPT. Notes significant improvement of her symptoms. She occasionally uses pads. Not on med management for this. No significant bladder irritant intake.   She has hx of perceived weak stream, straining to void. Cystoscopy did not reveal anatomic obstruction.       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 Arredondo 02/07/2020    Other specified glaucoma 02/07/2020    Chronic midline low back pain without sciatica hx of laminectomy L5S1; flexeril PRN 02/07/2020    Left lumbar radiculopathy from L sciatica, remote laminectomy but residual weakness/numbness L leg 02/07/2020    Hepatitis C antibody test positive but VL negative, either exposed/immune or false positive initial screening 02/17/2020    Type 2 diabetes mellitus with diabetic polyneuropathy, without long-term current use of insulin 06/25/2020    Hypothyroidism due to Hashimoto's thyroiditis 07/08/2020    Hyperplastic polyp of sigmoid colon 08/19/2020    Chronic superficial gastritis without bleeding on EGD 2017 08/19/2020    Dyslipidemia 09/21/2020    Fatty liver 02/16/2022    Class 1 obesity due to excess calories with serious comorbidity and body mass index (BMI) of 32.0 to 32.9 in adult 02/16/2022    Osteopenia of  multiple sites 3/2022; repeat 2024 04/01/2022    Chronic heart failure with preserved ejection fraction 05/18/2022    Abdominal aortic atherosclerosis incidental on CT; on statin 03/27/2023    Gross hematuria 04/27/2023    Urinary frequency 04/27/2023    Lack of coordination 04/27/2023    Pelvic floor weakness 04/27/2023     Resolved Ambulatory Problems     Diagnosis Date Noted    Acute lateral meniscus tear of right knee 05/03/2019    Screening for osteoporosis 02/07/2020    Other specified hypothyroidism 07/08/2020     Past Medical History:   Diagnosis Date    Achilles tendon tear     Coronary artery disease     Diabetes mellitus     Dysplasia of cervix     GERD (gastroesophageal reflux disease)     Glaucoma (increased eye pressure)     Hypertension     Lung nodule     Sinusitis     Sleep apnea     Thyroid disease     Urinary incontinence          Past Surgical History  Past Surgical History:   Procedure Laterality Date    ACHILLES TENDON SURGERY      ARTHROSCOPIC CHONDROPLASTY OF KNEE JOINT Right 5/3/2019    Procedure: ARTHROSCOPY, KNEE, WITH CHONDROPLASTY;  Surgeon: Doug Alexander MD;  Location: [facility];  Service: Orthopedics;  Laterality: Right;    ARTHROSCOPY OF KNEE Right 5/3/2019    Procedure: ARTHROSCOPY, KNEE;  Surgeon: Doug Alexander MD;  Location: [facility];  Service: Orthopedics;  Laterality: Right;    CERVIX LESION DESTRUCTION      cryo x 2    CHOLECYSTECTOMY      EXCISION OF MEDIAL MENISCUS OF KNEE Right 5/3/2019    Procedure: MENISCECTOMY, KNEE, MEDIAL;  Surgeon: Doug Alexander MD;  Location: [facility];  Service: Orthopedics;  Laterality: Right;    HYSTERECTOMY      BLAKE/ovaries remain    INJECTION OF JOINT Right 5/3/2019    Procedure: INJECTION, JOINT - SYNVISE INJECTION;  Surgeon: Doug Alexander MD;  Location: [facility];  Service: Orthopedics;  Laterality: Right;  SYNVISE INJECTION FROM PHARMACY    LAMINECTOMY  1990    L5 S1    OOPHORECTOMY      TUBAL LIGATION         Social History  Social  Connections: Unknown (10/20/2023)    Social Connection and Isolation Panel [NHANES]     Frequency of Communication with Friends and Family: More than three times a week     Frequency of Social Gatherings with Friends and Family: More than three times a week     Attends Shinto Services: Not on file     Active Member of Clubs or Organizations: Yes     Attends Club or Organization Meetings: More than 4 times per year     Marital Status:        Medications    Current Outpatient Medications:     acetaminophen (TYLENOL) 500 MG tablet, Take 500 mg by mouth every 6 (six) hours as needed for Pain., Disp: , Rfl:     albuterol (VENTOLIN HFA) 90 mcg/actuation inhaler, Inhale 2 puffs into the lungs every 6 (six) hours as needed for Wheezing. Rescue, Disp: 18 g, Rfl: 1    aspirin (ECOTRIN) 81 MG EC tablet, , Disp: , Rfl:     azelastine (ASTELIN) 137 mcg (0.1 %) nasal spray, 1 spray (137 mcg total) by Nasal route 2 (two) times daily., Disp: 30 mL, Rfl: 11    brimonidine 0.2% (ALPHAGAN) 0.2 % Drop, , Disp: , Rfl:     diazePAM (VALIUM) 5 MG tablet, Take 1 tablet (5 mg total) by mouth nightly as needed for Anxiety., Disp: 14 tablet, Rfl: 0    dorzolamide-timolol 2-0.5% (COSOPT) 22.3-6.8 mg/mL ophthalmic solution, , Disp: , Rfl:     dulaglutide (TRULICITY) 0.75 mg/0.5 mL pen injector, Inject 0.75 mg into the skin every 7 days., Disp: 4 pen , Rfl: 2    dulaglutide (TRULICITY) 1.5 mg/0.5 mL pen injector, Inject 1.5 mg into the skin every 7 days. From month 2 onward, Disp: 4 pen , Rfl: 11    ENTRESTO 24-26 mg per tablet, Take 1 tablet by mouth 2 (two) times daily., Disp: , Rfl:     furosemide (LASIX) 20 MG tablet, Take 1 tablet (20 mg total) by mouth once daily., Disp: 90 tablet, Rfl: 3    levothyroxine (SYNTHROID) 100 MCG tablet, Take 1 tablet (100 mcg total) by mouth before breakfast., Disp: 90 tablet, Rfl: 3    metFORMIN (GLUCOPHAGE-XR) 500 MG ER 24hr tablet, Take 2 tablets (1,000 mg total) by mouth 2 (two) times daily with  meals., Disp: 360 tablet, Rfl: 3    rosuvastatin (CRESTOR) 10 MG tablet, Take 1 tablet (10 mg total) by mouth nightly., Disp: 90 tablet, Rfl: 3    vitamin D (VITAMIN D3) 1000 units Tab, , Disp: , Rfl:     Allergies  Review of patient's allergies indicates:   Allergen Reactions    Oxycodone-acetaminophen Itching and Nausea Only     NOT ALLERGIC TO ACETAMINOPHEN, HAS TAKEN IT     Codeine Itching       Patient's PMH, FH, Social hx, Medications, allergies reviewed and updated as pertinent to today's visit    Objective:      Physical Exam  Constitutional:       Appearance: She is well-developed.   HENT:      Head: Normocephalic and atraumatic.   Eyes:      Conjunctiva/sclera: Conjunctivae normal.   Pulmonary:      Effort: Pulmonary effort is normal. No respiratory distress.   Abdominal:      General: Abdomen is flat. There is no distension.      Palpations: Abdomen is soft. There is no mass.      Tenderness: There is no right CVA tenderness or left CVA tenderness.   Skin:     General: Skin is warm.      Findings: No rash.   Neurological:      Mental Status: She is alert and oriented to person, place, and time.   Psychiatric:         Behavior: Behavior normal.           Assessment:       1. Urge urinary incontinence        Plan:       Doing well with pelvic floor therapy  Discussed quick pelvic flicks when she has a sudden urge to void to distract her  bladder from the urgency  Patient to return to clinic as needed  Patient not interested in med management for her condition, given the lack of severity, very reasonable

## 2024-01-08 ENCOUNTER — NURSE TRIAGE (OUTPATIENT)
Dept: ADMINISTRATIVE | Facility: CLINIC | Age: 74
End: 2024-01-08
Payer: MEDICARE

## 2024-01-08 NOTE — TELEPHONE ENCOUNTER
Patient states c/o history of hematuria and is under the care of Urology for past  history of kidney stones in September, 2023. Patient currently states c/o hematuria and inability to urinate.    Patient advised to Go to ED Now for evaluation/treatment and to follow up with Urology. Patient also advised to contact the [facility] Triage [facility] for any worsening symptoms. Patient declined care advice at this time. Patient encouraged to adhere to care advice and notified that case encounter will be sent to her Urologist, Dr. Naina Rucker.       Reason for Disposition   Unable to urinate (or only a few drops) > 4 hours and bladder feels very full (e.g., palpable bladder or strong urge to urinate)    Additional Information   Negative: Shock suspected (e.g., cold/pale/clammy skin, too weak to stand, low BP, rapid pulse)   Negative: Sounds like a life-threatening emergency to the triager   Negative: Urinary catheter, questions about   Negative: Recent back or abdominal injury   Negative: Recent genital injury    Protocols used: Urine - Blood In-A-OH

## 2024-01-08 NOTE — TELEPHONE ENCOUNTER
Pt states she is not going  to ED, states she has her grand kids and has placed a  call to urology as she is under urology care

## 2024-01-09 ENCOUNTER — OFFICE VISIT (OUTPATIENT)
Dept: FAMILY MEDICINE | Facility: CLINIC | Age: 74
End: 2024-01-09
Payer: MEDICARE

## 2024-01-09 ENCOUNTER — NURSE TRIAGE (OUTPATIENT)
Dept: ADMINISTRATIVE | Facility: CLINIC | Age: 74
End: 2024-01-09
Payer: MEDICARE

## 2024-01-09 ENCOUNTER — PATIENT MESSAGE (OUTPATIENT)
Dept: UROLOGY | Facility: CLINIC | Age: 74
End: 2024-01-09
Payer: MEDICARE

## 2024-01-09 DIAGNOSIS — I50.32 CHRONIC HEART FAILURE WITH PRESERVED EJECTION FRACTION: ICD-10-CM

## 2024-01-09 DIAGNOSIS — E66.01 SEVERE OBESITY (BMI 35.0-39.9) WITH COMORBIDITY: ICD-10-CM

## 2024-01-09 DIAGNOSIS — R31.9 HEMATURIA, UNSPECIFIED TYPE: Primary | ICD-10-CM

## 2024-01-09 DIAGNOSIS — E11.42 TYPE 2 DIABETES MELLITUS WITH DIABETIC POLYNEUROPATHY, WITHOUT LONG-TERM CURRENT USE OF INSULIN: ICD-10-CM

## 2024-01-09 DIAGNOSIS — I70.0 ABDOMINAL AORTIC ATHEROSCLEROSIS: ICD-10-CM

## 2024-01-09 PROCEDURE — 99214 OFFICE O/P EST MOD 30 MIN: CPT | Mod: 95,,, | Performed by: NURSE PRACTITIONER

## 2024-01-09 RX ORDER — OXYBUTYNIN CHLORIDE 5 MG/1
5 TABLET ORAL 2 TIMES DAILY
Qty: 60 TABLET | Refills: 0 | Status: SHIPPED | OUTPATIENT
Start: 2024-01-09 | End: 2024-01-18 | Stop reason: SINTOL

## 2024-01-09 RX ORDER — TAMSULOSIN HYDROCHLORIDE 0.4 MG/1
0.4 CAPSULE ORAL DAILY
Qty: 30 CAPSULE | Refills: 0 | Status: SHIPPED | OUTPATIENT
Start: 2024-01-09

## 2024-01-09 NOTE — PROGRESS NOTES
"The patient location is:  Patient Home  The chief complaint leading to consultation is: as below  Visit type: Virtual visit with synchronous audio and video  Total time spent with patient: 15 minutes  Each patient to whom he or she provides medical services by telemedicine is:  (1) informed of the relationship between the physician and patient and the respective role of any other health care provider with respect to management of the patient; and (2) notified that she may decline to receive medical services by telemedicine and may withdraw from such care at any time.      HPI     Chief Complaint:  numbness and tingling to fingertips      Alvina Fisher is a 73 y.o. female with multiple medical diagnoses as listed in the medical history and problem list that presents for numbness and tingling to fingertips.  Pt is new to me but is known to this clinic with her last appointment being 9/12/2023.        Numbness and tingling to fingertips:  Reports new onset numbness and tingling to her right and left hand. Worse on her right. Believes she may have carpal tunnel. She has purchased a wrist brace for carpal tunnel which she wears at night and has noticed improvement since doing so.     Blood in urine:  Reports she has noticed blood in her urine which began. Previous hx of kidney stones. She has messaged her urologist but has not heard back and this is why she made an appointment today. Reports associated left lower abd pain which has since improved. Pt believes she may have passed a kidney stone. Pt reports being prescribed Tamsulosin oxybutynin in the past for "my kidney stone and those medication really worked." Pt is not interested in going to the ED or obtaining a CT at this time.     See previous note from urologist on 3/20/23:    HPI  72 y.o. who presents to the Urology clinic for evaluation of gross hematuria. Patient with personal hx of urolithiasis and FH of urolithiasis. Patient denies flank pain, dysuria, " fevers, chills. Patient drinks 4 16 oz cups of water daily. Denies constipation. Former smoker. Has hx of lung cancer. There is FH of breast cancer. Has hx of POP. She takes vit D daily 1000U.     Assessment:       1. Gross hematuria    2. History of nephrolithiasis    3. Family history of nephrolithiasis       Plan:       Hematuria  Discussed differential  Given smoking hx recommend CTU and cystoscopy  Urine culture     Kidney stones  Discussed the etiology and management of urinary stone disease. Explained that stone disease can be multifactorial and can be secondary to genetics, low fluid intake/low urine volume, excess protein/sodium within one's diet, metabolic syndromes (ie DM, hypertension, gout, RTA), sequelae of UTI and certain medications.      Discussed patient will need to adopt lifestyle changes to prevent stone recurrence. Discussed patient will need to drink enough water to make 2.5L of urine, limit excess sodium and protein in diet, increase fruits/vegetables in diet. Reviewed excess vitamin C supplementation can increase risk of formation of kidney stones. Discussed taking vit d QOD given stone hx             Assessment & Plan     Problem List Items Addressed This Visit          Cardiac/Vascular    Chronic heart failure with preserved ejection fraction    The current medical regimen is effective;  continue present plan      Abdominal aortic atherosclerosis incidental on CT; on statin    -assessed and addressed all modifiable risk factors.  Continue with appropriate management to prevent complications with regards to lipid and BP monitoring.         Endocrine    Type 2 diabetes mellitus with diabetic polyneuropathy, without long-term current use of insulin    -discussed with patient about routine diabetic care that includes but are not limited to regular eye exams, skin care, daily foot exam, proper nutrition, regular BG monitoring at home (fasting and mealtime) and medication compliance in a  diabetic.  Target morning BS  and meal-time BS <180      Relevant Orders    Ambulatory referral/consult to Optometry    Severe obesity (BMI 35.0-39.9) with comorbidity    We discussed weight issues and safe, effective ways of losing pounds, includin) diet:  low carbohydrate, low fat diet, stay away from fast food, fried and processed food, use whole grain, lot of fruits and vegetables, use healthy fat such as avocado, nuts and olive oil in reasonable quantity, stay away from sodas. Regular meals with lean proteins.  2) physical activity: ideally 150 min a week, with cardiovascular and resistance activity.  Patient was encouraged to set realistic attainable goals for weight loss, and we will follow up periodically.    Discussed Mediterranean Diet recommendations (Adopted from Connor et al, [facility], 2018.)  - Eat primarily plant-based foods, such as fruits and vegetables, whole grains, legumes (beans) and nuts  - Limit refined carbohydrates (white pasta, bread, rice).  - Replace butter with healthy fats such as olive oil.  - Use herbs and spices instead of salt to flavor foods.  - Limit red meat and processed meats to no more than a few times a month.  - Avoid sugary sodas, bakery goods, and sweets.  - Eat fish and poultry at least twice a week.       Other Visit Diagnoses       Hematuria, unspecified type    -  Primary    Hx of hematuria and nephrolithiasis. Followed by urology. Reports return of hematuria within the past week which was associated with lower abd pain that has since resolved. Denies other associated symptoms such as fever, chills, dysuria. Believes she may have passed a kidney stone but is unsure.     Pt is not interested in going to the ED or obtaining a CT at this time.   Refill flomax and oxybutynin  Increase hydration  Obtain urinalysis    Discussed DDx, condition, and treatment.   Education sent to patient portal/included in after visit summary.  ED precautions given.   Notify provider  if symptoms do not resolve or increase in severity.   Patient verbalizes understanding and agrees with plan of care.      Relevant Medications   tamsulosin (FLOMAX) 0.4 mg Cap   oxybutynin (DITROPAN) 5 MG Tab   Other Relevant Orders   Urinalysis, Reflex to Urine Culture Urine, Clean Catch             --------------------------------------------      Health Maintenance:  Health Maintenance         Date Due Completion Date    RSV Vaccine (Age 60+ and Pregnant patients) (1 - 1-dose 60+ series) Never done ---    Eye Exam 07/14/2021 7/14/2020    COVID-19 Vaccine (4 - 2023-24 season) 09/01/2023 12/2/2021    Foot Exam 03/03/2024 3/3/2023    DEXA Scan 03/22/2024 3/22/2022    Lipid Panel 02/28/2024 2/28/2023    Hemoglobin A1c 03/05/2024 9/5/2023    Mammogram 06/29/2024 6/29/2023    Diabetes Urine Screening 09/05/2024 9/5/2023    High Dose Statin 10/20/2024 10/20/2023    TETANUS VACCINE 08/16/2031 8/16/2021    Colorectal Cancer Screening 09/21/2032 9/21/2022            Advised patient on the importance of completing overdue health maintenance items    Follow Up:  Follow up in about 2 weeks (around 1/23/2024), or if symptoms worsen or fail to improve.    Exam     Review of Systems:  (as noted above)  Review of Systems   Constitutional:  Negative for activity change, fever and unexpected weight change.   HENT:  Negative for hearing loss, rhinorrhea and trouble swallowing.    Eyes:  Negative for discharge and visual disturbance.   Respiratory:  Negative for chest tightness, shortness of breath and wheezing.    Cardiovascular:  Negative for chest pain and palpitations.   Gastrointestinal:  Negative for blood in stool, constipation, diarrhea and vomiting.   Endocrine: Negative for polydipsia and polyuria.   Genitourinary:  Positive for dysuria and hematuria. Negative for difficulty urinating and menstrual problem.   Musculoskeletal:  Negative for arthralgias, joint swelling and neck pain.   Neurological:  Negative for weakness and  headaches.   Psychiatric/Behavioral:  Negative for confusion and dysphoric mood.        Physical Exam:   Physical Exam  Constitutional:       General: She is not in acute distress.     Appearance: She is not toxic-appearing or diaphoretic.   Pulmonary:      Effort: Pulmonary effort is normal.   Neurological:      Mental Status: She is alert.   Psychiatric:         Mood and Affect: Mood normal.       There were no vitals filed for this visit.   There is no height or weight on file to calculate BMI.        History     Past Medical History:  Past Medical History:   Diagnosis Date    Achilles tendon tear     Coronary artery disease     Diabetes mellitus     Dysplasia of cervix     GERD (gastroesophageal reflux disease)     Glaucoma (increased eye pressure)     Gross hematuria 4/27/2023    Hypertension     Lung nodule     Sinusitis     Sleep apnea     Thyroid disease     Urinary incontinence     occ urge       Past Surgical History:  Past Surgical History:   Procedure Laterality Date    ACHILLES TENDON SURGERY      ARTHROSCOPIC CHONDROPLASTY OF KNEE JOINT Right 5/3/2019    Procedure: ARTHROSCOPY, KNEE, WITH CHONDROPLASTY;  Surgeon: Doug Alexander MD;  Location: [facility];  Service: Orthopedics;  Laterality: Right;    ARTHROSCOPY OF KNEE Right 5/3/2019    Procedure: ARTHROSCOPY, KNEE;  Surgeon: Doug Alexander MD;  Location: [facility];  Service: Orthopedics;  Laterality: Right;    CERVIX LESION DESTRUCTION      cryo x 2    CHOLECYSTECTOMY      EXCISION OF MEDIAL MENISCUS OF KNEE Right 5/3/2019    Procedure: MENISCECTOMY, KNEE, MEDIAL;  Surgeon: Doug Alexandre MD;  Location: [facility];  Service: Orthopedics;  Laterality: Right;    HYSTERECTOMY      BLAKE/ovaries remain    INJECTION OF JOINT Right 5/3/2019    Procedure: INJECTION, JOINT - SYNVISE INJECTION;  Surgeon: Doug Alexander MD;  Location: [facility];  Service: Orthopedics;  Laterality: Right;  SYNVISE INJECTION FROM PHARMACY    LAMINECTOMY  1990    L5 S1    OOPHORECTOMY       TUBAL LIGATION         Social History:  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   Tobacco Use    Smoking status: Former     Current packs/day: 0.00     Types: Cigarettes     Quit date: 1990     Years since quittin.7    Smokeless tobacco: Never   Substance and Sexual Activity    Alcohol use: Yes     Comment: occ    Drug use: No    Sexual activity: Yes     Social Determinants of Health     Financial Resource Strain: Low Risk  (2024)    Overall Financial Resource Strain (CARDIA)     Difficulty of Paying Living Expenses: Not hard at all   Food Insecurity: No Food Insecurity (2024)    Hunger Vital Sign     Worried About Running Out of Food in the Last Year: Never true     Ran Out of Food in the Last Year: Never true   Transportation Needs: No Transportation Needs (2024)    PRAPARE - Transportation     Lack of Transportation (Medical): No     Lack of Transportation (Non-Medical): No   Physical Activity: Insufficiently Active (2024)    Exercise Vital Sign     Days of Exercise per Week: 3 days     Minutes of Exercise per Session: 10 min   Stress: No Stress Concern Present (2024)    [place] [employer] - Occupational Stress Questionnaire     Feeling of Stress : Only a little   Social Connections: Unknown (2024)    Social Connection and Isolation Panel [NHANES]     Frequency of Communication with Friends and Family: More than three times a week     Frequency of Social Gatherings with Friends and Family: More than three times a week     Active Member of Clubs or Organizations: Yes     Attends Club or Organization Meetings: More than 4 times per year     Marital Status:    Housing Stability: Low Risk  (2024)    Housing Stability Vital Sign     Unable to Pay for Housing in the Last Year: No     Number of Places Lived in the Last Year: 1     Unstable  Housing in the Last Year: No       Family History:  Family History   Problem Relation Age of Onset    Cancer Maternal Aunt         cervical    Breast cancer Maternal Aunt     Breast cancer Paternal Grandmother     Diabetes Paternal Grandmother     Stroke Mother     Diabetes Father     Stomach cancer Father     Asthma Sister     Heart disease Brother     Osteoarthritis Sister     No Known Problems Daughter     No Known Problems Daughter     No Known Problems Daughter     Ovarian cancer Neg Hx        Allergies and Medications: (updated and reviewed)  Review of patient's allergies indicates:   Allergen Reactions    Oxycodone-acetaminophen Itching and Nausea Only     NOT ALLERGIC TO ACETAMINOPHEN, HAS TAKEN IT     Codeine Itching     Current Outpatient Medications   Medication Sig Dispense Refill    acetaminophen (TYLENOL) 500 MG tablet Take 500 mg by mouth every 6 (six) hours as needed for Pain.      albuterol (VENTOLIN HFA) 90 mcg/actuation inhaler Inhale 2 puffs into the lungs every 6 (six) hours as needed for Wheezing. Rescue 18 g 1    aspirin (ECOTRIN) 81 MG EC tablet       azelastine (ASTELIN) 137 mcg (0.1 %) nasal spray 1 spray (137 mcg total) by Nasal route 2 (two) times daily. 30 mL 11    brimonidine 0.2% (ALPHAGAN) 0.2 % Drop       diazePAM (VALIUM) 5 MG tablet Take 1 tablet (5 mg total) by mouth nightly as needed for Anxiety. 14 tablet 0    dorzolamide-timolol 2-0.5% (COSOPT) 22.3-6.8 mg/mL ophthalmic solution       dulaglutide (TRULICITY) 0.75 mg/0.5 mL pen injector Inject 0.75 mg into the skin every 7 days. 4 pen 2    dulaglutide (TRULICITY) 1.5 mg/0.5 mL pen injector Inject 1.5 mg into the skin every 7 days. From month 2 onward 4 pen 11    furosemide (LASIX) 20 MG tablet Take 1 tablet (20 mg total) by mouth once daily. 90 tablet 3    levothyroxine (SYNTHROID) 100 MCG tablet Take 1 tablet (100 mcg total) by mouth before breakfast. 90 tablet 3    metFORMIN (GLUCOPHAGE-XR) 500 MG ER 24hr tablet Take 2 tablets  (1,000 mg total) by mouth 2 (two) times daily with meals. 360 tablet 3    oxybutynin (DITROPAN) 5 MG Tab Take 1 tablet (5 mg total) by mouth 2 (two) times daily. 60 tablet 0    rosuvastatin (CRESTOR) 10 MG tablet Take 1 tablet (10 mg total) by mouth nightly. 90 tablet 3    tamsulosin (FLOMAX) 0.4 mg Cap Take 1 capsule (0.4 mg total) by mouth once daily. 30 capsule 0    vitamin D (VITAMIN D3) 1000 units Tab        No current facility-administered medications for this visit.       Patient Care Team:  Clement Phillips MD as PCP - General (Internal Medicine)  Yuriy Amor MD as Consulting Physician (Internal Medicine)  Radha Calix MD (Pulmonary Disease)  Edmund Cantrell MD as Consulting Physician (Cardiothoracic Surgery)  Wilbert Arredondo MD (Cardiology)  Chris Muro MD as Consulting Physician (Ophthalmology)  [facility] (Gastroenterology)  Basilia Medley MA as Care Coordinator  David Bansal MD (Ophthalmology)  Hayley Pink MD (Hematology and Oncology)  Bonnie Francis as Digital Medicine Health   Clement Phillips MD as Diabetes Digital Medicine Responsible Provider (Internal Medicine)  Sofie Carrera, PharmD as Diabetes Digital Medicine Clinician (Pharmacist)  Medicare, [employer] Managed as Diabetes Digital Medicine Contract       - The patient was sent an After Visit Summary virtually that lists all medications with directions, allergies, education, orders placed during this encounter and follow-up instructions.      - I have reviewed the patient's medical information including past medical, family, and social history sections including the medications and allergies.      - We discussed the patient's current medications.     This note was created by combination of typed  and MModal dictation.  Transcription errors may be present.  If there are any questions, please contact me.       Conor Drew NP

## 2024-01-10 ENCOUNTER — LAB VISIT (OUTPATIENT)
Dept: LAB | Facility: HOSPITAL | Age: 74
End: 2024-01-10
Payer: MEDICARE

## 2024-01-10 DIAGNOSIS — R31.9 HEMATURIA, UNSPECIFIED TYPE: ICD-10-CM

## 2024-01-10 LAB
BACTERIA #/AREA URNS AUTO: ABNORMAL /HPF
BILIRUB UR QL STRIP: NEGATIVE
CLARITY UR REFRACT.AUTO: ABNORMAL
COLOR UR AUTO: YELLOW
GLUCOSE UR QL STRIP: NEGATIVE
HGB UR QL STRIP: ABNORMAL
HYALINE CASTS UR QL AUTO: 0 /LPF
KETONES UR QL STRIP: NEGATIVE
LEUKOCYTE ESTERASE UR QL STRIP: NEGATIVE
MICROSCOPIC COMMENT: ABNORMAL
NITRITE UR QL STRIP: NEGATIVE
PH UR STRIP: 5 [PH] (ref 5–8)
PROT UR QL STRIP: ABNORMAL
RBC #/AREA URNS AUTO: >100 /HPF (ref 0–4)
SP GR UR STRIP: 1.01 (ref 1–1.03)
URN SPEC COLLECT METH UR: ABNORMAL
WBC #/AREA URNS AUTO: 0 /HPF (ref 0–5)

## 2024-01-10 PROCEDURE — 81001 URINALYSIS AUTO W/SCOPE: CPT | Performed by: NURSE PRACTITIONER

## 2024-01-10 NOTE — PATIENT INSTRUCTIONS
Medical [facility]itness--[phone]  Imaging, Xray, CT, MRI, Ultrasound---[phone]  Bariatrics---[phone]  Breast Surgery---[phone]  Case Management---[phone]  Colonoscopy---[phone]  DME---[phone]  Infectious Disease---[phone]  Interventional Radiology---[phone]  Medical Records---[phone]  [facility] On [facility]---[phone]  Optometry/Ophthalmology---[phone]  [facility]---[phone]  Physical Therapy---[phone]  Psychiatry---[phone] or [phone]  Plastic Surgery---[phone]  Recovery--[phone] option 2, or [phone].  Sleep Study---[phone]  Smoking Cessation---[phone]  Wound Care---[phone]  Referral Desk---[phone]

## 2024-01-11 ENCOUNTER — PATIENT MESSAGE (OUTPATIENT)
Dept: FAMILY MEDICINE | Facility: CLINIC | Age: 74
End: 2024-01-11
Payer: MEDICARE

## 2024-01-11 NOTE — TELEPHONE ENCOUNTER
Advise,    I am feeling worse this morning. I guess I didnt pass it yet. Im drinking lots of water hoping it will move out. But it is more painful than before.

## 2024-01-12 ENCOUNTER — HOSPITAL ENCOUNTER (OUTPATIENT)
Dept: RADIOLOGY | Facility: HOSPITAL | Age: 74
Discharge: HOME | End: 2024-01-12
Attending: INTERNAL MEDICINE
Payer: MEDICARE

## 2024-01-12 ENCOUNTER — PATIENT MESSAGE (OUTPATIENT)
Dept: FAMILY MEDICINE | Facility: CLINIC | Age: 74
End: 2024-01-12
Payer: MEDICARE

## 2024-01-12 DIAGNOSIS — R10.9 ABDOMINAL PAIN, UNSPECIFIED ABDOMINAL LOCATION: Primary | ICD-10-CM

## 2024-01-12 DIAGNOSIS — N20.0 RIGHT KIDNEY STONE: ICD-10-CM

## 2024-01-12 DIAGNOSIS — R10.9 ABDOMINAL PAIN, UNSPECIFIED ABDOMINAL LOCATION: ICD-10-CM

## 2024-01-12 PROCEDURE — 74176 CT ABD & PELVIS W/O CONTRAST: CPT | Mod: TC

## 2024-01-12 PROCEDURE — 74176 CT ABD & PELVIS W/O CONTRAST: CPT | Mod: 26,,, | Performed by: RADIOLOGY

## 2024-01-12 RX ORDER — HYDROCODONE BITARTRATE AND ACETAMINOPHEN 5; 325 MG/1; MG/1
1 TABLET ORAL EVERY 8 HOURS PRN
Qty: 21 TABLET | Refills: 0 | Status: SHIPPED | OUTPATIENT
Start: 2024-01-12 | End: 2024-01-19

## 2024-01-12 NOTE — TELEPHONE ENCOUNTER
"You  Alvina FisherJust now (9:26 AM)       Hi Ms. Fisher,   Over the last several days I have communicated with my staff regarding your care. I have expressed concern that you need medical attention. I cannot simply prescribe antibiotics for the symptoms you are reporting, it is not in your best interest to avoid the recommendations that have been previously provided. You have been having symptoms that cannot be managed through the patient portal in this fashion. Your symptoms suggest you would be best evaluated in the emergency, I am concerned your heart rate has been 131, you need urgent medical evaluation as I have suggested over the past several days.        This Patient Portal message has not been read.     LUPE Coon9 minutes ago (9:17 AM)     RR  I will send your message to Dr. Rucker , however I strongly advise you go to the Emergency Room .     This Patient Portal message has not been read.     Alvina Chew Staff (supporting You)16 hours ago (5:05 PM)       Please advise if you think I should have a KUB x-ray or a CT. Still bleeding. Im feeling worse today. My heart rate has been fluctuating a lot too, from .        Alvina Chew Staff (supporting You)3 days ago       Thank you.    Messages that only contain a simple "Thank you" are hidden in In Basket to save you clicks.     LUPE Coon3 days ago     RR  Dr. Rucker is not in the office today, I will send her your message for her to advise.        Alvina Chew Staff (supporting You)3 days ago       When I last saw you,  you said to call the office if I got blood in my urine since we know I do have a stone. I called yesterday and they put me on the line with a nurse. She said to go to the ER. I dont think that is necessary. I just need some meds. I really like you and Im so disappointed that I havent heard from you. " Please respond. I called to make an appointment and I cant come until Jan 18th. Im on the wait list too. Please call [phone] or [phone]. Thank you.Alvina Fisher

## 2024-01-12 NOTE — PROGRESS NOTES
Punctate 1-2 mm stones lodged within the distal right ureter.  Stone at the lower pole of the right kidney is no longer seen.  There is no hydronephrosis.  No residual forming stones identified within the bilateral kidneys.  Cholecystectomy.  Hysterectomy.  Fat containing bilateral inguinal hernia    Patient was previously seen by NP, given tamsulosin and oxybutynin.  Results to patient via my chart.  She needs something stronger for pain I can send in hydrocodone

## 2024-01-18 ENCOUNTER — OFFICE VISIT (OUTPATIENT)
Dept: UROLOGY | Facility: CLINIC | Age: 74
End: 2024-01-18
Payer: MEDICARE

## 2024-01-18 ENCOUNTER — PATIENT MESSAGE (OUTPATIENT)
Dept: UROLOGY | Facility: CLINIC | Age: 74
End: 2024-01-18

## 2024-01-18 ENCOUNTER — TELEPHONE (OUTPATIENT)
Dept: UROLOGY | Facility: HOSPITAL | Age: 74
End: 2024-01-18
Payer: MEDICARE

## 2024-01-18 VITALS — BODY MASS INDEX: 32.65 KG/M2 | WEIGHT: 196.19 LBS

## 2024-01-18 DIAGNOSIS — N20.0 RECURRENT NEPHROLITHIASIS: ICD-10-CM

## 2024-01-18 DIAGNOSIS — R31.0 GROSS HEMATURIA: ICD-10-CM

## 2024-01-18 DIAGNOSIS — N20.1 LEFT URETERAL STONE: Primary | ICD-10-CM

## 2024-01-18 DIAGNOSIS — R30.0 DYSURIA: ICD-10-CM

## 2024-01-18 PROCEDURE — 99214 OFFICE O/P EST MOD 30 MIN: CPT | Mod: S$GLB,,, | Performed by: STUDENT IN AN ORGANIZED HEALTH CARE EDUCATION/TRAINING PROGRAM

## 2024-01-18 PROCEDURE — 87086 URINE CULTURE/COLONY COUNT: CPT | Performed by: STUDENT IN AN ORGANIZED HEALTH CARE EDUCATION/TRAINING PROGRAM

## 2024-01-18 PROCEDURE — 1125F AMNT PAIN NOTED PAIN PRSNT: CPT | Mod: CPTII,S$GLB,, | Performed by: STUDENT IN AN ORGANIZED HEALTH CARE EDUCATION/TRAINING PROGRAM

## 2024-01-18 PROCEDURE — 1159F MED LIST DOCD IN RCRD: CPT | Mod: CPTII,S$GLB,, | Performed by: STUDENT IN AN ORGANIZED HEALTH CARE EDUCATION/TRAINING PROGRAM

## 2024-01-18 PROCEDURE — 1160F RVW MEDS BY RX/DR IN RCRD: CPT | Mod: CPTII,S$GLB,, | Performed by: STUDENT IN AN ORGANIZED HEALTH CARE EDUCATION/TRAINING PROGRAM

## 2024-01-18 PROCEDURE — 99999 PR PBB SHADOW E&M-EST. PATIENT-LVL IV: CPT | Mod: PBBFAC,,, | Performed by: STUDENT IN AN ORGANIZED HEALTH CARE EDUCATION/TRAINING PROGRAM

## 2024-01-18 PROCEDURE — 3288F FALL RISK ASSESSMENT DOCD: CPT | Mod: CPTII,S$GLB,, | Performed by: STUDENT IN AN ORGANIZED HEALTH CARE EDUCATION/TRAINING PROGRAM

## 2024-01-18 PROCEDURE — 3008F BODY MASS INDEX DOCD: CPT | Mod: CPTII,S$GLB,, | Performed by: STUDENT IN AN ORGANIZED HEALTH CARE EDUCATION/TRAINING PROGRAM

## 2024-01-18 PROCEDURE — 1101F PT FALLS ASSESS-DOCD LE1/YR: CPT | Mod: CPTII,S$GLB,, | Performed by: STUDENT IN AN ORGANIZED HEALTH CARE EDUCATION/TRAINING PROGRAM

## 2024-01-18 RX ORDER — CEFDINIR 300 MG/1
300 CAPSULE ORAL EVERY 12 HOURS
Qty: 10 CAPSULE | Refills: 0 | Status: SHIPPED | OUTPATIENT
Start: 2024-01-18 | End: 2024-01-23

## 2024-01-18 RX ORDER — CEFAZOLIN SODIUM 2 G/50ML
2 SOLUTION INTRAVENOUS
Status: CANCELLED | OUTPATIENT
Start: 2024-01-18

## 2024-01-18 NOTE — TELEPHONE ENCOUNTER
----- Message from Clyde Douglas MD sent at 1/18/2024  2:36 PM CST -----  Regarding: RE: L distal ureteral stone  Called her, she told me she's seeing an outside urologist tomorrow. Did not want to get  a URS tomorrow with me.    ----- Message -----  From: Naina Rucker MD  Sent: 1/18/2024   2:19 PM CST  To: Lopez Zimmerman MD; #  Subject: L distal ureteral stone                          Anyone available to treat a distal L ureteral stone. Pt declined procedure for tomorrow 12pm when I saw her in clinic earlier today; she changed her mind shortly after but the time was given away by the OR pt does not want to wait until my next avail date of 1/29.

## 2024-01-18 NOTE — PROGRESS NOTES
Patient ID: Alvina Fisher is a 73 y.o. female.    Chief Complaint: Urolithaisis    Referral   HPI  Patient w/ hx of stones, noted to have gross hematuria, CT confirmed distal L ureteral stone. Patient started on flomax and oxybutynin for her LUTS. Patient here to discuss management. Notes dysuria. Denies fevers, chills. Has left flank pain. Sister w/ hx of stones. Patient has passed stones in the past.     Medically necessary ROS documented in HPI    Review of Systems   Constitutional:  Negative for chills and fever.   HENT:  Negative for congestion and sore throat.    Eyes:  Negative for blurred vision and redness.   Respiratory:  Negative for cough and shortness of breath.    Cardiovascular:  Negative for chest pain and leg swelling.   Gastrointestinal:  Negative for abdominal pain, blood in stool, constipation, nausea and vomiting.   Genitourinary:  Positive for flank pain and hematuria. Negative for dysuria, frequency and urgency.   Musculoskeletal:  Negative for back pain.   Skin:  Negative for rash.   Neurological:  Negative for dizziness and headaches.   Psychiatric/Behavioral:  Negative for depression. The patient is not nervous/anxious.          Past Medical History  Active Ambulatory Problems     Diagnosis Date Noted    Mixed incontinence urge and stress (male)(female) 03/05/2013    Cystocele 03/05/2013    Rectocele 03/05/2013    Chronic constipation 03/05/2013    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    Arthritis of knee, right 05/03/2019    Complex tear of medial meniscus of right knee as current injury 05/03/2019    Carcinoid tumor of lung 02/07/2020    Family history of heart disease Dr. Arredondo 02/07/2020    Other specified glaucoma 02/07/2020    Chronic midline low back pain without sciatica hx of laminectomy L5S1; flexeril PRN 02/07/2020    Left lumbar radiculopathy from L sciatica, remote laminectomy but residual weakness/numbness L leg 02/07/2020    Hepatitis C antibody test positive but VL  negative, either exposed/immune or false positive initial screening 02/17/2020    Type 2 diabetes mellitus with diabetic polyneuropathy, without long-term current use of insulin 06/25/2020    Hypothyroidism due to Hashimoto's thyroiditis 07/08/2020    Hyperplastic polyp of sigmoid colon 08/19/2020    Chronic superficial gastritis without bleeding on EGD 2017 08/19/2020    Dyslipidemia 09/21/2020    Fatty liver 02/16/2022    Class 1 obesity due to excess calories with serious comorbidity and body mass index (BMI) of 32.0 to 32.9 in adult 02/16/2022    Osteopenia of multiple sites 3/2022; repeat 2024 04/01/2022    Chronic heart failure with preserved ejection fraction 05/18/2022    Abdominal aortic atherosclerosis incidental on CT; on statin 03/27/2023    Urinary frequency 04/27/2023    Lack of coordination 04/27/2023    Pelvic floor weakness 04/27/2023    Severe obesity (BMI 35.0-39.9) with comorbidity 01/09/2024     Resolved Ambulatory Problems     Diagnosis Date Noted    Acute lateral meniscus tear of right knee 05/03/2019    Screening for osteoporosis 02/07/2020    Other specified hypothyroidism 07/08/2020    Gross hematuria 04/27/2023     Past Medical History:   Diagnosis Date    Achilles tendon tear     Coronary artery disease     Diabetes mellitus     Dysplasia of cervix     GERD (gastroesophageal reflux disease)     Glaucoma (increased eye pressure)     Hypertension     Lung nodule     Sinusitis     Sleep apnea     Thyroid disease     Urinary incontinence          Past Surgical History  Past Surgical History:   Procedure Laterality Date    ACHILLES TENDON SURGERY      ARTHROSCOPIC CHONDROPLASTY OF KNEE JOINT Right 5/3/2019    Procedure: ARTHROSCOPY, KNEE, WITH CHONDROPLASTY;  Surgeon: Doug Alexander MD;  Location: [facility] OR;  Service: Orthopedics;  Laterality: Right;    ARTHROSCOPY OF KNEE Right 5/3/2019    Procedure: ARTHROSCOPY, KNEE;  Surgeon: Doug Alexander MD;  Location: [facility];  Service: Orthopedics;   Laterality: Right;    CERVIX LESION DESTRUCTION      cryo x 2    CHOLECYSTECTOMY      EXCISION OF MEDIAL MENISCUS OF KNEE Right 5/3/2019    Procedure: MENISCECTOMY, KNEE, MEDIAL;  Surgeon: Doug Alexander MD;  Location: [facility] OR;  Service: Orthopedics;  Laterality: Right;    HYSTERECTOMY      BLAKE/ovaries remain    INJECTION OF JOINT Right 5/3/2019    Procedure: INJECTION, JOINT - SYNVISE INJECTION;  Surgeon: Doug Alexander MD;  Location: [facility] OR;  Service: Orthopedics;  Laterality: Right;  SYNVISE INJECTION FROM PHARMACY    LAMINECTOMY  1990    L5 S1    OOPHORECTOMY      TUBAL LIGATION         Social History  Social Connections: Unknown (1/9/2024)    Social Connection and Isolation Panel [NHANES]     Frequency of Communication with Friends and Family: More than three times a week     Frequency of Social Gatherings with Friends and Family: More than three times a week     Attends [employer] Services: Not on file     Active Member of Clubs or Organizations: Yes     Attends Club or Organization Meetings: More than 4 times per year     Marital Status:        Medications    Current Outpatient Medications:     acetaminophen (TYLENOL) 500 MG tablet, Take 500 mg by mouth every 6 (six) hours as needed for Pain., Disp: , Rfl:     albuterol (VENTOLIN HFA) 90 mcg/actuation inhaler, Inhale 2 puffs into the lungs every 6 (six) hours as needed for Wheezing. Rescue, Disp: 18 g, Rfl: 1    aspirin (ECOTRIN) 81 MG EC tablet, , Disp: , Rfl:     azelastine (ASTELIN) 137 mcg (0.1 %) nasal spray, 1 spray (137 mcg total) by Nasal route 2 (two) times daily., Disp: 30 mL, Rfl: 11    brimonidine 0.2% (ALPHAGAN) 0.2 % Drop, , Disp: , Rfl:     diazePAM (VALIUM) 5 MG tablet, Take 1 tablet (5 mg total) by mouth nightly as needed for Anxiety., Disp: 14 tablet, Rfl: 0    dorzolamide-timolol 2-0.5% (COSOPT) 22.3-6.8 mg/mL ophthalmic solution, , Disp: , Rfl:     dulaglutide (TRULICITY) 0.75 mg/0.5 mL pen injector, Inject 0.75 mg into the skin  every 7 days., Disp: 4 pen , Rfl: 2    dulaglutide (TRULICITY) 1.5 mg/0.5 mL pen injector, Inject 1.5 mg into the skin every 7 days. From month 2 onward, Disp: 4 pen , Rfl: 11    furosemide (LASIX) 20 MG tablet, Take 1 tablet (20 mg total) by mouth once daily., Disp: 90 tablet, Rfl: 3    HYDROcodone-acetaminophen (NORCO) 5-325 mg per tablet, Take 1 tablet by mouth every 8 (eight) hours as needed for Pain., Disp: 21 tablet, Rfl: 0    levothyroxine (SYNTHROID) 100 MCG tablet, Take 1 tablet (100 mcg total) by mouth before breakfast., Disp: 90 tablet, Rfl: 3    metFORMIN (GLUCOPHAGE-XR) 500 MG ER 24hr tablet, Take 2 tablets (1,000 mg total) by mouth 2 (two) times daily with meals., Disp: 360 tablet, Rfl: 3    rosuvastatin (CRESTOR) 10 MG tablet, Take 1 tablet (10 mg total) by mouth nightly., Disp: 90 tablet, Rfl: 3    tamsulosin (FLOMAX) 0.4 mg Cap, Take 1 capsule (0.4 mg total) by mouth once daily., Disp: 30 capsule, Rfl: 0    vitamin D (VITAMIN D3) 1000 units Tab, , Disp: , Rfl:     Allergies  Review of patient's allergies indicates:   Allergen Reactions    Oxycodone-acetaminophen Itching and Nausea Only     NOT ALLERGIC TO ACETAMINOPHEN, HAS TAKEN IT     Codeine Itching       Patient's PMH, FH, PSH, Social hx, Medications, allergies reviewed and updated as pertinent to today's visit.  Objective:      Physical Exam  Constitutional:       Appearance: She is well-developed.   HENT:      Head: Normocephalic and atraumatic.   Eyes:      Conjunctiva/sclera: Conjunctivae normal.   Pulmonary:      Effort: Pulmonary effort is normal. No respiratory distress.   Abdominal:      General: Abdomen is flat. There is no distension.      Palpations: Abdomen is soft. There is no mass.      Tenderness: There is no abdominal tenderness. There is no right CVA tenderness, left CVA tenderness or guarding.   Skin:     General: Skin is warm.      Findings: No rash.   Neurological:      Mental Status: She is alert and oriented to person,  place, and time.   Psychiatric:         Behavior: Behavior normal.           Imaging results: personally reviewed CT, previously obtained, left distal ureteral stone  Assessment:       1. Left ureteral stone    2. Gross hematuria    3. Dysuria        Plan:         Discussed MET w/ flomax for small stones    Discussed L ureteroscopic stone removal, laser lithotripsy, L retrograde pyelogram, L ureteral stent         Discussed importances of post op renal ultrasound to eval for residual stones, hydronephrosis     Discussed importance of post op stone prevention     Urine culture obtained, discussed procedure may be postponed for treatment of infection prior to intervention to decrease risk for sepsis/infection       Consider changing Vit D to every other day administration  Stone former's diet reviewed  Discussed 24 hr urine testing for the future    Patient would like to try to pass the stone and declined intervention offered for 1/19/24    Cefdinir for possible UTI, urine culture pending  POCT UA possible blood, neg nitrites, leukocytes

## 2024-01-20 LAB — BACTERIA UR CULT: NORMAL

## 2024-01-20 NOTE — PROGRESS NOTES
Please alert pt no infection. Please clarify if she intends to follow up with me on 1/29 or if she has found another urologist as Dr. Douglas mentioned when he called her several days ago. Thanks

## 2024-01-22 ENCOUNTER — TELEPHONE (OUTPATIENT)
Dept: UROLOGY | Facility: CLINIC | Age: 74
End: 2024-01-22
Payer: MEDICARE

## 2024-01-22 NOTE — TELEPHONE ENCOUNTER
----- Message from Naina Rucker MD sent at 1/20/2024 10:50 AM CST -----  Please alert pt no infection. Please clarify if she intends to follow up with me on 1/29 or if she has found another urologist as Dr. Douglas mentioned when he called her several days ago. Thanks

## 2024-01-23 ENCOUNTER — TELEPHONE (OUTPATIENT)
Dept: UROLOGY | Facility: HOSPITAL | Age: 74
End: 2024-01-23
Payer: MEDICARE

## 2024-01-23 NOTE — TELEPHONE ENCOUNTER
Called patient to offer sooner date, patient notes she passed two stones. She will follow up as needed

## 2024-03-08 ENCOUNTER — LAB VISIT (OUTPATIENT)
Dept: LAB | Facility: HOSPITAL | Age: 74
End: 2024-03-08
Attending: INTERNAL MEDICINE
Payer: MEDICARE

## 2024-03-08 DIAGNOSIS — E06.3 HYPOTHYROIDISM DUE TO HASHIMOTO'S THYROIDITIS: ICD-10-CM

## 2024-03-08 DIAGNOSIS — E11.42 TYPE 2 DIABETES MELLITUS WITH DIABETIC POLYNEUROPATHY, WITHOUT LONG-TERM CURRENT USE OF INSULIN: ICD-10-CM

## 2024-03-08 DIAGNOSIS — E78.5 DYSLIPIDEMIA: ICD-10-CM

## 2024-03-08 DIAGNOSIS — E03.8 HYPOTHYROIDISM DUE TO HASHIMOTO'S THYROIDITIS: ICD-10-CM

## 2024-03-08 LAB
ALBUMIN SERPL BCP-MCNC: 3.8 G/DL (ref 3.5–5.2)
ALP SERPL-CCNC: 82 U/L (ref 55–135)
ALT SERPL W/O P-5'-P-CCNC: 25 U/L (ref 10–44)
ANION GAP SERPL CALC-SCNC: 11 MMOL/L (ref 8–16)
AST SERPL-CCNC: 21 U/L (ref 10–40)
BILIRUB SERPL-MCNC: 0.4 MG/DL (ref 0.1–1)
BUN SERPL-MCNC: 15 MG/DL (ref 8–23)
CALCIUM SERPL-MCNC: 9.9 MG/DL (ref 8.7–10.5)
CHLORIDE SERPL-SCNC: 107 MMOL/L (ref 95–110)
CHOLEST SERPL-MCNC: 89 MG/DL (ref 120–199)
CHOLEST/HDLC SERPL: 2.5 {RATIO} (ref 2–5)
CO2 SERPL-SCNC: 24 MMOL/L (ref 23–29)
CREAT SERPL-MCNC: 0.8 MG/DL (ref 0.5–1.4)
ERYTHROCYTE [DISTWIDTH] IN BLOOD BY AUTOMATED COUNT: 13.2 % (ref 11.5–14.5)
EST. GFR  (NO RACE VARIABLE): >60 ML/MIN/1.73 M^2
ESTIMATED AVG GLUCOSE: 128 MG/DL (ref 68–131)
GLUCOSE SERPL-MCNC: 123 MG/DL (ref 70–110)
HBA1C MFR BLD: 6.1 % (ref 4–5.6)
HCT VFR BLD AUTO: 40.6 % (ref 37–48.5)
HDLC SERPL-MCNC: 36 MG/DL (ref 40–75)
HDLC SERPL: 40.4 % (ref 20–50)
HGB BLD-MCNC: 13.1 G/DL (ref 12–16)
LDLC SERPL CALC-MCNC: 36 MG/DL (ref 63–159)
MCH RBC QN AUTO: 29.6 PG (ref 27–31)
MCHC RBC AUTO-ENTMCNC: 32.3 G/DL (ref 32–36)
MCV RBC AUTO: 92 FL (ref 82–98)
NONHDLC SERPL-MCNC: 53 MG/DL
PLATELET # BLD AUTO: 201 K/UL (ref 150–450)
PMV BLD AUTO: 11.5 FL (ref 9.2–12.9)
POTASSIUM SERPL-SCNC: 4.5 MMOL/L (ref 3.5–5.1)
PROT SERPL-MCNC: 6.9 G/DL (ref 6–8.4)
RBC # BLD AUTO: 4.42 M/UL (ref 4–5.4)
SODIUM SERPL-SCNC: 142 MMOL/L (ref 136–145)
TRIGL SERPL-MCNC: 85 MG/DL (ref 30–150)
TSH SERPL DL<=0.005 MIU/L-ACNC: 0.61 UIU/ML (ref 0.4–4)
WBC # BLD AUTO: 7.91 K/UL (ref 3.9–12.7)

## 2024-03-08 PROCEDURE — 84443 ASSAY THYROID STIM HORMONE: CPT | Performed by: INTERNAL MEDICINE

## 2024-03-08 PROCEDURE — 36415 COLL VENOUS BLD VENIPUNCTURE: CPT | Mod: PO | Performed by: INTERNAL MEDICINE

## 2024-03-08 PROCEDURE — 80053 COMPREHEN METABOLIC PANEL: CPT | Performed by: INTERNAL MEDICINE

## 2024-03-08 PROCEDURE — 80061 LIPID PANEL: CPT | Performed by: INTERNAL MEDICINE

## 2024-03-08 PROCEDURE — 83036 HEMOGLOBIN GLYCOSYLATED A1C: CPT | Performed by: INTERNAL MEDICINE

## 2024-03-08 PROCEDURE — 85027 COMPLETE CBC AUTOMATED: CPT | Performed by: INTERNAL MEDICINE

## 2024-03-14 NOTE — PROGRESS NOTES
This note was created by combination of typed  and M-Modal dictation.  Transcription errors may be present.  If there are any questions, please contact me.    Assessment and Plan:   Assessment and Plan    Normal physical exam  Hyperplastic polyp of sigmoid colon    Severe obesity (BMI 35.0-39.9) with comorbidity  Type 2 diabetes mellitus with diabetic polyneuropathy, without long-term current use of insulin  -on metformin, she would like to try Mounjaro, history of Trulicity did give her some nausea, but the main issue was expense.  She is hopeful that not taking Entresto she will take longer to get into the donut hole.  Discussed strategies to minimize the potential GI side effects.  Cutting down on meal portions etc..  Fiber supplement.    Trial of Mounjaro sent to [facility].  -     Comprehensive Metabolic Panel; Future; Expected date: 09/14/2024  -     CBC Without Differential; Future; Expected date: 09/14/2024  -     Hemoglobin A1C; Future; Expected date: 09/14/2024  -     tirzepatide 7.5 mg/0.5 mL PnIj; Inject 7.5 mg into the skin every 7 days. 3rd month onward  Dispense: 4 Pen; Refill: 2  -     tirzepatide (MOUNJARO) 5 mg/0.5 mL PnIj; Inject 5 mg into the skin every 7 days. Second month  Dispense: 4 Pen; Refill: 0  -     tirzepatide (MOUNJARO) 2.5 mg/0.5 mL PnIj; Inject 2.5 mg into the skin every 7 days. First month  Dispense: 4 Pen; Refill: 0  -     metFORMIN (GLUCOPHAGE-XR) 500 MG ER 24hr tablet; Take 2 tablets (1,000 mg total) by mouth 2 (two) times daily with meals.  Dispense: 360 tablet; Refill: 3  -     Microalbumin/Creatinine Ratio, Urine; Future; Expected date: 09/15/2024    Chronic heart failure with preserved ejection fraction  -followed by outside cardiology.  History of Entresto too expensive, on valsartan low-dose.    Dyslipidemia  Abdominal aortic atherosclerosis incidental on CT; on statin  -pre visit labs good on low-dose rosuvastatin, no changes  -     rosuvastatin (CRESTOR)  10 MG tablet; Take 1 tablet (10 mg total) by mouth nightly.  Dispense: 90 tablet; Refill: 3      Hypothyroidism due to Hashimoto's thyroiditis  -pre visit labs good on current dose levothyroxine no changes  -     TSH; Future; Expected date: 09/14/2024  -     levothyroxine (SYNTHROID) 100 MCG tablet; Take 1 tablet (100 mcg total) by mouth before breakfast.  Dispense: 90 tablet; Refill: 3    Chronic superficial gastritis without bleeding on EGD 2017  -stable    Chronic midline low back pain without sciatica hx of laminectomy L5S1; flexeril PRN  -last PET-CT no evidence of neoplastic findings in the area.  With a known history of carcinoid tumor of the lung.  She is due for follow-up with Heme-Onc.    Adjustment disorder, unspecified type  -diazepam for p.r.n. use, when she gets her PET-CT scans  -     diazePAM (VALIUM) 5 MG tablet; Take 1 tablet (5 mg total) by mouth nightly as needed for Anxiety.  Dispense: 14 tablet; Refill: 0    Carcinoid tumor of lung, unspecified whether malignant  -routine follow-up with outside Heme-Onc    Medications Discontinued During This Encounter   Medication Reason    dulaglutide (TRULICITY) 0.75 mg/0.5 mL pen injector Cost of medication    dulaglutide (TRULICITY) 1.5 mg/0.5 mL pen injector Cost of medication    tamsulosin (FLOMAX) 0.4 mg Cap Therapy completed    rosuvastatin (CRESTOR) 10 MG tablet Reorder    levothyroxine (SYNTHROID) 100 MCG tablet Reorder    metFORMIN (GLUCOPHAGE-XR) 500 MG ER 24hr tablet Reorder    diazePAM (VALIUM) 5 MG tablet Reorder       meds sent this encounter:  Medications Ordered This Encounter   Medications    diazePAM (VALIUM) 5 MG tablet     Sig: Take 1 tablet (5 mg total) by mouth nightly as needed for Anxiety.     Dispense:  14 tablet     Refill:  0     Instead of klonopin    levothyroxine (SYNTHROID) 100 MCG tablet     Sig: Take 1 tablet (100 mcg total) by mouth before breakfast.     Dispense:  90 tablet     Refill:  3    metFORMIN (GLUCOPHAGE-XR) 500 MG  ER 24hr tablet     Sig: Take 2 tablets (1,000 mg total) by mouth 2 (two) times daily with meals.     Dispense:  360 tablet     Refill:  3     Increased dose    rosuvastatin (CRESTOR) 10 MG tablet     Sig: Take 1 tablet (10 mg total) by mouth nightly.     Dispense:  90 tablet     Refill:  3    tirzepatide (MOUNJARO) 2.5 mg/0.5 mL PnIj     Sig: Inject 2.5 mg into the skin every 7 days. First month     Dispense:  4 Pen     Refill:  0    tirzepatide (MOUNJARO) 5 mg/0.5 mL PnIj     Sig: Inject 5 mg into the skin every 7 days. Second month     Dispense:  4 Pen     Refill:  0    tirzepatide 7.5 mg/0.5 mL PnIj     Sig: Inject 7.5 mg into the skin every 7 days. 3rd month onward     Dispense:  4 Pen     Refill:  2         Follow Up:  Follow-up 6 months with labs  Future Appointments   Date Time Provider Department Center   2024  8:00 AM LABAYDEE [facility] LAB [place]   2024  8:15 AM SPECIMEN, JESI [facility] SPECLAB [place]   2024 10:40 AM Clement Phillips MD [facility]         Subjective:   Subjective   Chief Complaint   Patient presents with    Annual Exam       HPI  Alvina is a 73 y.o. female.    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 Patient has had a hysterectomy.    Last appointment with this clinic was 2024. Last visit with me 2023   To summarize last visit and events leading up to today:  Diabetes. Had previously taken Trulicity but stopped due to cost.  Rechallenge.  Stay on metformin.  HFpEF.  Followed by Cardiology.  Toprol-XL, Lasix, history of entresto but expensive.  Valsartan.    Dyslipidemia/statin  Carcinoid tumor of the lung followed by Hematology-Oncology  Chest wall pain after surgical resection.  Gabapentin.  Adjustment disorder p.r.n. diazepam  SAM/CPAP     She saw Heme-Onc in follow-up 4/10.  Status post PET-CT with decreasing PET avid lesion.     03/27/2023 CT urogram negative  04/24/2023 cystoscopy normal.  Referred to pelvic floor physical therapy     Saw pulmonology in follow-up 06/20/2023.    SAM/CPAP, not using regularly due to discomfort.  Encouraged to take regularly.  Maybe a factor in her fatigue.  Dyspnea improved on Entresto though the Entresto was expensive     Has follow-up with cardiology 9/19     04/10/2023 labs  Ferritin normal  Iron profile normal  CBC normal, mildly elevated eosinophil fraction   Vitamin-D normal  Pro BNP normal     TSH 2/28/23 WNL     Pre visit labs  CBC normal   CMP normal   A1c 5.7  Urine microalbumin normal    Last visit with me 09/12/2023   Right rotator tendinitis and epicondylitis.  Consider possibility of statin myalgias.    Diabetes, obesity, notes weight loss with Trulicity but has been off of it for a month due to cost.  Restarted.  Stay on metformin.  Fatigue.  Has SAM was not using CPAP every night.  Lab workup with Heme-Onc negative.  Pan CT scans were normal.  Carcinoid tumor lung, followed by Hematology-Oncology.    Saw cardiology 09/19/2023.  Stop Entresto and changed back to valsartan because of cost    Saw Heme-Onc 10/18/2023.  Stable.    Saw Saw Urology 10/20/2023.  UUI.  Pelvic floor physical therapy    Saw cardiology in follow-up 10/26/2023.  Not feeling good, blood pressure low, decreased valsartan dose    Saw nurse practitioner 01/09/2024 for dysuria hematuria.  Suspicious for stone.  She declined CT imaging.  Given Flomax and oxybutynin  Ultimately pursued CT scan showing right ureteral stones    Saw Urology in follow-up 01/18/2024.  Attempt to spontaneously pass.    She saw outside urology 1/19.  Plan was right  ureteroscopy and stone extraction    02/02/2024 saw cardiology in follow-up.  Stable.    02/05/2024 saw pulmonology in follow-up.  SAM not using CPAP acutely.  Blood pressure was high at that visit.  Restart the CPAP.  Dyspnea, stable.    Saw Urology in follow-up 02/27/2024.  Spontaneously passed the kidney stone.    Pre visit labs  CBC normal   CMP normal   Lipid profile good   A1c 6.1 from 5.7   TSH normal    Today's visit:  She reviewed her labs.  A1c did go up some though still less than 7.0.  Trulicity was expensive so she has not been taking it in awhile.  She would like to try mounjaro - daughter's MIL found it helpful and less nausea.      Eye Chris Muro at [facility].  Every 6 months.  Glaucoma.  And cataracts    R hip/lower back pain x about 2 years.  Gets regular PET/CT scans no neoplastic/suspicious findings.  Hurts with moving positions.      Patient Care Team:  Clement Phillips MD as PCP - General (Internal Medicine)  Yuriy Amor MD as Consulting Physician (Internal Medicine)  Radha Calix MD (Pulmonary Disease)  Edmund Cantrell MD as Consulting Physician (Cardiothoracic Surgery)  Wilbert Arredondo MD (Cardiology)  Chris Muro MD as Consulting Physician (Ophthalmology)  [facility] (Gastroenterology)  Basilia Medley MA as Care Coordinator  David Bansal MD (Ophthalmology)  Hayley Pink MD (Hematology and Oncology)  Bonnie Francis as Digital Medicine Health   Clement Phillips MD as Diabetes Digital Medicine Responsible Provider (Internal Medicine)  Sofie Carrera, PharmD as Diabetes Digital Medicine Clinician (Pharmacist)  Medicare [employer] Managed as Diabetes Digital Medicine Contract      Patient Active Problem List    Diagnosis Date Noted    Severe obesity (BMI 35.0-39.9) with comorbidity 01/09/2024    Urinary frequency 04/27/2023    Lack of coordination 04/27/2023    Pelvic floor weakness 04/27/2023    Abdominal  aortic atherosclerosis incidental on CT; on statin 03/27/2023    Chronic heart failure with preserved ejection fraction 05/18/2022    Osteopenia of multiple sites 3/2022; repeat 2024 04/01/2022 03/31/2022 DEXA L-spine 1.2, total hip-1.2, femoral neck-1.5.  FRAX score 1.2/9.1%.  Osteopenia.  Compared to previous, decrease in BMD at lumbar spine and total hip.      Fatty liver 02/16/2022 09/08/2021 right upper quadrant ultrasound  Previous cholecystectomy. No biliary ductal dilatation is seen.  Diffusely increased parenchymal echogenicity of the liver most consistent with steatosis. There is no sonographic evidence of cirrhosis and no focal liver lesions are seen.      Class 1 obesity due to excess calories with serious comorbidity and body mass index (BMI) of 32.0 to 32.9 in adult 02/16/2022    Dyslipidemia 09/21/2020     10/26/2021 TTE LV normal size and thickness.  Normal LV systolic function LVEF 60-65%.  Normal diastolic function. RV normal size and systolic function.  10/26/2021 nuclear medicine stress test abnormal moderate size moderate intensity partially reversible defect mid distal anterior wall.  11/15/2021 left heart catheterization no evidence of angiographically significant coronary artery disease.  By report, elevated EDP.      Hyperplastic polyp of sigmoid colon 08/19/2020 8/31/2017 Colonoscopy 3 mm rectal polyp.  Large internal hemorrhoids.  Hyperplastic polyp      Chronic superficial gastritis without bleeding on EGD 2017 08/19/2020 8/31/2017 EGD normal esophagus.  Patchy mild erythema of antrum.  Biopsied.  Normal duodenum.  Biopsy mild chronic gastritis H pylori negative      Hypothyroidism due to Hashimoto's thyroiditis 07/08/2020    Type 2 diabetes mellitus with diabetic polyneuropathy, without long-term current use of insulin 06/25/2020    Hepatitis C antibody test positive but VL negative, either exposed/immune or false positive initial screening 02/17/2020    Carcinoid tumor  of lung 02/07/2020     History of left pulmonary nodule.  Seen by outside CT surgery.  Had previously been sent to intervention Radiology but was unable to biopsy the lesion.  Increased in size from initial 0.7-1.3 cm over 3 years.    9/12/19 CT-guided biopsy:  LUNG, LEFT LOWER LOBE, CORE BIOPSY:   - SMALL FRAGMENTS OF BENIGN PULMONARY ALVEOLAR PARENCHYMA.   - NO EVIDENCE OF NEOPLASM OR GRANULOMATOUS DISEASE.   6/11/20 lung bx  LUNG, NEEDLE BIOPSY, CLINICALLY LEFT LOWER LOBE:  --ATYPICAL ADENOMATOUS HYPERPLASIA ( 0.5 MM LESION, AS MEASURED ON   SLIDE).   She underwent on July 31, 2020 a left lower lobe wedge resection of the mass with completion robotic lobectomy and mediastinal lymph node dissection.  The final pathology report showed the presence of low-grade carcinoid tumor measuring 1.20 cm that was low-grade with a Ki-67 of 3%. The margins were negative. The lymph node at the level 7, 8, 10 and 11 came negative for metastatic disease. There was no direct invasion of the adjacent structure no lymphovascular invasion. Her final stage was a pT1 pN0 pMX.  9/10/20 PET/CT neuroendocrine study:  1.   Postoperative changes of recent left lower lobe pulmonary nodule wedge resection with mild metabolic activity along the lateral margin of the suture line likely being postoperative in nature. Continued attention at this site is warranted on future imaging.  2.   No evidence of metastatic disease identified.  3/23/22 PET CT 1.   Stable postsurgical changes in the left lung without evidence of local recurrence or distant metastatic disease identified.      Family history of heart disease Dr. Arredondo 02/07/2020     10/03/2013 nuclear medicine stress test no significant EKG changes.  No chest pain. Normal perfusion scan.  Normal LV systolic function  10/03/2013 TTE normal LV size wall thickness and systolic function.  05/19/17 Nuclear stress test: Five minutes standard Dario protocol. 1 mm horizontal ST depression. Substernal  chest heaviness and tightness, resolved in recovery. Perfusion scan normal.  05/17 Echocardiogram shows normal cardiac function. EF normal. Greater than 50%. Grade I diastolic function.  02/02/18 Coronary calcium score was 46 with 41 in the LAD, circumflex was 5. Her liver measured 41 Hounsfield units. The spleen could not be visualized. These findings are consistent with fatty liver. The textural appearance to me of the liver was that of fatty liver. She had normal origin of the coronary arteries, normal configuration of the pulmonary veins, left atrium 35 mm, calcified plaque was also seen in the aortic annulus. Per Radiology, she had a 1 cm nodule in the left lower lobe. Radiology recommended followup in six months with CT.  6/22/20 nu med stress test  Nuclear scans show a small area of possible anterior wall ischemia. This could also be secondary to breast artefact. Clinical      correlation suggested.     Gated scan is wnl. EF 80%     Ekg portion of test is negative         Other specified glaucoma 02/07/2020    Chronic midline low back pain without sciatica hx of laminectomy L5S1; flexeril PRN 02/07/2020    Left lumbar radiculopathy from L sciatica, remote laminectomy but residual weakness/numbness L leg 02/07/2020    Arthritis of knee, right 05/03/2019    Complex tear of medial meniscus of right knee as current injury 05/03/2019    Mixed incontinence urge and stress (male)(female) 03/05/2013    Cystocele 03/05/2013    Rectocele 03/05/2013    Chronic constipation 03/05/2013    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       PAST MEDICAL PROBLEMS, PAST SURGICAL HISTORY: please see relevant portions of the electronic medical record    ALLERGIES AND MEDICATIONS: updated and reviewed.  Medication List with Changes/Refills   Current Medications    ACETAMINOPHEN (TYLENOL) 500 MG TABLET    Take 500 mg by mouth every 6 (six) hours as needed for Pain.    ALBUTEROL (VENTOLIN HFA) 90 MCG/ACTUATION INHALER    Inhale 2 puffs into  "the lungs every 6 (six) hours as needed for Wheezing. Rescue    ASPIRIN (ECOTRIN) 81 MG EC TABLET        AZELASTINE (ASTELIN) 137 MCG (0.1 %) NASAL SPRAY    1 spray (137 mcg total) by Nasal route 2 (two) times daily.    BRIMONIDINE 0.2% (ALPHAGAN) 0.2 % DROP        DIAZEPAM (VALIUM) 5 MG TABLET    Take 1 tablet (5 mg total) by mouth nightly as needed for Anxiety.    DORZOLAMIDE-TIMOLOL 2-0.5% (COSOPT) 22.3-6.8 MG/ML OPHTHALMIC SOLUTION        DULAGLUTIDE (TRULICITY) 0.75 MG/0.5 ML PEN INJECTOR    Inject 0.75 mg into the skin every 7 days.    DULAGLUTIDE (TRULICITY) 1.5 MG/0.5 ML PEN INJECTOR    Inject 1.5 mg into the skin every 7 days. From month 2 onward    FUROSEMIDE (LASIX) 20 MG TABLET    Take 1 tablet (20 mg total) by mouth once daily.    LEVOTHYROXINE (SYNTHROID) 100 MCG TABLET    Take 1 tablet (100 mcg total) by mouth before breakfast.    METFORMIN (GLUCOPHAGE-XR) 500 MG ER 24HR TABLET    Take 2 tablets (1,000 mg total) by mouth 2 (two) times daily with meals.    ROSUVASTATIN (CRESTOR) 10 MG TABLET    Take 1 tablet (10 mg total) by mouth nightly.    TAMSULOSIN (FLOMAX) 0.4 MG CAP    Take 1 capsule (0.4 mg total) by mouth once daily.    VITAMIN D (VITAMIN D3) 1000 UNITS TAB             Objective:   Objective   Physical Exam   Vitals:    03/15/24 1022   BP: 118/76   Pulse: 78   Temp: 97.8 °F (36.6 °C)   TempSrc: Oral   SpO2: 98%   Weight: 88.5 kg (195 lb)   Height: 5' 5" (1.651 m)    Body mass index is 32.45 kg/m².  Weight: 88.5 kg (195 lb)   Height: 5' 5" (165.1 cm)     Physical Exam  Constitutional:       Appearance: She is well-developed.   HENT:      Right Ear: Tympanic membrane, ear canal and external ear normal.      Left Ear: Tympanic membrane, ear canal and external ear normal.   Eyes:      General: No scleral icterus.     Extraocular Movements: Extraocular movements intact.      Pupils: Pupils are equal, round, and reactive to light.   Cardiovascular:      Rate and Rhythm: Normal rate and regular " rhythm.      Heart sounds: Normal heart sounds. No murmur heard.  Pulmonary:      Effort: Pulmonary effort is normal.      Breath sounds: Normal breath sounds. No wheezing.   Abdominal:      Palpations: Abdomen is soft. There is no hepatomegaly, splenomegaly or mass.      Tenderness: There is no abdominal tenderness.   Musculoskeletal:         General: No deformity. Normal range of motion.      Cervical back: Neck supple.      Right lower leg: No edema.      Left lower leg: No edema.      Comments: Notes some tenderness on palpation of the iliac crest on the right   Lymphadenopathy:      Cervical: No cervical adenopathy.   Skin:     General: Skin is warm and dry.      Findings: No rash.      Comments: On exposed skin   Neurological:      Mental Status: She is alert and oriented to person, place, and time.      Deep Tendon Reflexes: Reflexes are normal and symmetric.   Psychiatric:         Behavior: Behavior normal.         Thought Content: Thought content normal.         Judgment: Judgment normal.         Component      Latest Ref Rn 9/5/2023 3/8/2024   WBC      3.90 - 12.70 K/uL 7.11  7.91    RBC      4.00 - 5.40 M/uL 4.49  4.42    Hemoglobin      12.0 - 16.0 g/dL 13.4  13.1    Hematocrit      37.0 - 48.5 % 41.4  40.6    MCV      82 - 98 fL 92  92    MCH      27.0 - 31.0 pg 29.8  29.6    MCHC      32.0 - 36.0 g/dL 32.4  32.3    RDW      11.5 - 14.5 % 13.2  13.2    Platelet Count      150 - 450 K/uL 197  201    MPV      9.2 - 12.9 fL 11.3  11.5    Immature Granulocytes      0.0 - 0.5 % 0.1     Gran # (ANC)      1.8 - 7.7 K/uL 2.6     Immature Grans (Abs)      0.00 - 0.04 K/uL 0.01     Lymph #      1.0 - 4.8 K/uL 3.2     Mono #      0.3 - 1.0 K/uL 0.6     Eos #      0.0 - 0.5 K/uL 0.6 (H)     Baso #      0.00 - 0.20 K/uL 0.08     nRBC      0 /100 WBC 0     Gran %      38.0 - 73.0 % 36.5 (L)     Lymph %      18.0 - 48.0 % 45.3     Mono %      4.0 - 15.0 % 8.7     Eos %      0.0 - 8.0 % 8.3 (H)     Basophil %      0.0  - 1.9 % 1.1     Differential Method Automated     Sodium      136 - 145 mmol/L 140  142    Potassium      3.5 - 5.1 mmol/L 4.0  4.5    Chloride      95 - 110 mmol/L 107  107    CO2      23 - 29 mmol/L 24  24    Glucose      70 - 110 mg/dL 117 (H)  123 (H)    BUN      8 - 23 mg/dL 14  15    Creatinine      0.5 - 1.4 mg/dL 0.7  0.8    Calcium      8.7 - 10.5 mg/dL 9.3  9.9    PROTEIN TOTAL      6.0 - 8.4 g/dL 6.9  6.9    Albumin      3.5 - 5.2 g/dL 3.9  3.8    BILIRUBIN TOTAL      0.1 - 1.0 mg/dL 0.5  0.4    ALP      55 - 135 U/L 59  82    AST      10 - 40 U/L 27  21    ALT      10 - 44 U/L 31  25    eGFR      >60 mL/min/1.73 m^2 >60.0  >60.0    Anion Gap      8 - 16 mmol/L 9  11    Cholesterol Total      120 - 199 mg/dL  89 (L)    Triglycerides      30 - 150 mg/dL  85    HDL      40 - 75 mg/dL  36 (L)    LDL Cholesterol      63.0 - 159.0 mg/dL  36.0 (L)    HDL/Cholesterol Ratio      20.0 - 50.0 %  40.4    Total Cholesterol/HDL Ratio      2.0 - 5.0   2.5    Non-HDL Cholesterol      mg/dL  53    Urine Microalbumin      ug/mL 19.0     Urine Creatinine      15.0 - 325.0 mg/dL 219.0     MICROALB/CREAT RATIO      0.0 - 30.0 ug/mg 8.7     Hemoglobin A1C External      4.0 - 5.6 % 5.7 (H)  6.1 (H)    Estimated Avg Glucose      68 - 131 mg/dL 117  128    TSH      0.400 - 4.000 uIU/mL  0.613       Legend:  (H) High  (L) Low

## 2024-03-15 ENCOUNTER — OFFICE VISIT (OUTPATIENT)
Dept: FAMILY MEDICINE | Facility: CLINIC | Age: 74
End: 2024-03-15
Payer: MEDICARE

## 2024-03-15 VITALS
SYSTOLIC BLOOD PRESSURE: 118 MMHG | OXYGEN SATURATION: 98 % | BODY MASS INDEX: 32.49 KG/M2 | HEIGHT: 65 IN | HEART RATE: 78 BPM | DIASTOLIC BLOOD PRESSURE: 76 MMHG | WEIGHT: 195 LBS | TEMPERATURE: 98 F

## 2024-03-15 DIAGNOSIS — E11.42 TYPE 2 DIABETES MELLITUS WITH DIABETIC POLYNEUROPATHY, WITHOUT LONG-TERM CURRENT USE OF INSULIN: ICD-10-CM

## 2024-03-15 DIAGNOSIS — E06.3 HYPOTHYROIDISM DUE TO HASHIMOTO'S THYROIDITIS: ICD-10-CM

## 2024-03-15 DIAGNOSIS — I50.32 CHRONIC HEART FAILURE WITH PRESERVED EJECTION FRACTION: ICD-10-CM

## 2024-03-15 DIAGNOSIS — I70.0 ABDOMINAL AORTIC ATHEROSCLEROSIS: ICD-10-CM

## 2024-03-15 DIAGNOSIS — E66.01 SEVERE OBESITY (BMI 35.0-39.9) WITH COMORBIDITY: ICD-10-CM

## 2024-03-15 DIAGNOSIS — K29.30 CHRONIC SUPERFICIAL GASTRITIS WITHOUT BLEEDING: ICD-10-CM

## 2024-03-15 DIAGNOSIS — F43.20 ADJUSTMENT DISORDER, UNSPECIFIED TYPE: ICD-10-CM

## 2024-03-15 DIAGNOSIS — Z00.00 NORMAL PHYSICAL EXAM: Primary | ICD-10-CM

## 2024-03-15 DIAGNOSIS — G89.29 CHRONIC MIDLINE LOW BACK PAIN WITHOUT SCIATICA: ICD-10-CM

## 2024-03-15 DIAGNOSIS — M54.50 CHRONIC MIDLINE LOW BACK PAIN WITHOUT SCIATICA: ICD-10-CM

## 2024-03-15 DIAGNOSIS — E03.8 HYPOTHYROIDISM DUE TO HASHIMOTO'S THYROIDITIS: ICD-10-CM

## 2024-03-15 DIAGNOSIS — D3A.090 CARCINOID TUMOR OF LUNG, UNSPECIFIED WHETHER MALIGNANT: ICD-10-CM

## 2024-03-15 DIAGNOSIS — K63.5 HYPERPLASTIC POLYP OF SIGMOID COLON: ICD-10-CM

## 2024-03-15 DIAGNOSIS — E78.5 DYSLIPIDEMIA: ICD-10-CM

## 2024-03-15 PROCEDURE — 1159F MED LIST DOCD IN RCRD: CPT | Mod: CPTII,S$GLB,, | Performed by: INTERNAL MEDICINE

## 2024-03-15 PROCEDURE — 1160F RVW MEDS BY RX/DR IN RCRD: CPT | Mod: CPTII,S$GLB,, | Performed by: INTERNAL MEDICINE

## 2024-03-15 PROCEDURE — 3288F FALL RISK ASSESSMENT DOCD: CPT | Mod: CPTII,S$GLB,, | Performed by: INTERNAL MEDICINE

## 2024-03-15 PROCEDURE — 99397 PER PM REEVAL EST PAT 65+ YR: CPT | Mod: S$GLB,,, | Performed by: INTERNAL MEDICINE

## 2024-03-15 PROCEDURE — 4010F ACE/ARB THERAPY RXD/TAKEN: CPT | Mod: CPTII,S$GLB,, | Performed by: INTERNAL MEDICINE

## 2024-03-15 PROCEDURE — 99999 PR PBB SHADOW E&M-EST. PATIENT-LVL IV: CPT | Mod: PBBFAC,,, | Performed by: INTERNAL MEDICINE

## 2024-03-15 PROCEDURE — 3078F DIAST BP <80 MM HG: CPT | Mod: CPTII,S$GLB,, | Performed by: INTERNAL MEDICINE

## 2024-03-15 PROCEDURE — 3044F HG A1C LEVEL LT 7.0%: CPT | Mod: CPTII,S$GLB,, | Performed by: INTERNAL MEDICINE

## 2024-03-15 PROCEDURE — 1126F AMNT PAIN NOTED NONE PRSNT: CPT | Mod: CPTII,S$GLB,, | Performed by: INTERNAL MEDICINE

## 2024-03-15 PROCEDURE — 3074F SYST BP LT 130 MM HG: CPT | Mod: CPTII,S$GLB,, | Performed by: INTERNAL MEDICINE

## 2024-03-15 PROCEDURE — 1101F PT FALLS ASSESS-DOCD LE1/YR: CPT | Mod: CPTII,S$GLB,, | Performed by: INTERNAL MEDICINE

## 2024-03-15 PROCEDURE — 3008F BODY MASS INDEX DOCD: CPT | Mod: CPTII,S$GLB,, | Performed by: INTERNAL MEDICINE

## 2024-03-15 RX ORDER — ROSUVASTATIN CALCIUM 10 MG/1
10 TABLET, COATED ORAL NIGHTLY
Qty: 90 TABLET | Refills: 3 | Status: SHIPPED | OUTPATIENT
Start: 2024-03-15

## 2024-03-15 RX ORDER — DIAZEPAM 5 MG/1
5 TABLET ORAL NIGHTLY PRN
Qty: 14 TABLET | Refills: 0 | Status: SHIPPED | OUTPATIENT
Start: 2024-03-15

## 2024-03-15 RX ORDER — LEVOTHYROXINE SODIUM 100 UG/1
100 TABLET ORAL
Qty: 90 TABLET | Refills: 3 | Status: SHIPPED | OUTPATIENT
Start: 2024-03-15 | End: 2025-03-15

## 2024-03-15 RX ORDER — TIRZEPATIDE 2.5 MG/.5ML
2.5 INJECTION, SOLUTION SUBCUTANEOUS
Qty: 4 PEN | Refills: 0 | Status: SHIPPED | OUTPATIENT
Start: 2024-03-15 | End: 2024-05-23 | Stop reason: ALTCHOICE

## 2024-03-15 RX ORDER — METFORMIN HYDROCHLORIDE 500 MG/1
1000 TABLET, EXTENDED RELEASE ORAL 2 TIMES DAILY WITH MEALS
Qty: 360 TABLET | Refills: 3 | Status: SHIPPED | OUTPATIENT
Start: 2024-03-15

## 2024-03-15 RX ORDER — VALSARTAN 40 MG/1
1 TABLET ORAL 2 TIMES DAILY
COMMUNITY
Start: 2024-02-02

## 2024-03-15 RX ORDER — TIRZEPATIDE 5 MG/.5ML
5 INJECTION, SOLUTION SUBCUTANEOUS
Qty: 4 PEN | Refills: 0 | Status: SHIPPED | OUTPATIENT
Start: 2024-04-15 | End: 2024-05-23 | Stop reason: SDUPTHER

## 2024-04-04 ENCOUNTER — PATIENT MESSAGE (OUTPATIENT)
Dept: FAMILY MEDICINE | Facility: CLINIC | Age: 74
End: 2024-04-04
Payer: MEDICARE

## 2024-05-01 DIAGNOSIS — Z78.0 MENOPAUSE: ICD-10-CM

## 2024-05-16 ENCOUNTER — HOSPITAL ENCOUNTER (OUTPATIENT)
Dept: RADIOLOGY | Facility: CLINIC | Age: 74
Discharge: HOME | End: 2024-05-16
Attending: INTERNAL MEDICINE
Payer: MEDICARE

## 2024-05-16 DIAGNOSIS — Z78.0 MENOPAUSE: ICD-10-CM

## 2024-05-16 PROCEDURE — 77080 DXA BONE DENSITY AXIAL: CPT | Mod: TC,PO

## 2024-05-16 PROCEDURE — 77080 DXA BONE DENSITY AXIAL: CPT | Mod: 26,,, | Performed by: INTERNAL MEDICINE

## 2024-05-20 ENCOUNTER — PATIENT MESSAGE (OUTPATIENT)
Dept: FAMILY MEDICINE | Facility: CLINIC | Age: 74
End: 2024-05-20
Payer: MEDICARE

## 2024-05-20 DIAGNOSIS — Z12.31 BREAST CANCER SCREENING BY MAMMOGRAM: Primary | ICD-10-CM

## 2024-05-23 ENCOUNTER — HOSPITAL ENCOUNTER (OUTPATIENT)
Dept: RADIOLOGY | Facility: HOSPITAL | Age: 74
Discharge: HOME | End: 2024-05-23
Attending: INTERNAL MEDICINE
Payer: MEDICARE

## 2024-05-23 DIAGNOSIS — R93.7 ABNORMAL BONE DENSITY SCREENING: Primary | ICD-10-CM

## 2024-05-23 DIAGNOSIS — E11.42 TYPE 2 DIABETES MELLITUS WITH DIABETIC POLYNEUROPATHY, WITHOUT LONG-TERM CURRENT USE OF INSULIN: ICD-10-CM

## 2024-05-23 DIAGNOSIS — R93.7 ABNORMAL BONE DENSITY SCREENING: ICD-10-CM

## 2024-05-23 PROBLEM — M41.25 OTHER IDIOPATHIC SCOLIOSIS, THORACOLUMBAR REGION: Status: ACTIVE | Noted: 2024-05-23

## 2024-05-23 PROCEDURE — 72100 X-RAY EXAM L-S SPINE 2/3 VWS: CPT | Mod: TC,FY,PO

## 2024-05-23 PROCEDURE — 72100 X-RAY EXAM L-S SPINE 2/3 VWS: CPT | Mod: 26,,, | Performed by: RADIOLOGY

## 2024-05-23 RX ORDER — TIRZEPATIDE 2.5 MG/.5ML
2.5 INJECTION, SOLUTION SUBCUTANEOUS
Qty: 12 PEN | Refills: 1 | OUTPATIENT
Start: 2024-05-23

## 2024-05-23 NOTE — TELEPHONE ENCOUNTER
No care due was identified.  [facility] Embedded Care Due Messages. Reference number: [id number].   5/23/2024 2:34:46 PM CDT

## 2024-05-23 NOTE — PROGRESS NOTES
Considerable scoliosis, convex toward the left at the thoracolumbar junction and toward the right at the lower lumbar level.  Vertebral body heights appear to be adequately maintained.   There appears to be severe hypertrophic spurring and disc space narrowing at the T12-L1 and L1-2 levels.  Probable mild disc space narrowing at the L2-3 and L5-S1 levels.Multilevel facet arthropathy.  No definite evidence of acute fracture or osseous destruction.    Results to patient via mychart.  Osteopenia not osteoporosis.  Repeat 2 years

## 2024-05-23 NOTE — PROGRESS NOTES
05/16/2024 DEXA L-spine 1.1, femoral neck -2.0, total hip -1.3.  FRAX score 2.2/11%.  Compared to previous DEXA BMD stable.  Wide variation in BMD between L2 and L3.  Osteopenia.    Results to patient via mychart.  Would check plain film x-ray of the lumbar spine.  If no occult fracture, no further pursuit.

## 2024-05-24 RX ORDER — TIRZEPATIDE 5 MG/.5ML
5 INJECTION, SOLUTION SUBCUTANEOUS
Qty: 4 PEN | Refills: 0 | Status: SHIPPED | OUTPATIENT
Start: 2024-05-24 | End: 2024-07-03

## 2024-06-12 DIAGNOSIS — I50.32 CHRONIC HEART FAILURE WITH PRESERVED EJECTION FRACTION: ICD-10-CM

## 2024-06-12 RX ORDER — FUROSEMIDE 20 MG/1
20 TABLET ORAL
Qty: 90 TABLET | Refills: 2 | Status: SHIPPED | OUTPATIENT
Start: 2024-06-12

## 2024-06-12 NOTE — TELEPHONE ENCOUNTER
Care Due:                  Date            Visit Type   Department     Provider  --------------------------------------------------------------------------------                                [facility]                              PRIMARY      MED/ INTERNAL  Last Visit: 03-      CARE ([facility])   MED/ ROGER Phillips                              [facility]                              PRIMARY      MED/ INTERNAL  Next Visit: 09-      CARE ([facility])   MED/ ROGER Phillips                                                            Last  Test          Frequency    Reason                     Performed    Due Date  --------------------------------------------------------------------------------    HBA1C.......  6 months...  metFORMIN, tirzepatide...  03- 09-    [facility] Embedded Care Due Messages. Reference number: [id number].   6/12/2024 12:27:25 PM CDT

## 2024-06-12 NOTE — TELEPHONE ENCOUNTER
Refill Decision Note   Alvina Fisher  is requesting a refill authorization.    Brief Assessment and Rationale for Refill:   Approve       Medication Therapy Plan:   FLOS      Comments:     Note composed:12:31 PM 06/12/2024

## 2024-06-27 ENCOUNTER — PATIENT MESSAGE (OUTPATIENT)
Dept: ADMINISTRATIVE | Facility: OTHER | Age: 74
End: 2024-06-27
Payer: MEDICARE

## 2024-06-27 DIAGNOSIS — E11.42 TYPE 2 DIABETES MELLITUS WITH DIABETIC POLYNEUROPATHY, WITHOUT LONG-TERM CURRENT USE OF INSULIN: ICD-10-CM

## 2024-06-27 RX ORDER — TIRZEPATIDE 5 MG/.5ML
5 INJECTION, SOLUTION SUBCUTANEOUS
Qty: 12 PEN | Refills: 0 | Status: SHIPPED | OUTPATIENT
Start: 2024-06-27

## 2024-06-27 NOTE — TELEPHONE ENCOUNTER
Refill Decision Note   Alvina Fisher  is requesting a refill authorization.  Brief Assessment and Rationale for Refill:  Approve     Medication Therapy Plan:         Comments:     Note composed:7:48 AM 06/27/2024

## 2024-06-27 NOTE — TELEPHONE ENCOUNTER
No care due was identified.  [facility] Embedded Care Due Messages. Reference number: [id number].   6/27/2024 7:47:29 AM CDT

## 2024-07-01 ENCOUNTER — HOSPITAL ENCOUNTER (OUTPATIENT)
Dept: RADIOLOGY | Facility: HOSPITAL | Age: 74
Discharge: HOME | End: 2024-07-01
Attending: INTERNAL MEDICINE
Payer: MEDICARE

## 2024-07-01 DIAGNOSIS — Z12.31 BREAST CANCER SCREENING BY MAMMOGRAM: ICD-10-CM

## 2024-07-01 PROCEDURE — 77067 SCR MAMMO BI INCL CAD: CPT | Mod: TC,PO

## 2024-09-01 ENCOUNTER — PATIENT MESSAGE (OUTPATIENT)
Dept: ADMINISTRATIVE | Facility: HOSPITAL | Age: 74
End: 2024-09-01
Payer: MEDICARE

## 2024-09-03 ENCOUNTER — PATIENT OUTREACH (OUTPATIENT)
Dept: ADMINISTRATIVE | Facility: HOSPITAL | Age: 74
End: 2024-09-03
Payer: MEDICARE

## 2024-09-15 ENCOUNTER — PATIENT MESSAGE (OUTPATIENT)
Dept: FAMILY MEDICINE | Facility: CLINIC | Age: 74
End: 2024-09-15
Payer: MEDICARE

## 2024-09-15 DIAGNOSIS — E11.42 TYPE 2 DIABETES MELLITUS WITH DIABETIC POLYNEUROPATHY, WITHOUT LONG-TERM CURRENT USE OF INSULIN: Primary | ICD-10-CM

## 2024-09-17 NOTE — TELEPHONE ENCOUNTER
Can we call [facility] and see if they have those orders?  CBC, CMP, lipid, A1c, and urine microalbumin

## 2024-09-18 LAB
ALBUMIN SERPL-MCNC: 4.2 G/DL (ref 3.6–5.1)
ALBUMIN/GLOB SERPL: 1.6 (CALC) (ref 1–2.5)
ALP SERPL-CCNC: 48 U/L (ref 37–153)
ALT SERPL-CCNC: 21 U/L (ref 6–29)
AST SERPL-CCNC: 19 U/L (ref 10–35)
BASOPHILS # BLD AUTO: 61 CELLS/UL (ref 0–200)
BASOPHILS NFR BLD AUTO: 1 %
BILIRUB SERPL-MCNC: 0.7 MG/DL (ref 0.2–1.2)
BUN SERPL-MCNC: 17 MG/DL (ref 7–25)
BUN/CREAT SERPL: NORMAL (CALC) (ref 6–22)
CALCIUM SERPL-MCNC: 9.3 MG/DL (ref 8.6–10.4)
CHLORIDE SERPL-SCNC: 105 MMOL/L (ref 98–110)
CHOLEST SERPL-MCNC: 96 MG/DL
CHOLEST/HDLC SERPL: 2.2 (CALC)
CO2 SERPL-SCNC: 24 MMOL/L (ref 20–32)
CREAT SERPL-MCNC: 0.6 MG/DL (ref 0.6–1)
EGFR: 96 ML/MIN/1.73M2
EOSINOPHIL # BLD AUTO: 445 CELLS/UL (ref 15–500)
EOSINOPHIL NFR BLD AUTO: 7.3 %
ERYTHROCYTE [DISTWIDTH] IN BLOOD BY AUTOMATED COUNT: 13.1 % (ref 11–15)
GLOBULIN SER CALC-MCNC: 2.7 G/DL (CALC) (ref 1.9–3.7)
GLUCOSE SERPL-MCNC: 99 MG/DL (ref 65–99)
HBA1C MFR BLD: 5.4 % OF TOTAL HGB
HCT VFR BLD AUTO: 40.5 % (ref 35–45)
HDLC SERPL-MCNC: 44 MG/DL
HGB BLD-MCNC: 13.1 G/DL (ref 11.7–15.5)
LDLC SERPL CALC-MCNC: 36 MG/DL (CALC)
LYMPHOCYTES # BLD AUTO: 2562 CELLS/UL (ref 850–3900)
LYMPHOCYTES NFR BLD AUTO: 42 %
MCH RBC QN AUTO: 30.1 PG (ref 27–33)
MCHC RBC AUTO-ENTMCNC: 32.3 G/DL (ref 32–36)
MCV RBC AUTO: 93.1 FL (ref 80–100)
MONOCYTES # BLD AUTO: 573 CELLS/UL (ref 200–950)
MONOCYTES NFR BLD AUTO: 9.4 %
NEUTROPHILS # BLD AUTO: 2458 CELLS/UL (ref 1500–7800)
NEUTROPHILS NFR BLD AUTO: 40.3 %
NONHDLC SERPL-MCNC: 52 MG/DL (CALC)
PLATELET # BLD AUTO: 189 THOUSAND/UL (ref 140–400)
PMV BLD REES-ECKER: 11.3 FL (ref 7.5–12.5)
POTASSIUM SERPL-SCNC: 4.1 MMOL/L (ref 3.5–5.3)
PROT SERPL-MCNC: 6.9 G/DL (ref 6.1–8.1)
RBC # BLD AUTO: 4.35 MILLION/UL (ref 3.8–5.1)
SODIUM SERPL-SCNC: 142 MMOL/L (ref 135–146)
TRIGL SERPL-MCNC: 80 MG/DL
TSH SERPL-ACNC: 0.03 MIU/L (ref 0.4–4.5)
WBC # BLD AUTO: 6.1 THOUSAND/UL (ref 3.8–10.8)

## 2024-09-20 LAB
ALBUMIN/CREAT UR: 7 MG/G CREAT
CREAT UR-MCNC: 168 MG/DL (ref 20–275)
MICROALBUMIN UR-MCNC: 1.2 MG/DL

## 2024-09-21 ENCOUNTER — IMMUNIZATION (OUTPATIENT)
Dept: OBSTETRICS AND GYNECOLOGY | Facility: CLINIC | Age: 74
End: 2024-09-21
Payer: MEDICARE

## 2024-09-21 DIAGNOSIS — Z23 NEED FOR VACCINATION: Primary | ICD-10-CM

## 2024-09-21 PROCEDURE — 90653 IIV ADJUVANT VACCINE IM: CPT | Mod: S$GLB,,, | Performed by: FAMILY MEDICINE

## 2024-09-21 PROCEDURE — G0008 ADMIN INFLUENZA VIRUS VAC: HCPCS | Mod: S$GLB,,, | Performed by: FAMILY MEDICINE

## 2024-09-26 RX ORDER — ISOSORBIDE DINITRATE 5 MG/1
5 TABLET ORAL 3 TIMES DAILY
COMMUNITY
Start: 2024-07-02

## 2024-09-26 RX ORDER — SACUBITRIL AND VALSARTAN 24; 26 MG/1; MG/1
0.5 TABLET, FILM COATED ORAL 2 TIMES DAILY
COMMUNITY

## 2024-09-26 NOTE — PROGRESS NOTES
This note was created by combination of typed  and M-Modal dictation.  Transcription errors may be present.   This note was also generated with the assistance of ambient listening technology. Verbal consent was obtained by the patient and accompanying visitor(s) for the recording of patient appointment to facilitate this note. I attest to having reviewed and edited the generated note for accuracy, though some syntax or spelling errors may persist. Please contact the author of this note for any clarification.    Assessment and Plan:   Assessment and Plan    Type 2 diabetes mellitus with diabetic polyneuropathy, without long-term current use of insulin  Fatty liver  -significant weight loss with Mounjaro 5 mg.  A1c in normal range now.  Had previously plan to increase her to 7.5 but she would be interested in staying on the 5 mg at this time.  I think that is reasonable no changes.  Stay on metformin  Seeing a new ophthalmologist Dr. Bansal.  Had previously been seeing Dr. Muro for decades but unfortunately he passed away. Last seen 7/2024  -     Cancel: Hemoglobin A1C; Future; Expected date: 12/26/2024  -     metFORMIN (GLUCOPHAGE-XR) 500 MG ER 24hr tablet; 1 in AM 2 in PM  Dispense: 270 tablet; Refill: 3  -     tirzepatide (MOUNJARO) 5 mg/0.5 mL PnIj; Inject 5 mg into the skin every 7 days. Second month  Dispense: 12 Pen; Refill: 3  -     Hemoglobin A1C; Future; Expected date: 12/26/2024      Hypothyroidism due to Hashimoto's thyroiditis  -pre visit TSH low.  Could this be from weight loss?  Stay on current dose 100, repeat 3 months and if still low reduced down to levothyroxine 88  -     Cancel: TSH; Future; Expected date: 12/26/2024  -     TSH; Future; Expected date: 12/26/2024    Chronic heart failure with preserved ejection fraction  -followed by outside cardiology.  Takes Entresto half tablet b.i.d., Lasix.  She typically gets an EKG when she sees him every 6 months but now apparently his office  has a facility fee and it is expensive.  They suggested that she get her EKGs done with me going forward.    -     furosemide (LASIX) 20 MG tablet; Take 1 tablet (20 mg total) by mouth 3 (three) times a week.    Dyslipidemia  Abdominal aortic atherosclerosis incidental on CT; on statin  -on low-dose statin with excellent effects.  She was concerned about low cholesterol and risk for cancer, I am not what of definitive link.  Okay to stay on current dose.  Her cardiologist wanted to check a lipoprotein a.  I will check that with her upcoming labs and discussed that since she goes to [facility] for her labs her cardiologist can order his labs going forward  -     LIPOPROTEIN A (LPA); Future; Expected date: 2024    Nonallergic rhinitis  -gets seasonal nasal congestion, was told by her ophthalmologist to avoid nasal steroids with her glaucoma.  Would recommend Astelin.  Previously sent in, sent in again  -     azelastine (ASTELIN) 137 mcg (0.1 %) nasal spray; 1 spray (137 mcg total) by Nasal route 2 (two) times daily.  Dispense: 30 mL; Refill: 11    Hyperplastic polyp of sigmoid colon    Carcinoid tumor of lung, unspecified whether malignant  -upcoming follow-up with Heme-Onc          Medications Discontinued During This Encounter   Medication Reason    valsartan (DIOVAN) 40 MG tablet Alternate therapy    tirzepatide 7.5 mg/0.5 mL PnIj Dose adjustment    metFORMIN (GLUCOPHAGE-XR) 500 MG ER 24hr tablet     furosemide (LASIX) 20 MG tablet     tirzepatide (MOUNJARO) 5 mg/0.5 mL PnIj Reorder    azelastine (ASTELIN) 137 mcg (0.1 %) nasal spray Reorder       meds sent this encounter:  Medications Ordered This Encounter   Medications    azelastine (ASTELIN) 137 mcg (0.1 %) nasal spray     Si spray (137 mcg total) by Nasal route 2 (two) times daily.     Dispense:  30 mL     Refill:  11    furosemide (LASIX) 20 MG tablet     Sig: Take 1 tablet (20 mg total) by mouth 3 (three) times a week.    metFORMIN (GLUCOPHAGE-XR) 500  MG ER 24hr tablet     Si in AM 2 in PM     Dispense:  270 tablet     Refill:  3     Pharmacy update refills, keep on file, not requesting Rx to be filled today.    tirzepatide (MOUNJARO) 5 mg/0.5 mL PnIj     Sig: Inject 5 mg into the skin every 7 days. Second month     Dispense:  12 Pen     Refill:  3         Follow Up:  Follow-up 6 months with labs  Future Appointments   Date Time Provider Department Center   2024 10:40 AM Clement Pihllips MD [facility]         Subjective:   Subjective   Chief Complaint   Patient presents with    Follow-up    Nasal Congestion       HPI  Alvina is a 73 y.o. female.    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      Social History     Social History Narrative    Not on file       Patient Care Team:  Clement Phillips MD as PCP - General (Internal Medicine)  Yuriy Amor MD as Consulting Physician (Internal Medicine)  Radha Lu MD (Pulmonary Disease)  Edmund Cantrell MD as Consulting Physician (Cardiothoracic Surgery)  Wilbert Arredondo MD (Cardiology)  Chris Muro MD as Consulting Physician (Ophthalmology)  [facility] (Gastroenterology)  David Bansal MD (Ophthalmology)  Hayley Pink MD (Hematology and Oncology)  Bonnie Francis as Digital Medicine Health   Clement Phillips MD as Diabetes Digital Medicine Responsible Provider (Internal Medicine)  Sofie Carrera, PharmD as Diabetes Digital Medicine Clinician (Pharmacist)  Medicare, [employer] Managed as Diabetes Digital Medicine Contract  Eladio Tubbs MA as Care Coordinator  David Bansal MD (Ophthalmology)    Danelle ORTIZ  recorded. Patient has had a hysterectomy.    Last appointment with this clinic was Visit date not found. Last visit with me Visit date not found   To summarize last visit and events leading up to today:  Diabetes. Had previously taken Trulicity but stopped due to cost.  Rechallenge.  Stay on metformin.  HFpEF.  Followed by Cardiology.  Toprol-XL, Lasix, history of entresto but expensive.  Valsartan.   Dyslipidemia/statin  Carcinoid tumor of the lung followed by Hematology-Oncology  Chest wall pain after surgical resection.  Gabapentin.  Adjustment disorder p.r.n. diazepam  SAM/CPAP     She saw Heme-Onc in follow-up 4/10.  Status post PET-CT with decreasing PET avid lesion.     03/27/2023 CT urogram negative  04/24/2023 cystoscopy normal.  Referred to pelvic floor physical therapy     Saw pulmonology in follow-up 06/20/2023.    SAM/CPAP, not using regularly due to discomfort.  Encouraged to take regularly.  Maybe a factor in her fatigue.  Dyspnea improved on Entresto though the Entresto was expensive     Has follow-up with cardiology 9/19     04/10/2023 labs  Ferritin normal  Iron profile normal  CBC normal, mildly elevated eosinophil fraction   Vitamin-D normal  Pro BNP normal     TSH 2/28/23 WNL     Pre visit labs  CBC normal   CMP normal   A1c 5.7  Urine microalbumin normal     Last visit with me 09/12/2023   Right rotator tendinitis and epicondylitis.  Consider possibility of statin myalgias.    Diabetes, obesity, notes weight loss with Trulicity but has been off of it for a month due to cost.  Restarted.  Stay on metformin.  Fatigue.  Has SAM was not using CPAP every night.  Lab workup with Heme-Onc negative.  Pan CT scans were normal.  Carcinoid tumor lung, followed by Hematology-Oncology.     Saw cardiology 09/19/2023.  Stop Entresto and changed back to valsartan because of cost     Saw Heme-Onc 10/18/2023.  Stable.     Saw Saw Urology 10/20/2023.  UUI.  Pelvic floor physical therapy     Saw cardiology in  follow-up 10/26/2023.  Not feeling good, blood pressure low, decreased valsartan dose     Saw nurse practitioner 01/09/2024 for dysuria hematuria.  Suspicious for stone.  She declined CT imaging.  Given Flomax and oxybutynin  Ultimately pursued CT scan showing right ureteral stones     Saw Urology in follow-up 01/18/2024.  Attempt to spontaneously pass.     She saw outside urology 1/19.  Plan was right ureteroscopy and stone extraction     02/02/2024 saw cardiology in follow-up.  Stable.     02/05/2024 saw pulmonology in follow-up.  SAM not using CPAP acutely.  Blood pressure was high at that visit.  Restart the CPAP.  Dyspnea, stable.     Saw Urology in follow-up 02/27/2024.  Spontaneously passed the kidney stone.     Pre visit labs  CBC normal   CMP normal   Lipid profile good   A1c 6.1 from 5.7   TSH normal    Last visit me 03/15/2024  Diabetes trial of Mounjaro.  Concerns about donut hole  HFpEF followed by outside cardiology  Hypothyroid good on current dose levothyroxine  Chronic back pain.  Last PET-CT no evidence of neoplastic findings in the area  Carcinoid tumor of the lung followed by Heme-Onc    05/16/2024 DEXA L-spine 1.1, femoral neck -2.0, total hip -1.3.  FRAX score 2.2/11%.  Compared to previous DEXA BMD stable.  Wide variation in BMD between L2 and L3.  Osteopenia.  XR L spine ordered    XR  Considerable scoliosis, convex toward the left at the thoracolumbar junction and toward the right at the lower lumbar level. Vertebral body heights appear to be adequately maintained. There appears to be severe hypertrophic spurring and disc space narrowing at the T12-L1 and L1-2 levels. Probable mild disc space narrowing at the L2-3 and L5-S1 levels.Multilevel facet arthropathy. No definite evidence of acute fracture or osseous destruction.  Results to patient via [facility]. Osteopenia not osteoporosis. Repeat 2 years     Saw cardiology 07/02/2024.  Continued dyspnea on exertion.  Stop valsartan start low-dose  Entresto start half tablet.  Every-other-day Lasix.    Saw pulmonology in follow-up 08/09/2024.  Not using CPAP.  Continue to monitor off of it.  Purposeful weight loss.    On follow up with Cardiology 08/13/2024 feeling better on Entresto  He wanted lipoprotein a level with labs with me    Pre visit labs   CBC normal   CMP normal   Lipid profile good  A1c normal 5.4 from 6.1  TSH low from previous 0.613  Urine microalbumin normal    Cardiology can order there labs [facility]    Filling   Isosorbide  Furosemide   Levothyroxine 100   Metformin  Rosuvastatin   Entresto  Mounjaro 5  Valsartan    Today's visit:    HPI:  Alvina reports improved well-being after restarting Entresto at a reduced dose of half a tablet twice daily due to low blood pressure issues.     She has lost approximately 25 lbs, decreasing from 195 lbs to 170 lbs, and notes increased energy levels, improved overall health, and enhanced ability to perform daily activities. The patient is able to engage in morning chores such as laundry, indicating improved functionality. Her A1C has improved from 6.1 to 5.4. She is taking Mounjaro (5mg dose) for diabetes management, which is better tolerated than previous medication.     The patient has rhinorrhea, which is clear and non-disruptive to sleep, attributed to allergies and high pollen count, particularly when engaging in outdoor activities.    The patient denies fever, chills, and vomiting.    MEDICATIONS:  The patient is on Entresto, taking 1 pill total per day (half in the morning, half at night) for her heart condition. She is also on Isosorbide as needed for chest pain. Furosemide (Lasix) is taken 3 times per week (Monday, Wednesday, Friday) for fluid retention. The patient is on Rosuvastatin for cholesterol and Levothyroxine for thyroid. For blood sugar control, she takes Metformin 3 pills per day (1 in the morning, 2 at night) and Mounjaro 5 mg for diabetes (A1C control).    ROS:  General: no fever, no  "chills, reports weight loss, reports change in weight  ENT: reports runny nose  Gastrointestinal: no vomiting             ALLERGIES AND MEDICATIONS: updated and reviewed.  Medication List with Changes/Refills   Current Medications    ACETAMINOPHEN (TYLENOL) 500 MG TABLET    Take 500 mg by mouth every 6 (six) hours as needed for Pain.    ALBUTEROL (VENTOLIN HFA) 90 MCG/ACTUATION INHALER    Inhale 2 puffs into the lungs every 6 (six) hours as needed for Wheezing. Rescue    ASPIRIN (ECOTRIN) 81 MG EC TABLET        AZELASTINE (ASTELIN) 137 MCG (0.1 %) NASAL SPRAY    1 spray (137 mcg total) by Nasal route 2 (two) times daily.    BRIMONIDINE 0.2% (ALPHAGAN) 0.2 % DROP        DIAZEPAM (VALIUM) 5 MG TABLET    Take 1 tablet (5 mg total) by mouth nightly as needed for Anxiety.    DORZOLAMIDE-TIMOLOL 2-0.5% (COSOPT) 22.3-6.8 MG/ML OPHTHALMIC SOLUTION        FUROSEMIDE (LASIX) 20 MG TABLET    TAKE 1 TABLET ONE TIME DAILY    LEVOTHYROXINE (SYNTHROID) 100 MCG TABLET    Take 1 tablet (100 mcg total) by mouth before breakfast.    METFORMIN (GLUCOPHAGE-XR) 500 MG ER 24HR TABLET    Take 2 tablets (1,000 mg total) by mouth 2 (two) times daily with meals.    ROSUVASTATIN (CRESTOR) 10 MG TABLET    Take 1 tablet (10 mg total) by mouth nightly.    TIRZEPATIDE (MOUNJARO) 5 MG/0.5 ML PNIJ    Inject 5 mg into the skin every 7 days. Second month    TIRZEPATIDE 7.5 MG/0.5 ML PNIJ    Inject 7.5 mg into the skin every 7 days. 3rd month onward    VALSARTAN (DIOVAN) 40 MG TABLET    Take 1 tablet by mouth 2 (two) times daily.    VITAMIN D (VITAMIN D3) 1000 UNITS TAB             Objective:   Objective   Physical Exam   Vitals:    09/27/24 1019   BP: 120/62   Pulse: 96   Temp: 98 °F (36.7 °C)   TempSrc: Oral   SpO2: 96%   Weight: 77.6 kg (170 lb 15.5 oz)   Height: 5' 5" (1.651 m)    Body mass index is 28.45 kg/m².            Physical Exam  Constitutional:       Appearance: She is well-developed.   HENT:      Right Ear: Tympanic membrane, ear canal " and external ear normal.      Left Ear: Tympanic membrane, ear canal and external ear normal.   Eyes:      General: No scleral icterus.     Extraocular Movements: Extraocular movements intact.      Pupils: Pupils are equal, round, and reactive to light.   Cardiovascular:      Rate and Rhythm: Normal rate and regular rhythm.      Pulses:           Dorsalis pedis pulses are 3+ on the right side and 3+ on the left side.        Posterior tibial pulses are 3+ on the right side and 3+ on the left side.      Heart sounds: Normal heart sounds. No murmur heard.  Pulmonary:      Effort: Pulmonary effort is normal.      Breath sounds: Normal breath sounds. No wheezing.   Abdominal:      Palpations: Abdomen is soft. There is no hepatomegaly, splenomegaly or mass.      Tenderness: There is no abdominal tenderness.   Musculoskeletal:         General: No deformity. Normal range of motion.      Cervical back: Neck supple.      Right foot: No deformity.      Left foot: No deformity.   Feet:      Right foot:      Protective Sensation: 5 sites tested.  5 sites sensed.      Skin integrity: No ulcer, blister, skin breakdown, erythema or warmth.      Left foot:      Protective Sensation: 5 sites tested.  5 sites sensed.      Skin integrity: No ulcer, blister, skin breakdown, erythema or warmth.   Lymphadenopathy:      Cervical: No cervical adenopathy.   Skin:     General: Skin is warm and dry.      Findings: No rash.      Comments: On exposed skin   Neurological:      Mental Status: She is alert and oriented to person, place, and time.      Deep Tendon Reflexes: Reflexes are normal and symmetric.   Psychiatric:         Behavior: Behavior normal.         Thought Content: Thought content normal.         Judgment: Judgment normal.       Component      Latest Ref Rn 3/8/2024 9/17/2024 9/19/2024   WBC      3.8 - 10.8 Thousand/uL 7.91  6.1     RBC      3.80 - 5.10 Million/uL 4.42  4.35     Hemoglobin      11.7 - 15.5 g/dL 13.1  13.1      Hematocrit      35.0 - 45.0 % 40.6  40.5     MCV      80.0 - 100.0 fL 92  93.1     MCH      27.0 - 33.0 pg 29.6  30.1     MCHC      32.0 - 36.0 g/dL 32.3  32.3     RDW      11.0 - 15.0 % 13.2  13.1     Platelet Count      140 - 400 Thousand/uL 201  189     MPV      7.5 - 12.5 fL 11.5  11.3     Neutrophils, Abs      1,500 - 7,800 cells/uL  2,458     Lymph #      850 - 3,900 cells/uL  2,562     Mono #      200 - 950 cells/uL  573     Eos #      15 - 500 cells/uL  445     Baso #      0 - 200 cells/uL  61     Neutrophils Relative      %  40.3     Lymph %      %  42.0     Mono %      %  9.4     Eos %      %  7.3     Basophil %      %  1.0     Glucose      65 - 99 mg/dL 123 (H)  99     BUN      7 - 25 mg/dL 15  17     Creatinine      0.60 - 1.00 mg/dL 0.8  0.60     eGFR      > OR = 60 mL/min/1.73m2  96     BUN/CREAT RATIO      6 - 22 (calc)  SEE NOTE:     Sodium      135 - 146 mmol/L 142  142     Potassium      3.5 - 5.3 mmol/L 4.5  4.1     Chloride      98 - 110 mmol/L 107  105     CO2      20 - 32 mmol/L 24  24     Calcium      8.6 - 10.4 mg/dL 9.9  9.3     PROTEIN TOTAL      6.1 - 8.1 g/dL 6.9  6.9     Albumin      3.6 - 5.1 g/dL 3.8  4.2     Globulin, Total      1.9 - 3.7 g/dL (calc)  2.7     Albumin/Globulin Ratio      1.0 - 2.5 (calc)  1.6     BILIRUBIN TOTAL      0.2 - 1.2 mg/dL 0.4  0.7     ALP      37 - 153 U/L 82  48     AST      10 - 35 U/L 21  19     ALT      6 - 29 U/L 25  21     eGFR      >60 mL/min/1.73 m^2 >60.0      Anion Gap      8 - 16 mmol/L 11      Cholesterol Total      <200 mg/dL 89 (L)  96     Triglycerides      <150 mg/dL 85  80     HDL      > OR = 50 mg/dL 36 (L)  44 (L)     LDL Cholesterol      mg/dL (calc) 36.0 (L)  36     HDL/Cholesterol Ratio      <5.0 (calc) 40.4  2.2     Total Cholesterol/HDL Ratio      2.0 - 5.0  2.5      Non-HDL Cholesterol      mg/dL 53      Non HDL Chol. (LDL+VLDL)      <130 mg/dL (calc)  52     Urine Creatinine      20 - 275 mg/dL   168    Microalb, Ur      See Note:  mg/dL   1.2    MICROALB/CREAT RATIO      <30 mg/g creat   7    Hemoglobin A1C External      <5.7 % of total Hgb 6.1 (H)  5.4     Estimated Avg Glucose      68 - 131 mg/dL 128      TSH      0.40 - 4.50 mIU/L 0.613  0.03 (L)        Legend:  (H) High  (L) Low

## 2024-09-27 ENCOUNTER — OFFICE VISIT (OUTPATIENT)
Dept: FAMILY MEDICINE | Facility: CLINIC | Age: 74
End: 2024-09-27
Payer: MEDICARE

## 2024-09-27 VITALS
HEIGHT: 65 IN | OXYGEN SATURATION: 96 % | DIASTOLIC BLOOD PRESSURE: 62 MMHG | WEIGHT: 170.94 LBS | SYSTOLIC BLOOD PRESSURE: 120 MMHG | BODY MASS INDEX: 28.48 KG/M2 | HEART RATE: 96 BPM | TEMPERATURE: 98 F

## 2024-09-27 DIAGNOSIS — E78.5 DYSLIPIDEMIA: ICD-10-CM

## 2024-09-27 DIAGNOSIS — E03.8 HYPOTHYROIDISM DUE TO HASHIMOTO'S THYROIDITIS: ICD-10-CM

## 2024-09-27 DIAGNOSIS — E11.42 TYPE 2 DIABETES MELLITUS WITH DIABETIC POLYNEUROPATHY, WITHOUT LONG-TERM CURRENT USE OF INSULIN: Primary | ICD-10-CM

## 2024-09-27 DIAGNOSIS — K63.5 HYPERPLASTIC POLYP OF SIGMOID COLON: ICD-10-CM

## 2024-09-27 DIAGNOSIS — E06.3 HYPOTHYROIDISM DUE TO HASHIMOTO'S THYROIDITIS: ICD-10-CM

## 2024-09-27 DIAGNOSIS — D3A.090 CARCINOID TUMOR OF LUNG, UNSPECIFIED WHETHER MALIGNANT: ICD-10-CM

## 2024-09-27 DIAGNOSIS — J31.0 NONALLERGIC RHINITIS: ICD-10-CM

## 2024-09-27 DIAGNOSIS — I70.0 ABDOMINAL AORTIC ATHEROSCLEROSIS: ICD-10-CM

## 2024-09-27 DIAGNOSIS — I50.32 CHRONIC HEART FAILURE WITH PRESERVED EJECTION FRACTION: ICD-10-CM

## 2024-09-27 DIAGNOSIS — K76.0 FATTY LIVER: ICD-10-CM

## 2024-09-27 PROCEDURE — 99999 PR PBB SHADOW E&M-EST. PATIENT-LVL V: CPT | Mod: PBBFAC,,, | Performed by: INTERNAL MEDICINE

## 2024-09-27 RX ORDER — AZELASTINE 1 MG/ML
1 SPRAY, METERED NASAL 2 TIMES DAILY
Qty: 30 ML | Refills: 11 | Status: SHIPPED | OUTPATIENT
Start: 2024-09-27 | End: 2025-09-27

## 2024-09-27 RX ORDER — FUROSEMIDE 20 MG/1
20 TABLET ORAL
Start: 2024-09-27

## 2024-09-27 RX ORDER — METFORMIN HYDROCHLORIDE 500 MG/1
TABLET, EXTENDED RELEASE ORAL
Qty: 270 TABLET | Refills: 3 | Status: SHIPPED | OUTPATIENT
Start: 2024-09-27

## 2024-09-27 RX ORDER — TIRZEPATIDE 5 MG/.5ML
5 INJECTION, SOLUTION SUBCUTANEOUS
Qty: 12 PEN | Refills: 3 | Status: SHIPPED | OUTPATIENT
Start: 2024-09-27

## 2024-09-27 NOTE — PATIENT INSTRUCTIONS
I am sending in lab orders to [facility] to be done around December    TSH  A1c  Lipoprotein A    In 6 months they should have  TSH  CBC  CMP  A1c

## 2024-10-22 NOTE — TELEPHONE ENCOUNTER
Pt experiencing numbness/tingling of fingertips of both hands. Started about a month ago. Starts out as tingling and gets worse. Pt bought a brace to wear at night, which has helped. In no pain right now, just tingling.     Dispo- see within 3 days in office. Same day virtual visit scheduled for this evening. Care advice given.  Reason for Disposition   Numbness or tingling in one or both hands is a chronic symptom (recurrent or ongoing problem lasting > 4 weeks)    Additional Information   Negative: Difficult to awaken or acting confused (e.g., disoriented, slurred speech)   Negative: New neurologic deficit that is present NOW, sudden onset of ANY of the following: * Weakness of the face, arm, or leg on one side of the body* Numbness of the face, arm, or leg on one side of the body* Loss of speech or garbled speech   Negative: Sounds like a life-threatening emergency to the triager   Negative: SEVERE weakness (i.e., unable to walk or barely able to walk, requires support) and new-onset or worsening   Negative: Headache (with neurologic deficit)   Negative: Unable to urinate (or only a few drops) and bladder feels very full   Negative: Loss of bladder or bowel control (urine or bowel incontinence; wetting self, leaking stool) of new-onset   Negative: Back pain with numbness (loss of sensation) in groin or rectal area   Negative: Neurologic deficit that was brief (now gone), ANY of the following: * Weakness of the face, arm, or leg on one side of the body * Numbness of the face, arm, or leg on one side of the body * Loss of speech or garbled speech   Negative: Patient sounds very sick or weak to the triager   Negative: Neurologic deficit of gradual onset (e.g., days to weeks), ANY of the following: * Weakness of the face, arm, or leg on one side of the body* Numbness of the face, arm, or leg on one side of the body* Loss of speech or garbled speech   Negative: Henriette palsy suspected (i.e., weakness only one side of  the face, developing over hours to days, no other symptoms)   Negative: Tingling (e.g., pins and needles) of the face, arm or leg on one side of the body, that is present now (Exceptions: Chronic or recurrent symptom lasting > 4 weeks; or tingling from known cause, such as: bumped elbow, carpal tunnel syndrome, pinched nerve, frostbite.)   Negative: Neck pain (with neurologic deficit)   Negative: Back pain (with neurologic deficit)   Negative: Patient wants to be seen   Negative: Loss of speech or garbled speech is a chronic symptom (recurrent or ongoing problem lasting > 4 weeks)   Negative: Weakness of arm or leg is a chronic symptom (recurrent or ongoing problem lasting > 4 weeks)    Protocols used: Neurologic Deficit-A-OH     hair removal not indicated

## 2024-11-19 ENCOUNTER — PATIENT MESSAGE (OUTPATIENT)
Dept: FAMILY MEDICINE | Facility: CLINIC | Age: 74
End: 2024-11-19
Payer: MEDICARE

## 2024-12-16 ENCOUNTER — PATIENT MESSAGE (OUTPATIENT)
Dept: FAMILY MEDICINE | Facility: CLINIC | Age: 74
End: 2024-12-16
Payer: MEDICARE

## 2024-12-16 DIAGNOSIS — E06.3 HYPOTHYROIDISM DUE TO HASHIMOTO'S THYROIDITIS: Primary | ICD-10-CM

## 2024-12-16 DIAGNOSIS — E06.3 HYPOTHYROIDISM DUE TO HASHIMOTO'S THYROIDITIS: ICD-10-CM

## 2024-12-16 RX ORDER — LEVOTHYROXINE SODIUM 88 UG/1
88 TABLET ORAL
Qty: 60 TABLET | Refills: 0 | Status: SHIPPED | OUTPATIENT
Start: 2024-12-16

## 2024-12-16 NOTE — TELEPHONE ENCOUNTER
No care due was identified.  [facility] Embedded Care Due Messages. Reference number: [id number].   12/16/2024 10:38:02 AM CST

## 2024-12-17 RX ORDER — LEVOTHYROXINE SODIUM 88 UG/1
TABLET ORAL
Qty: 90 TABLET | OUTPATIENT
Start: 2024-12-17

## 2024-12-17 NOTE — TELEPHONE ENCOUNTER
Refill Decision Note   Alvina Fisher  is requesting a refill authorization.  Brief Assessment and Rationale for Refill:  Quick Discontinue     Medication Therapy Plan:  E-Prescribing Status: Receipt confirmed by pharmacy (12/16/2024)      Comments:     Note composed:7:35 AM 12/17/2024

## 2024-12-23 ENCOUNTER — PATIENT MESSAGE (OUTPATIENT)
Dept: FAMILY MEDICINE | Facility: CLINIC | Age: 74
End: 2024-12-23
Payer: MEDICARE

## 2025-02-07 ENCOUNTER — PATIENT MESSAGE (OUTPATIENT)
Dept: FAMILY MEDICINE | Facility: CLINIC | Age: 75
End: 2025-02-07
Payer: MEDICARE

## 2025-02-07 DIAGNOSIS — E11.42 TYPE 2 DIABETES MELLITUS WITH DIABETIC POLYNEUROPATHY, WITHOUT LONG-TERM CURRENT USE OF INSULIN: ICD-10-CM

## 2025-02-07 DIAGNOSIS — E06.3 HYPOTHYROIDISM DUE TO HASHIMOTO'S THYROIDITIS: ICD-10-CM

## 2025-02-07 RX ORDER — METFORMIN HYDROCHLORIDE 500 MG/1
TABLET, EXTENDED RELEASE ORAL
Qty: 270 TABLET | Refills: 3 | Status: SHIPPED | OUTPATIENT
Start: 2025-02-07

## 2025-02-07 RX ORDER — LEVOTHYROXINE SODIUM 88 UG/1
88 TABLET ORAL
Qty: 30 TABLET | Refills: 0 | Status: SHIPPED | OUTPATIENT
Start: 2025-02-07 | End: 2025-02-11 | Stop reason: SDUPTHER

## 2025-02-07 NOTE — TELEPHONE ENCOUNTER
No care due was identified.  [facility] Embedded Care Due Messages. Reference number: [id number].   2/07/2025 12:59:23 PM CST

## 2025-02-10 ENCOUNTER — LAB VISIT (OUTPATIENT)
Dept: LAB | Facility: HOSPITAL | Age: 75
End: 2025-02-10
Attending: INTERNAL MEDICINE
Payer: MEDICARE

## 2025-02-10 DIAGNOSIS — E06.3 HYPOTHYROIDISM DUE TO HASHIMOTO'S THYROIDITIS: ICD-10-CM

## 2025-02-10 LAB — TSH SERPL DL<=0.005 MIU/L-ACNC: 0.57 UIU/ML (ref 0.4–4)

## 2025-02-10 PROCEDURE — 84443 ASSAY THYROID STIM HORMONE: CPT | Performed by: INTERNAL MEDICINE

## 2025-02-10 PROCEDURE — 36415 COLL VENOUS BLD VENIPUNCTURE: CPT | Mod: PO | Performed by: INTERNAL MEDICINE

## 2025-02-11 DIAGNOSIS — E06.3 HYPOTHYROIDISM DUE TO HASHIMOTO'S THYROIDITIS: ICD-10-CM

## 2025-02-11 DIAGNOSIS — E11.42 TYPE 2 DIABETES MELLITUS WITH DIABETIC POLYNEUROPATHY, WITHOUT LONG-TERM CURRENT USE OF INSULIN: ICD-10-CM

## 2025-02-11 DIAGNOSIS — E78.5 DYSLIPIDEMIA: Primary | ICD-10-CM

## 2025-02-11 RX ORDER — LEVOTHYROXINE SODIUM 88 UG/1
88 TABLET ORAL
Qty: 90 TABLET | Refills: 1 | Status: SHIPPED | OUTPATIENT
Start: 2025-02-11

## 2025-02-12 ENCOUNTER — PATIENT MESSAGE (OUTPATIENT)
Dept: FAMILY MEDICINE | Facility: CLINIC | Age: 75
End: 2025-02-12
Payer: MEDICARE

## 2025-02-20 ENCOUNTER — PATIENT MESSAGE (OUTPATIENT)
Dept: ADMINISTRATIVE | Facility: OTHER | Age: 75
End: 2025-02-20
Payer: MEDICARE

## 2025-02-22 DIAGNOSIS — Z00.00 ENCOUNTER FOR MEDICARE ANNUAL WELLNESS EXAM: ICD-10-CM

## 2025-03-15 NOTE — H&P
H&P  has been reviewed.  The patient has been examined, I concur with the findings and no changes have occurred since H&P was written.   Cough

## 2025-03-18 ENCOUNTER — TELEPHONE (OUTPATIENT)
Dept: FAMILY MEDICINE | Facility: CLINIC | Age: 75
End: 2025-03-18
Payer: MEDICARE

## 2025-03-18 DIAGNOSIS — E11.42 TYPE 2 DIABETES MELLITUS WITH DIABETIC POLYNEUROPATHY, WITHOUT LONG-TERM CURRENT USE OF INSULIN: ICD-10-CM

## 2025-03-18 DIAGNOSIS — E06.3 HYPOTHYROIDISM DUE TO HASHIMOTO'S THYROIDITIS: Primary | ICD-10-CM

## 2025-03-18 NOTE — TELEPHONE ENCOUNTER
----- Message from Med Assistant Fabrizio sent at 3/18/2025  9:17 AM CDT -----  Type: Patient Call BackWho called: SelfWhat is the request in detail: pt. States her lab orders weren't sent to [facility] and she's asking for them to put in for [facility] to have them done today please.. Can the clinic reply by [facility]?NOWould the patient rather a call back or a response via [facility]? Yes, call Best call back number: [phone] (home) or [phone]

## 2025-03-19 ENCOUNTER — LAB VISIT (OUTPATIENT)
Dept: LAB | Facility: HOSPITAL | Age: 75
End: 2025-03-19
Attending: INTERNAL MEDICINE
Payer: MEDICARE

## 2025-03-19 DIAGNOSIS — E06.3 HYPOTHYROIDISM DUE TO HASHIMOTO'S THYROIDITIS: ICD-10-CM

## 2025-03-19 DIAGNOSIS — E11.42 TYPE 2 DIABETES MELLITUS WITH DIABETIC POLYNEUROPATHY, WITHOUT LONG-TERM CURRENT USE OF INSULIN: ICD-10-CM

## 2025-03-19 LAB
ALBUMIN SERPL BCP-MCNC: 4 G/DL (ref 3.5–5.2)
ALP SERPL-CCNC: 75 U/L (ref 40–150)
ALT SERPL W/O P-5'-P-CCNC: 19 U/L (ref 10–44)
ANION GAP SERPL CALC-SCNC: 11 MMOL/L (ref 8–16)
AST SERPL-CCNC: 23 U/L (ref 10–40)
BILIRUB SERPL-MCNC: 0.4 MG/DL (ref 0.1–1)
BUN SERPL-MCNC: 17 MG/DL (ref 8–23)
CALCIUM SERPL-MCNC: 9.2 MG/DL (ref 8.7–10.5)
CHLORIDE SERPL-SCNC: 105 MMOL/L (ref 95–110)
CHOLEST SERPL-MCNC: 120 MG/DL (ref 120–199)
CHOLEST/HDLC SERPL: 2.9 {RATIO} (ref 2–5)
CO2 SERPL-SCNC: 23 MMOL/L (ref 23–29)
CREAT SERPL-MCNC: 0.7 MG/DL (ref 0.5–1.4)
ERYTHROCYTE [DISTWIDTH] IN BLOOD BY AUTOMATED COUNT: 13.9 % (ref 11.5–14.5)
EST. GFR  (NO RACE VARIABLE): >60 ML/MIN/1.73 M^2
ESTIMATED AVG GLUCOSE: 105 MG/DL (ref 68–131)
GLUCOSE SERPL-MCNC: 83 MG/DL (ref 70–110)
HBA1C MFR BLD: 5.3 % (ref 4–5.6)
HCT VFR BLD AUTO: 41.5 % (ref 37–48.5)
HDLC SERPL-MCNC: 42 MG/DL (ref 40–75)
HDLC SERPL: 35 % (ref 20–50)
HGB BLD-MCNC: 13.2 G/DL (ref 12–16)
LDLC SERPL CALC-MCNC: 58 MG/DL (ref 63–159)
MCH RBC QN AUTO: 29.7 PG (ref 27–31)
MCHC RBC AUTO-ENTMCNC: 31.8 G/DL (ref 32–36)
MCV RBC AUTO: 93 FL (ref 82–98)
NONHDLC SERPL-MCNC: 78 MG/DL
PLATELET # BLD AUTO: 201 K/UL (ref 150–450)
PMV BLD AUTO: 11.5 FL (ref 9.2–12.9)
POTASSIUM SERPL-SCNC: 3.9 MMOL/L (ref 3.5–5.1)
PROT SERPL-MCNC: 7.3 G/DL (ref 6–8.4)
RBC # BLD AUTO: 4.45 M/UL (ref 4–5.4)
SODIUM SERPL-SCNC: 139 MMOL/L (ref 136–145)
T4 FREE SERPL-MCNC: 1.16 NG/DL (ref 0.71–1.51)
TRIGL SERPL-MCNC: 100 MG/DL (ref 30–150)
TSH SERPL DL<=0.005 MIU/L-ACNC: 0.35 UIU/ML (ref 0.4–4)
WBC # BLD AUTO: 6.52 K/UL (ref 3.9–12.7)

## 2025-03-19 PROCEDURE — 84443 ASSAY THYROID STIM HORMONE: CPT | Performed by: INTERNAL MEDICINE

## 2025-03-19 PROCEDURE — 85027 COMPLETE CBC AUTOMATED: CPT | Performed by: INTERNAL MEDICINE

## 2025-03-19 PROCEDURE — 83036 HEMOGLOBIN GLYCOSYLATED A1C: CPT | Performed by: INTERNAL MEDICINE

## 2025-03-19 PROCEDURE — 80061 LIPID PANEL: CPT | Performed by: INTERNAL MEDICINE

## 2025-03-19 PROCEDURE — 84439 ASSAY OF FREE THYROXINE: CPT | Performed by: INTERNAL MEDICINE

## 2025-03-19 PROCEDURE — 80053 COMPREHEN METABOLIC PANEL: CPT | Performed by: INTERNAL MEDICINE

## 2025-03-19 PROCEDURE — 36415 COLL VENOUS BLD VENIPUNCTURE: CPT | Mod: PO | Performed by: INTERNAL MEDICINE

## 2025-03-26 NOTE — PROGRESS NOTES
This note was created by combination of typed  and M-Modal dictation.  Transcription errors may be present.   This note was also generated with the assistance of ambient listening technology. Verbal consent was obtained by the patient and accompanying visitor(s) for the recording of patient appointment to facilitate this note. I attest to having reviewed and edited the generated note for accuracy, though some syntax or spelling errors may persist. Please contact the author of this note for any clarification.    Assessment and Plan:   Assessment and Plan    Assessment & Plan  Normal physical exam  Hyperplastic polyp of sigmoid colon  03/27/2025: Pre visit labs reviewed   Colonoscopy 2017 hyperplastic polyp due 2027   Followed by Gynecology  Reports she had EGD with dilation of stricture done in December by Gastroenterology.  Will try to get the report.  CT scan done 01/24/2025 showing nonspecific thickening of the distal esophagus and posterior gastric fundus similar to prior exam.       Type 2 diabetes mellitus with diabetic polyneuropathy, without long-term current use of insulin  03/27/2025:  Pre visit labs A1c excellent.  On metformin, Mounjaro.  She was spacing out her Mounjaro to every 10 days because of cost, with new insurance it is more affordable so she has been back to once weekly dosing.  But she notes that her appetite has not been as suppress as it was previously.  She would like to lose another 10 lb for health purposes-improved joint pain with weight loss.  But she is undecided about wanting to increase the dose.    For now no change on Mounjaro stay on 5 mg.  If she wants to increase it to 7.5 she can notify me.  Check future labs  Orders:    Comprehensive Metabolic Panel; Future    Lipid Panel; Future    Hemoglobin A1C; Future    CBC Without Differential; Future    Chronic heart failure with preserved ejection fraction  03/27/2025:  Managed by outside cardiology.  On Lasix, Entresto.  Does  find the Entresto quite helpful.       Dyslipidemia  Abdominal aortic atherosclerosis incidental on CT; on statin  03/27/2025: Pre visit labs good on rosuvastatin no change check annual lipid       Hypothyroidism due to Hashimoto's thyroiditis  03/27/2025: Pre visit labs TSH mildly low free T4 normal.  On levothyroxine 88 since December.  Check future labs.  Dosing requirements may have changed with weight loss.  Orders:    TSH; Future    Carcinoid tumor of lung, unspecified whether malignant  03/27/2025: Followed by outside Hematology-Oncology.  Stable scans       Bilateral carpal tunnel syndrome  03/27/2025:  Symptoms suspicious for carpal tunnel.  Do not think this is diabetic neuropathy.  Does seem to improve with nighttime wrist splint use.  Does find it bothersome.  Referral to Orthopedics  Orders:    Ambulatory referral/consult to Orthopedics; Future    Tendonitis  03/27/2025:  Exam most consistent with muscle tendinitis.  Not really consistent with epicondylitis.  Do not think this is radiculopathy or shoulder.  Cold packs, self-directed physical therapy       Viral URI  03/27/2025: Benign exam.  It has been going on for about 4 days now.  Upcoming plane travel, she can use Afrin prior to boarding the plane.           Medications Discontinued During This Encounter   Medication Reason    isosorbide dinitrate (ISORDIL) 5 MG Tab     rosuvastatin (CRESTOR) 10 MG tablet Reorder    tirzepatide (MOUNJARO) 5 mg/0.5 mL PnIj Reorder    levothyroxine (SYNTHROID) 88 MCG tablet Reorder    tirzepatide (MOUNJARO) 5 mg/0.5 mL PnIj Reorder       meds sent this encounter:  Medications Ordered This Encounter   Medications    levothyroxine (SYNTHROID) 88 MCG tablet     Sig: Take 1 tablet (88 mcg total) by mouth before breakfast.     Dispense:  90 tablet     Refill:  1     Pharmacy update refills, keep on file, not requesting Rx to be filled today.    rosuvastatin (CRESTOR) 10 MG tablet     Sig: Take 1 tablet (10 mg total) by  mouth nightly.     Dispense:  90 tablet     Refill:  3     Pharmacy update refills, keep on file, not requesting Rx to be filled today.    tirzepatide (MOUNJARO) 5 mg/0.5 mL PnIj     Sig: Inject 5 mg into the skin every 7 days. Second month     Dispense:  12 Pen     Refill:  3     Pharmacy update refills, keep on file, not requesting Rx to be filled today.         Follow Up:  Follow-up 6 months with lab  Future Appointments   Date Time Provider Department Center   2025  7:00 AM SPECIMEN, JESI [facility] [place]   2025  8:15 AM LAB, AYDEE [facility] [place]   10/2/2025  1:40 PM Clement Phillips MD [facility]           Subjective:   Subjective   Chief Complaint   Patient presents with    Annual Exam    Shoulder Pain     Left        HPI  Alvina is a 74 y.o. female.    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   Tobacco Use    Smoking status: Former     Current packs/day: 0.00     Types: Cigarettes     Quit date: 1990     Years since quittin.9    Smokeless tobacco: Never   Substance and Sexual Activity    Alcohol use: Yes     Comment: occ    Drug use: No    Sexual activity: Yes     Social Drivers of Health     Financial Resource Strain: Low Risk  (2024)    Received from [facility]    Overall Financial Resource Strain (CARDIA)     Difficulty of Paying Living Expenses: Not hard at all   Food Insecurity: No Food Insecurity (2024)    Received from [facility]    Hunger Vital Sign     Worried About Running Out of Food in the Last Year: Never true     Ran Out of Food in the Last Year: Never true   Transportation Needs: No Transportation Needs (2024)    Received from [facility]    PRAPARE - Transportation     Lack of Transportation (Medical): No     Lack of Transportation (Non-Medical): No   Physical Activity: Insufficiently Active (2024)    Received from [facility]     Exercise Vital Sign     Days of Exercise per Week: 3 days     Minutes of Exercise per Session: 20 min   Stress: No Stress Concern Present (8/6/2024)    Received from [facility] [place] [facility] Occup[facility] - Occupational Stress Questionnaire     Feeling of Stress : Only a little   Housing Stability: Low Risk  (1/9/2024)    Housing Stability Vital Sign     Unable to Pay for Housing in the Last Year: No     Number of Places Lived in the Last Year: 1     Unstable Housing in the Last Year: No       No LMP recorded. Patient has had a hysterectomy.    Last appointment with this clinic was Visit date not found. Last visit with me 9/27/2024   To summarize last visit and events leading up to today:  Chronic Conditions and Management:  Diabetes Mellitus: Initially managed with Metformin and Trulicity, but Trulicity was discontinued due to cost. Rechallenged with Mounjaro, resulting in significant weight loss and normalized A1c levels. Current management includes Mounjaro 5 mg and Metformin.  Heart Failure with Preserved Ejection Fraction (HFpEF): Managed with Entresto (half tablet b.i.d.) and Lasix, following adjustments due to cost and blood pressure issues. Regular follow-ups with cardiology.  Dyslipidemia: Managed with a low-dose statin. Cardiology plans to monitor lipoprotein a levels.  Carcinoid Tumor of the Lung: Followed by Hematology-Oncology. Status post-resection (07/2020) with stable follow-up and regular surveillance.  Obstructive Sleep Apnea (SAM): Initially on CPAP, but patient is intolerant. Significant weight loss has been achieved, and no repeat testing is currently planned.  Hypothyroidism: Managed with Levothyroxine 100 mcg. TSH levels have been monitored and are currently normal.  Osteopenia: Diagnosed with stable bone mineral density (BMD) levels. Follow-up includes XR showing scoliosis and disc space narrowing.  Recent Visits and Key Findings:  Hematology-Oncology (10/18/2023): Stable  condition.  Urology (01/18/2024): Managed kidney stones, which were spontaneously passed.  Pulmonology (02/05/2024): SAM management discussed; high blood pressure noted, with CPAP recommended.  Cardiology (07/02/2024): Adjusted heart failure medication to low-dose Entresto and Lasix.  Gynecology (12/16/2024): Biopsy of vulvar lesion was benign.  CT Imaging (01/24/2025): Abdomen and pelvis CT negative.  Pulmonology (02/13/2025): SAM resolved with significant weight loss; dyspnea resolved.  Lab Results:  Pre-visit Labs (Various Dates): Consistently normal CBC, CMP, and lipid profiles. A1c improved from 6.1 to 5.4. TSH low but managed with current medication dose.  Imaging:  PET-CT (04/10/2023): Decreased PET avid lesion.  CT Urogram (03/27/2023): Negative.  Cystoscopy (04/24/2023): Normal.    Pre visit labs   CBC normal   CMP normal   Lipid normal   A1c 5.3   TSH mildly low free T4 normal      Today's visit:    History of Present Illness    HPI:  Alvina reports a respiratory illness that started 3-4 days ago, beginning with a sore throat, fever, and chills. This progressed to coughing, with voice loss occurring yesterday. She has been taking coricidin HBP and Tylenol for symptom relief, which has provided some improvement. She is concerned about flying with congestion for her daughter's upcoming surgery.    She complains of pain in her left elbow area, described as an ache similar to a toothache that pulses. The pain is located near the elbow, starting around the area and moving towards it. It does not always hurt during the day but wakes her up at night. The pain is not exacerbated by gripping or lifting things.    She reports symptoms suggestive of carpal tunnel syndrome in both hands for over 6 months. She describes a burning and stinging sensation, which can be severe. The symptoms are worse in her right hand (her dominant hand) and are most noticeable in the mornings and when performing activities like putting on  makeup or washing her hair. She has been wearing braces on both hands at night, which she reports helps alleviate the symptoms.    She mentions recurring pain in her left side, which has returned and now extends into her back. It is most noticeable at night or when she is not sitting straight, such as in her car. She was recently evaluated by a gastroenterologist who identified cysts on her liver but did not believe they were related to this pain.    Regarding her weight management, she has been taking Mounjaro 5mg weekly for diabetes management and weight loss. She initially lost weight, getting down to 163 lbs, but has since regained some weight. The medication is no longer suppressing her appetite as effectively as it did initially.    She denies current fever, chills, or difficulty gripping objects. She denies similar symptoms in her toes or feet.    MEDICATIONS:  - Mounjaro 5 mg, weekly, for diabetes and weight loss  - Entresto, for heart-related issues  - Lasix (Furosemide), as needed when swelling occurs after eating salty food  - Crestor (Rosuvastatin), 3 times per week, for cholesterol  - Levothyroxine 88 mcg, for thyroid issues  - Metformin, for diabetes  - Tylenol (Acetaminophen), as needed for pain, taken before sleep when pain is worse  - Coricidin HBP, for cold symptoms  - Discontinued gabapentin, previously taken for a few years for pain related to lung surgery  - Levothyroxine dosage changed to 88 mcg since December (about 2 months ago)  - Mounjaro dosing frequency changed from every 10 days to weekly    FAMILY HISTORY:  - : History of carpal tunnel syndrome, had surgery  - Daughter: Scheduled for uterine polyp removal surgery on Monday (upcoming)      ROS:  General: reports fever, reports chills  ENT: reports nasal congestion, reports sore throat, reports hoarseness  Respiratory: reports cough  Musculoskeletal: reports limb pain, reports burning sensation, reports pain with movement, reports  back pain         Patient Care Team:  Clement Phillips MD as PCP - General (Internal Medicine)  Yuriy Amor MD as Consulting Physician (Internal Medicine)  Radha Lu MD (Pulmonary Disease)  Edmund Cantrell MD as Consulting Physician (Cardiothoracic Surgery)  Wilbert Arredondo MD (Cardiology)  Chris Muro MD as Consulting Physician (Ophthalmology)  [facility] (Gastroenterology)  David Bansal MD (Ophthalmology)  Hayley Pink MD (Hematology and Oncology)  Clement Phillips MD as Diabetes Digital Medicine Responsible Provider (Internal Medicine)  Sofie Carrera, PharmD as Diabetes Digital Medicine Clinician (Pharmacist)  David Bansal MD (Ophthalmology)  Medicare, Digital Medicine as Diabetes Digital Medicine Contract      Patient Active Problem List    Diagnosis Date Noted    Other idiopathic scoliosis, thoracolumbar region 05/23/2024 5/23/24 XR Considerable scoliosis, convex toward the left at the thoracolumbar junction and toward the right at the lower lumbar level.  Vertebral body heights appear to be adequately maintained.   There appears to be severe hypertrophic spurring and disc space narrowing at the T12-L1 and L1-2 levels.  Probable mild disc space narrowing at the L2-3 and L5-S1 levels.Multilevel facet arthropathy.       Severe obesity (BMI 35.0-39.9) with comorbidity 01/09/2024    Urinary frequency 04/27/2023    Lack of coordination 04/27/2023    Pelvic floor weakness 04/27/2023    Abdominal aortic atherosclerosis incidental on CT; on statin 03/27/2023    Chronic heart failure with preserved ejection fraction 05/18/2022    Osteopenia of multiple sites 5/2024 repeat 2026; check L spine XR 04/01/2022 03/31/2022 DEXA L-spine 1.2, total hip-1.2, femoral neck-1.5.  FRAX score 1.2/9.1%.  Osteopenia.  Compared to previous, decrease in BMD at lumbar spine and total hip.  05/16/2024 DEXA L-spine 1.1, femoral neck -2.0, total hip  -1.3.  FRAX score 2.2/11%.  Compared to previous DEXA BMD stable.  Wide variation in BMD between L2 and L3.  Osteopenia.      Fatty liver 02/16/2022 09/08/2021 right upper quadrant ultrasound  Previous cholecystectomy. No biliary ductal dilatation is seen.  Diffusely increased parenchymal echogenicity of the liver most consistent with steatosis. There is no sonographic evidence of cirrhosis and no focal liver lesions are seen.      Class 1 obesity due to excess calories with serious comorbidity and body mass index (BMI) of 32.0 to 32.9 in adult 02/16/2022    Dyslipidemia 09/21/2020     10/26/2021 TTE LV normal size and thickness.  Normal LV systolic function LVEF 60-65%.  Normal diastolic function. RV normal size and systolic function.  10/26/2021 nuclear medicine stress test abnormal moderate size moderate intensity partially reversible defect mid distal anterior wall.  11/15/2021 left heart catheterization no evidence of angiographically significant coronary artery disease.  By report, elevated EDP.      Hyperplastic polyp of sigmoid colon 08/19/2020 8/31/2017 Colonoscopy 3 mm rectal polyp.  Large internal hemorrhoids.  Hyperplastic polyp      Chronic superficial gastritis without bleeding on EGD 2017 08/19/2020 8/31/2017 EGD normal esophagus.  Patchy mild erythema of antrum.  Biopsied.  Normal duodenum.  Biopsy mild chronic gastritis H pylori negative      Hypothyroidism due to Hashimoto's thyroiditis 07/08/2020    Type 2 diabetes mellitus with diabetic polyneuropathy, without long-term current use of insulin 06/25/2020    Hepatitis C antibody test positive but VL negative, either exposed/immune or false positive initial screening 02/17/2020    Carcinoid tumor of lung 02/07/2020     History of left pulmonary nodule.  Seen by outside CT surgery.  Had previously been sent to intervention Radiology but was unable to biopsy the lesion.  Increased in size from initial 0.7-1.3 cm over 3 years.    9/12/19  CT-guided biopsy:  LUNG, LEFT LOWER LOBE, CORE BIOPSY:   - SMALL FRAGMENTS OF BENIGN PULMONARY ALVEOLAR PARENCHYMA.   - NO EVIDENCE OF NEOPLASM OR GRANULOMATOUS DISEASE.   6/11/20 lung bx  LUNG, NEEDLE BIOPSY, CLINICALLY LEFT LOWER LOBE:  --ATYPICAL ADENOMATOUS HYPERPLASIA ( 0.5 MM LESION, AS MEASURED ON   SLIDE).   She underwent on July 31, 2020 a left lower lobe wedge resection of the mass with completion robotic lobectomy and mediastinal lymph node dissection.  The final pathology report showed the presence of low-grade carcinoid tumor measuring 1.20 cm that was low-grade with a Ki-67 of 3%. The margins were negative. The lymph node at the level 7, 8, 10 and 11 came negative for metastatic disease. There was no direct invasion of the adjacent structure no lymphovascular invasion. Her final stage was a pT1 pN0 pMX.  9/10/20 PET/CT neuroendocrine study:  1.   Postoperative changes of recent left lower lobe pulmonary nodule wedge resection with mild metabolic activity along the lateral margin of the suture line likely being postoperative in nature. Continued attention at this site is warranted on future imaging.  2.   No evidence of metastatic disease identified.  3/23/22 PET CT 1.   Stable postsurgical changes in the left lung without evidence of local recurrence or distant metastatic disease identified.      Family history of heart disease Dr. Arredondo 02/07/2020     10/03/2013 nuclear medicine stress test no significant EKG changes.  No chest pain. Normal perfusion scan.  Normal LV systolic function  10/03/2013 TTE normal LV size wall thickness and systolic function.  05/19/17 Nuclear stress test: Five minutes standard Dario protocol. 1 mm horizontal ST depression. Substernal chest heaviness and tightness, resolved in recovery. Perfusion scan normal.  05/17 Echocardiogram shows normal cardiac function. EF normal. Greater than 50%. Grade I diastolic function.  02/02/18 Coronary calcium score was 46 with 41 in the  LAD, circumflex was 5. Her liver measured 41 Hounsfield units. The spleen could not be visualized. These findings are consistent with fatty liver. The textural appearance to me of the liver was that of fatty liver. She had normal origin of the coronary arteries, normal configuration of the pulmonary veins, left atrium 35 mm, calcified plaque was also seen in the aortic annulus. Per Radiology, she had a 1 cm nodule in the left lower lobe. Radiology recommended followup in six months with CT.  6/22/20 nu med stress test  Nuclear scans show a small area of possible anterior wall ischemia. This could also be secondary to breast artefact. Clinical      correlation suggested.     Gated scan is wnl. EF 80%     Ekg portion of test is negative         Other specified glaucoma 02/07/2020    Chronic midline low back pain without sciatica hx of laminectomy L5S1; flexeril PRN 02/07/2020    Left lumbar radiculopathy from L sciatica, remote laminectomy but residual weakness/numbness L leg 02/07/2020    Arthritis of knee, right 05/03/2019    Complex tear of medial meniscus of right knee as current injury 05/03/2019    Mixed incontinence urge and stress (male)(female) 03/05/2013    Cystocele 03/05/2013    Rectocele 03/05/2013    Chronic constipation 03/05/2013    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       PAST MEDICAL PROBLEMS, PAST SURGICAL HISTORY: please see relevant portions of the electronic medical record    ALLERGIES AND MEDICATIONS: updated and reviewed.  Medication List with Changes/Refills   Current Medications    ACETAMINOPHEN (TYLENOL) 500 MG TABLET    Take 500 mg by mouth every 6 (six) hours as needed for Pain.    ALBUTEROL (VENTOLIN HFA) 90 MCG/ACTUATION INHALER    Inhale 2 puffs into the lungs every 6 (six) hours as needed for Wheezing. Rescue    ASPIRIN (ECOTRIN) 81 MG EC TABLET        AZELASTINE (ASTELIN) 137 MCG (0.1 %) NASAL SPRAY    1 spray (137 mcg total) by Nasal route 2 (two) times daily.    BRIMONIDINE 0.2%  "(ALPHAGAN) 0.2 % DROP        DIAZEPAM (VALIUM) 5 MG TABLET    Take 1 tablet (5 mg total) by mouth nightly as needed for Anxiety.    DORZOLAMIDE-TIMOLOL 2-0.5% (COSOPT) 22.3-6.8 MG/ML OPHTHALMIC SOLUTION        ENTRESTO 24-26 MG PER TABLET    Take 0.5 tablets by mouth 2 (two) times daily.    FUROSEMIDE (LASIX) 20 MG TABLET    Take 1 tablet (20 mg total) by mouth 3 (three) times a week.    ISOSORBIDE DINITRATE (ISORDIL) 5 MG TAB    Take 5 mg by mouth 3 (three) times daily.    LEVOTHYROXINE (SYNTHROID) 88 MCG TABLET    Take 1 tablet (88 mcg total) by mouth before breakfast.    METFORMIN (GLUCOPHAGE-XR) 500 MG ER 24HR TABLET    1 in AM 2 in PM    ROSUVASTATIN (CRESTOR) 10 MG TABLET    Take 1 tablet (10 mg total) by mouth nightly.    TIRZEPATIDE (MOUNJARO) 5 MG/0.5 ML PNIJ    Inject 5 mg into the skin every 7 days. Second month    VITAMIN D (VITAMIN D3) 1000 UNITS TAB             Objective:   Objective   Physical Exam   Vitals:    03/27/25 0801   BP: 120/74   Pulse: 76   Temp: 97.9 °F (36.6 °C)   TempSrc: Oral   SpO2: 97%   Weight: 76.1 kg (167 lb 12.3 oz)   Height: 5' 5" (1.651 m)    Body mass index is 27.92 kg/m².  Weight: 76.1 kg (167 lb 12.3 oz)   Height: 5' 5" (165.1 cm)     Physical Exam  Constitutional:       Appearance: She is well-developed.   HENT:      Right Ear: Tympanic membrane, ear canal and external ear normal.      Left Ear: Tympanic membrane, ear canal and external ear normal.   Eyes:      General: No scleral icterus.     Extraocular Movements: Extraocular movements intact.      Pupils: Pupils are equal, round, and reactive to light.   Cardiovascular:      Rate and Rhythm: Normal rate and regular rhythm.      Heart sounds: Normal heart sounds. No murmur heard.  Pulmonary:      Effort: Pulmonary effort is normal.      Breath sounds: Normal breath sounds. No wheezing.   Abdominal:      Palpations: Abdomen is soft. There is no hepatomegaly, splenomegaly or mass.      Tenderness: There is no abdominal " tenderness.   Musculoskeletal:         General: No deformity. Normal range of motion.      Cervical back: Neck supple.      Right lower leg: No edema.      Left lower leg: No edema.      Comments: The AC joint is mildly tender on the left but does not reproduce her pain.  External rotation elicits discomfort in the distal upper arm.  The medial and lateral epicondyles are nontender.  The elbow without effusion.  She is tender just proximal to the lateral epicondyle.  The wrists are unremarkable without swelling, the MCPs PIPs DIPs are unremarkable.  She notes some mild tenderness on palpation of the left lower ribcage in the back mid clavicular line to the mid axillary line without deformity or crepitus.   Lymphadenopathy:      Cervical: No cervical adenopathy.   Skin:     General: Skin is warm and dry.      Findings: No rash.      Comments: On exposed skin   Neurological:      Mental Status: She is alert and oriented to person, place, and time.      Deep Tendon Reflexes: Reflexes are normal and symmetric.   Psychiatric:         Behavior: Behavior normal.         Thought Content: Thought content normal.         Judgment: Judgment normal.         Component      Latest Ref Rng 9/17/2024 12/12/2024 2/10/2025 3/19/2025   Sodium      136 - 145 mmol/L    139    Potassium      3.5 - 5.1 mmol/L    3.9    Chloride      95 - 110 mmol/L    105    CO2      23 - 29 mmol/L    23    Glucose      70 - 110 mg/dL    83    BUN      8 - 23 mg/dL    17    Creatinine      0.5 - 1.4 mg/dL    0.7    Calcium      8.7 - 10.5 mg/dL    9.2    PROTEIN TOTAL      6.0 - 8.4 g/dL    7.3    Albumin      3.5 - 5.2 g/dL    4.0    BILIRUBIN TOTAL      0.1 - 1.0 mg/dL    0.4    ALP      40 - 150 U/L    75    AST      10 - 40 U/L    23    ALT      10 - 44 U/L    19    eGFR      >60 mL/min/1.73 m^2    >60.0    Anion Gap      8 - 16 mmol/L    11    WBC      3.90 - 12.70 K/uL    6.52    RBC      4.00 - 5.40 M/uL    4.45    Hemoglobin      12.0 - 16.0 g/dL     13.2    Hematocrit      37.0 - 48.5 %    41.5    MCV      82 - 98 fL    93    MCH      27.0 - 31.0 pg    29.7    MCHC      32.0 - 36.0 g/dL    31.8 (L)    RDW      11.5 - 14.5 %    13.9    Platelet Count      150 - 450 K/uL    201    MPV      9.2 - 12.9 fL    11.5    Cholesterol Total      120 - 199 mg/dL 96    120    Triglycerides      30 - 150 mg/dL 80    100    HDL      40 - 75 mg/dL 44 (L)    42    LDL Cholesterol      63.0 - 159.0 mg/dL 36    58.0 (L)    HDL/Cholesterol Ratio      20.0 - 50.0 % 2.2    35.0    Total Cholesterol/HDL Ratio      2.0 - 5.0     2.9    Non-HDL Cholesterol      mg/dL    78    Non HDL Chol. (LDL+VLDL)      <130 mg/dL (calc) 52       Urine Microalbumin      ug/mL    9.0    Urine Creatinine      15.0 - 325.0 mg/dL    155.0    MICROALB/CREAT RATIO      0.0 - 30.0 ug/mg    5.8    Hemoglobin A1C External      4.0 - 5.6 %  5.2   5.3    Estimated Avg Glucose      68 - 131 mg/dL    105    TSH      0.400 - 4.000 uIU/mL  0.06 (L)  0.572  0.353 (L)    Lipoprotein (a)      <75 nmol/L  <10      Free T4      0.71 - 1.51 ng/dL    1.16       Legend:  (L) Low

## 2025-03-26 NOTE — ASSESSMENT & PLAN NOTE
03/27/2025: Pre visit labs reviewed   Colonoscopy 2017 hyperplastic polyp due 2027   Followed by Gynecology  Reports she had EGD with dilation of stricture done in December by Gastroenterology.  Will try to get the report.  CT scan done 01/24/2025 showing nonspecific thickening of the distal esophagus and posterior gastric fundus similar to prior exam.

## 2025-03-26 NOTE — ASSESSMENT & PLAN NOTE
03/27/2025: Pre visit labs TSH mildly low free T4 normal.  On levothyroxine 88 since December.  Check future labs.  Dosing requirements may have changed with weight loss.  Orders:    TSH; Future

## 2025-03-26 NOTE — ASSESSMENT & PLAN NOTE
03/27/2025:  Managed by outside cardiology.  On Lasix, Entresto.  Does find the Entresto quite helpful.

## 2025-03-26 NOTE — ASSESSMENT & PLAN NOTE
03/27/2025:  Pre visit labs A1c excellent.  On metformin, Mounjaro.  She was spacing out her Mounjaro to every 10 days because of cost, with new insurance it is more affordable so she has been back to once weekly dosing.  But she notes that her appetite has not been as suppress as it was previously.  She would like to lose another 10 lb for health purposes-improved joint pain with weight loss.  But she is undecided about wanting to increase the dose.    For now no change on Mounjaro stay on 5 mg.  If she wants to increase it to 7.5 she can notify me.  Check future labs  Orders:    Comprehensive Metabolic Panel; Future    Lipid Panel; Future    Hemoglobin A1C; Future    CBC Without Differential; Future

## 2025-03-27 ENCOUNTER — PATIENT OUTREACH (OUTPATIENT)
Dept: ADMINISTRATIVE | Facility: HOSPITAL | Age: 75
End: 2025-03-27
Payer: MEDICARE

## 2025-03-27 ENCOUNTER — OFFICE VISIT (OUTPATIENT)
Dept: FAMILY MEDICINE | Facility: CLINIC | Age: 75
End: 2025-03-27
Payer: MEDICARE

## 2025-03-27 VITALS
OXYGEN SATURATION: 97 % | TEMPERATURE: 98 F | SYSTOLIC BLOOD PRESSURE: 120 MMHG | WEIGHT: 167.75 LBS | DIASTOLIC BLOOD PRESSURE: 74 MMHG | HEART RATE: 76 BPM | BODY MASS INDEX: 27.95 KG/M2 | HEIGHT: 65 IN

## 2025-03-27 DIAGNOSIS — Z00.00 NORMAL PHYSICAL EXAM: Primary | ICD-10-CM

## 2025-03-27 DIAGNOSIS — I50.32 CHRONIC HEART FAILURE WITH PRESERVED EJECTION FRACTION: ICD-10-CM

## 2025-03-27 DIAGNOSIS — J06.9 VIRAL URI: ICD-10-CM

## 2025-03-27 DIAGNOSIS — D3A.090 CARCINOID TUMOR OF LUNG, UNSPECIFIED WHETHER MALIGNANT: ICD-10-CM

## 2025-03-27 DIAGNOSIS — G56.03 BILATERAL CARPAL TUNNEL SYNDROME: ICD-10-CM

## 2025-03-27 DIAGNOSIS — E78.5 DYSLIPIDEMIA: ICD-10-CM

## 2025-03-27 DIAGNOSIS — M77.9 TENDONITIS: ICD-10-CM

## 2025-03-27 DIAGNOSIS — K63.5 HYPERPLASTIC POLYP OF SIGMOID COLON: ICD-10-CM

## 2025-03-27 DIAGNOSIS — E11.42 TYPE 2 DIABETES MELLITUS WITH DIABETIC POLYNEUROPATHY, WITHOUT LONG-TERM CURRENT USE OF INSULIN: ICD-10-CM

## 2025-03-27 DIAGNOSIS — I70.0 ABDOMINAL AORTIC ATHEROSCLEROSIS: ICD-10-CM

## 2025-03-27 DIAGNOSIS — E06.3 HYPOTHYROIDISM DUE TO HASHIMOTO'S THYROIDITIS: ICD-10-CM

## 2025-03-27 PROCEDURE — 3066F NEPHROPATHY DOC TX: CPT | Mod: CPTII,S$GLB,, | Performed by: INTERNAL MEDICINE

## 2025-03-27 PROCEDURE — 3008F BODY MASS INDEX DOCD: CPT | Mod: CPTII,S$GLB,, | Performed by: INTERNAL MEDICINE

## 2025-03-27 PROCEDURE — 1101F PT FALLS ASSESS-DOCD LE1/YR: CPT | Mod: CPTII,S$GLB,, | Performed by: INTERNAL MEDICINE

## 2025-03-27 PROCEDURE — 3074F SYST BP LT 130 MM HG: CPT | Mod: CPTII,S$GLB,, | Performed by: INTERNAL MEDICINE

## 2025-03-27 PROCEDURE — 99999 PR PBB SHADOW E&M-EST. PATIENT-LVL V: CPT | Mod: PBBFAC,,, | Performed by: INTERNAL MEDICINE

## 2025-03-27 PROCEDURE — 4010F ACE/ARB THERAPY RXD/TAKEN: CPT | Mod: CPTII,S$GLB,, | Performed by: INTERNAL MEDICINE

## 2025-03-27 PROCEDURE — 3044F HG A1C LEVEL LT 7.0%: CPT | Mod: CPTII,S$GLB,, | Performed by: INTERNAL MEDICINE

## 2025-03-27 PROCEDURE — 3288F FALL RISK ASSESSMENT DOCD: CPT | Mod: CPTII,S$GLB,, | Performed by: INTERNAL MEDICINE

## 2025-03-27 PROCEDURE — 3078F DIAST BP <80 MM HG: CPT | Mod: CPTII,S$GLB,, | Performed by: INTERNAL MEDICINE

## 2025-03-27 PROCEDURE — 99397 PER PM REEVAL EST PAT 65+ YR: CPT | Mod: S$GLB,,, | Performed by: INTERNAL MEDICINE

## 2025-03-27 PROCEDURE — 1126F AMNT PAIN NOTED NONE PRSNT: CPT | Mod: CPTII,S$GLB,, | Performed by: INTERNAL MEDICINE

## 2025-03-27 PROCEDURE — 3061F NEG MICROALBUMINURIA REV: CPT | Mod: CPTII,S$GLB,, | Performed by: INTERNAL MEDICINE

## 2025-03-27 RX ORDER — ROSUVASTATIN CALCIUM 10 MG/1
10 TABLET, COATED ORAL NIGHTLY
Qty: 90 TABLET | Refills: 3 | Status: SHIPPED | OUTPATIENT
Start: 2025-03-27

## 2025-03-27 RX ORDER — LEVOTHYROXINE SODIUM 88 UG/1
88 TABLET ORAL
Qty: 90 TABLET | Refills: 1 | Status: SHIPPED | OUTPATIENT
Start: 2025-03-27

## 2025-03-27 RX ORDER — TIRZEPATIDE 5 MG/.5ML
5 INJECTION, SOLUTION SUBCUTANEOUS
Qty: 12 PEN | Refills: 3 | Status: SHIPPED | OUTPATIENT
Start: 2025-03-27

## 2025-03-27 RX ORDER — TIRZEPATIDE 5 MG/.5ML
5 INJECTION, SOLUTION SUBCUTANEOUS
Qty: 12 PEN | Refills: 3 | Status: SHIPPED | OUTPATIENT
Start: 2025-03-27 | End: 2025-03-27 | Stop reason: SDUPTHER

## 2025-03-28 DIAGNOSIS — G56.03 BILATERAL CARPAL TUNNEL SYNDROME: Primary | ICD-10-CM

## 2025-03-29 ENCOUNTER — NURSE TRIAGE (OUTPATIENT)
Dept: ADMINISTRATIVE | Facility: CLINIC | Age: 75
End: 2025-03-29
Payer: MEDICARE

## 2025-03-29 ENCOUNTER — ON-DEMAND VIRTUAL (OUTPATIENT)
Dept: URGENT CARE | Facility: CLINIC | Age: 75
End: 2025-03-29
Payer: MEDICARE

## 2025-03-29 DIAGNOSIS — H10.9 CONJUNCTIVITIS, UNSPECIFIED CONJUNCTIVITIS TYPE, UNSPECIFIED LATERALITY: Primary | ICD-10-CM

## 2025-03-29 PROCEDURE — 98005 SYNCH AUDIO-VIDEO EST LOW 20: CPT | Mod: 95,,, | Performed by: NURSE PRACTITIONER

## 2025-03-29 RX ORDER — TOBRAMYCIN 3 MG/ML
1 SOLUTION/ DROPS OPHTHALMIC EVERY 6 HOURS
Qty: 5 ML | Refills: 0 | Status: SHIPPED | OUTPATIENT
Start: 2025-03-29 | End: 2025-04-03

## 2025-03-29 NOTE — PATIENT INSTRUCTIONS

## 2025-03-29 NOTE — PROGRESS NOTES
Subjective:      Patient ID: Alvina Fisher is a 74 y.o. female.    Vitals:  vitals were not taken for this visit.     Chief Complaint: Conjunctivitis      Visit Type: TELE AUDIOVISUAL    Patient Location: [facility]     Present with the patient at the time of consultation: TELEMED PRESENT WITH PATIENT: None    Past Medical History:   Diagnosis Date    Achilles tendon tear     Coronary artery disease     Diabetes mellitus     Dysplasia of cervix     GERD (gastroesophageal reflux disease)     Glaucoma (increased eye pressure)     Gross hematuria 4/27/2023    Hypertension     Lung nodule     Sinusitis     Sleep apnea     Thyroid disease     Urinary incontinence     occ urge     Past Surgical History:   Procedure Laterality Date    ACHILLES TENDON SURGERY      ARTHROSCOPIC CHONDROPLASTY OF KNEE JOINT Right 5/3/2019    Procedure: ARTHROSCOPY, KNEE, WITH CHONDROPLASTY;  Surgeon: Doug Alexander MD;  Location: [facility];  Service: Orthopedics;  Laterality: Right;    ARTHROSCOPY OF KNEE Right 5/3/2019    Procedure: ARTHROSCOPY, KNEE;  Surgeon: Doug Alexander MD;  Location: [facility];  Service: Orthopedics;  Laterality: Right;    CERVIX LESION DESTRUCTION      cryo x 2    CHOLECYSTECTOMY      EXCISION OF MEDIAL MENISCUS OF KNEE Right 5/3/2019    Procedure: MENISCECTOMY, KNEE, MEDIAL;  Surgeon: Doug Alexander MD;  Location: [facility];  Service: Orthopedics;  Laterality: Right;    HYSTERECTOMY      BLAKE/ovaries remain    INJECTION OF JOINT Right 5/3/2019    Procedure: INJECTION, JOINT - SYNVISE INJECTION;  Surgeon: Doug Alexander MD;  Location: [facility];  Service: Orthopedics;  Laterality: Right;  SYNVISE INJECTION FROM PHARMACY    LAMINECTOMY  1990    L5 S1    OOPHORECTOMY      TUBAL LIGATION       Review of patient's allergies indicates:   Allergen Reactions    Oxycodone-acetaminophen Itching and Nausea Only     NOT ALLERGIC TO ACETAMINOPHEN, HAS TAKEN IT     Codeine Itching    Codeine sulfate     Meperidine Itching     Oxycodone Itching and Nausea Only     Medications Ordered Prior to Encounter[1]  Family History   Problem Relation Name Age of Onset    Cancer Maternal Aunt          cervical    Breast cancer Maternal Aunt      Breast cancer Paternal Grandmother      Diabetes Paternal Grandmother      Stroke Mother      Diabetes Father      Stomach cancer Father      Asthma Sister      Heart disease Brother      Osteoarthritis Sister      No Known Problems Daughter      No Known Problems Daughter      No Known Problems Daughter Quinonez     Ovarian cancer Neg Hx         Medications Ordered                [id number] [place] [facility] [address] [facility]   [address] [address]    Telephone: [phone]   Fax: [phone]   Hours: Open 24 hours                         E-Prescribed (1 of 1)              tobramycin sulfate 0.3% (TOBREX) 0.3 % ophthalmic solution    Sig: Place 1 drop into the left eye every 6 (six) hours. for 5 days       Start: 3/29/25     Quantity: 5 mL Refills: 0                           Ohs Peq Odvv Intake    3/29/2025  7:55 AM CDT - Filed by Patient   What is your current physical address in the event of a medical emergency? [address] at the [facility] [address]   Are you able to take your vital signs? Yes   Systolic Blood Pressure: 140   Diastolic Blood Pressure: 85   Weight: 166   Height: 65   Pulse: 67   Temperature: 97.6   Respiration rate:    Pulse Oxygen:    Please attach any relevant images or files    Is your employer contracted with [facility]? No         74 y.o. F presents with c/o Left eye discharge, redness, itching x 1 day, hx of glaucoma, uses drops daily. Denies fever, pain in eye, vision changes, ha, or dizziness.  Does not wear contacts      Conjunctivitis  This is a new problem. The current episode started yesterday. The problem occurs constantly. Pertinent negatives include no chest pain, congestion, coughing, fever or headaches.        Constitution: Negative for fever.   HENT:  Negative for congestion.    Cardiovascular:  Negative for chest pain.   Eyes:  Positive for eye discharge, eye itching and eye redness. Negative for eye trauma, eye pain, photophobia, vision loss and double vision.   Respiratory:  Negative for cough and shortness of breath.    Neurological:  Negative for dizziness and headaches.        Objective:   The physical exam was conducted virtually.  Physical Exam   Constitutional: She is oriented to person, place, and time. No distress.   HENT:   Head: Normocephalic.   Ears:   Right Ear: External ear normal.   Left Ear: External ear normal.   Nose: No rhinorrhea or congestion.   Eyes: Left eye exhibits discharge. Left conjunctiva is injected.      Comments: See picture   Neck: Neck supple.   Pulmonary/Chest: Effort normal. No respiratory distress.   Neurological: She is alert and oriented to person, place, and time.   Skin: Skin is no rash.           Assessment:     1. Conjunctivitis, unspecified conjunctivitis type, unspecified laterality        Plan:   Use drops as directed  Replace mascara/eyeliner    If new or worsening symptoms f./u as directed     Conjunctivitis, unspecified conjunctivitis type, unspecified laterality  -     tobramycin sulfate 0.3% (TOBREX) 0.3 % ophthalmic solution; Place 1 drop into the left eye every 6 (six) hours. for 5 days  Dispense: 5 mL; Refill: 0    We appreciate you trusting us with your medical care. We hope you feel better soon. We will be happy to take care of you for all of your future medical needs.     You must understand that you've received Virtual treatment only and that you may be released before all your medical problems are known or treated. You, the patient, will arrange for follow up care as instructed.     Follow up with your PCP or specialty clinic as directed in the next 1-2 weeks if not improved or as needed. You can call [phone] to schedule an appointment with the  appropriate provider.     If your condition worsens we recommend that you receive another evaluation in person, with your primary care provider, urgent care or at the emergency room immediately or contact your primary medical clinics after hours call service to discuss your concerns.                     [1]   Current Outpatient Medications on File Prior to Visit   Medication Sig Dispense Refill    acetaminophen (TYLENOL) 500 MG tablet Take 500 mg by mouth every 6 (six) hours as needed for Pain.      albuterol (VENTOLIN HFA) 90 mcg/actuation inhaler Inhale 2 puffs into the lungs every 6 (six) hours as needed for Wheezing. Rescue 18 g 1    aspirin (ECOTRIN) 81 MG EC tablet       azelastine (ASTELIN) 137 mcg (0.1 %) nasal spray 1 spray (137 mcg total) by Nasal route 2 (two) times daily. 30 mL 11    brimonidine 0.2% (ALPHAGAN) 0.2 % Drop       diazePAM (VALIUM) 5 MG tablet Take 1 tablet (5 mg total) by mouth nightly as needed for Anxiety. 14 tablet 0    dorzolamide-timolol 2-0.5% (COSOPT) 22.3-6.8 mg/mL ophthalmic solution       ENTRESTO 24-26 mg per tablet Take 0.5 tablets by mouth 2 (two) times daily.      furosemide (LASIX) 20 MG tablet Take 1 tablet (20 mg total) by mouth 3 (three) times a week.      levothyroxine (SYNTHROID) 88 MCG tablet Take 1 tablet (88 mcg total) by mouth before breakfast. 90 tablet 1    metFORMIN (GLUCOPHAGE-XR) 500 MG ER 24hr tablet 1 in AM 2 in  tablet 3    rosuvastatin (CRESTOR) 10 MG tablet Take 1 tablet (10 mg total) by mouth nightly. 90 tablet 3    tirzepatide (MOUNJARO) 5 mg/0.5 mL PnIj Inject 5 mg into the skin every 7 days. Second month 12 Pen 3    vitamin D (VITAMIN D3) 1000 units Tab        No current facility-administered medications on file prior to visit.

## 2025-03-29 NOTE — TELEPHONE ENCOUNTER
"Reason for Disposition   [1] Mild eyelid irritation and eye redness are a recurrent problem AND [2] red and crusty eyelids    Additional Information   Negative: Chemical got in the eye   Negative: Piece of something got in the eye   Negative: SEVERE eye pain (e.g., excruciating pain and patient unable to do normal activities)   Negative: [1] Eyelid is very swollen (shut or almost) AND [2] fever   Negative: [1] Eyelid (outer) is very red (or tender to touch) AND [2] fever   Negative: [1] Foreign body sensation ("feels like something is in there") AND [2] no improvement after irrigation (regardless of duration of flushing)   Negative: Vomiting   Negative: [1] Recent eye surgery AND [2] increasing eye pain   Negative: Patient sounds very sick or weak to the triager   Negative: MODERATE eye pain or discomfort (e.g., interferes with normal activities or awakens from sleep; more than mild)   Negative: New or worsening blurred vision   Negative: Looking at light causes MODERATE to SEVERE eye pain (i.e., photophobia)   Negative: Cloudy spot or sore seen on the cornea (clear part of the eye)   Negative: Eyelid is very swollen (shut or almost)   Negative: Eyelid is red and painful (or tender to touch)   Negative: Eye pain present > 24 hours   Negative: [1] Bleeding on white of the eye AND [2] taking Coumadin (warfarin) or other strong blood thinner, or known bleeding disorder (e.g., thrombocytopenia)   Negative: Bleeding on white of the eye   Negative: [1] Only 1 eye is red AND [2] present > 48 hours   Negative: Red eye present more than 7 days    Protocols used: Eye - Redness-A-  Pt states she has pink eye and needs to be seen before leaving town tomorrow.  Advised per the Triage Protocol. Instructed to susan OOC back if new/worsening symptoms develop. Pt accepted an ODVV. Also advised of Urgent Care options today. Pt verbalized understanding.  "
15-May-2021

## 2025-04-10 ENCOUNTER — PATIENT MESSAGE (OUTPATIENT)
Dept: FAMILY MEDICINE | Facility: CLINIC | Age: 75
End: 2025-04-10
Payer: MEDICARE

## 2025-04-17 NOTE — PROGRESS NOTES
Assessment: 74 y.o. female with B carpal tunnel syndrome    I explained my diagnostic impression and the reasoning behind it in detail, using layman's terms.      Plan:   Injection of the bilateral carpal tunnels performed, please see procedure note for more details.  Prior to the injection risks and benefits of corticosteroid injection were discussed with the patient including pain, infection, bleeding, skin color changes, swelling, steroid flare. We discussed that over time injections can result in chondral damage, acceleration of arthritis formation, damage to tendons and damage to joints.  The patient consented for the procedure.  Post-injection instructions were given to the patient in writing.  - injection may help with trigger finger  - return to clinic as needed        This note was generated with the assistance of ambient listening technology. Verbal consent was obtained by the patient and accompanying visitor(s) for the recording of patient appointment to facilitate this note. I attest to having reviewed and edited the generated note for accuracy, though some syntax or spelling errors may persist. Please contact the author of this note for any clarification.     All questions were answered in detail. The patient is in full agreement with the treatment plan and will proceed accordingly.    Chief Complaint   Patient presents with    Right Hand - Pain, Numbness    Left Hand - Pain, Numbness     Initial visit (5/8/25): Alvina Fisher is a 74 y.o. female who presents today complaining of Pain and Numbness of the Right Hand and Pain and Numbness of the Left Hand    Alvina presents with bilateral hand pain and numbness for 9 months. Her right hand symptoms occur daily, while left hand is less frequently affected. Pain and tingling affect all fingers, initially starting with the thumb and first two fingers, now involving all digits. Symptoms do not easily subside like typical limb numbness. She reports a  triggering sensation in her right index finger, which becomes locked in flexion     Pain and numbness worsen during activities such as washing hair, brushing teeth, applying makeup, and holding a cell phone. Her  provided hand braces, which have helped by keeping her hands straight. She reports having a high pain tolerance.    She denies any formal medical diagnoses.    PREVIOUS TREATMENTS:  Minimal improvement with night splinting        This patient was seen in consultation at the request of Dr. Clement Phillips    This is the extent of the patient's complaints at this time.        Review of patient's allergies indicates:   Allergen Reactions    Oxycodone-acetaminophen Itching and Nausea Only     NOT ALLERGIC TO ACETAMINOPHEN, HAS TAKEN IT     Codeine Itching    Codeine sulfate     Meperidine Itching    Oxycodone Itching and Nausea Only       Current Medications[1]    Physical Exam:   Vitals:    05/08/25 0928   PainSc:   8   PainLoc: Hand       General:    Patient is alert, awake and oriented to time, place and person. Mood and affect are appropriate.  Patient does not appear to be in any distress, denies any constitutional symptoms and appears stated age.   HEENT:  Pupils are equal and round, sclera are not injected. External examination of ears and nose reveals no abnormalities. Cranial nerves II-X are grossly intact  Neck: examination demonstrates painless  active range of motion. Spurling's sign is negative  Skin:  no rashes, abrasions or open wounds on the affected extremity   Resp:  No respiratory distress or audible wheezing   CV: 2+  pulses, all extremities warm and well perfused   Bilateral Hand/Wrist Examination:    Observation/Inspection:  Swelling  none    Deformity  none  Discoloration  none     Scars   none    Atrophy  none    HAND/WRIST EXAMINATION:  Finkelstein's Test   Neg  WHAT Test    Neg  Snuff box tenderness   Neg  Rivera's Test    Neg  Hook of Hamate Tenderness  Neg  CMC  grind    Neg  Circumduction test   Neg    Neurovascular Exam:  Digits WWP, brisk CR < 3s throughout  NVI motor/LTS to M/R/U nerves, radial pulse 2+  Tinel's Test - Carpal Tunnel  positive  Tinel's Test - Cubital Tunnel  Neg  Phalen's Test    positive  Median Nerve Compression Test positive    ROM hand/wrist/elbow full, painless     Imaging: 3 views of the bilateral wrists show first CMC OA bilaterally. No fractures     I personally reviewed and interpreted the patient's imaging obtained today in clinic       A note notifying Dr. Clement Phillips of my findings was sent via the electronic medical record     This note was created by combination of typed  and M-Modal dictation. Transcription and phonetic errors may be present.  If there are any questions, please contact me.    Past Medical History:   Diagnosis Date    Achilles tendon tear     Coronary artery disease     Diabetes mellitus     Dysplasia of cervix     GERD (gastroesophageal reflux disease)     Glaucoma (increased eye pressure)     Gross hematuria 04/27/2023    Hx of colonic polyps     Hypertension     Lung nodule     Sinusitis     Sleep apnea     Thyroid disease     Urinary incontinence     occ urge       Active Problem List with Overview Notes    Diagnosis Date Noted    Other idiopathic scoliosis, thoracolumbar region 05/23/2024 5/23/24 XR Considerable scoliosis, convex toward the left at the thoracolumbar junction and toward the right at the lower lumbar level.  Vertebral body heights appear to be adequately maintained.   There appears to be severe hypertrophic spurring and disc space narrowing at the T12-L1 and L1-2 levels.  Probable mild disc space narrowing at the L2-3 and L5-S1 levels.Multilevel facet arthropathy.       Severe obesity (BMI 35.0-39.9) with comorbidity 01/09/2024    Urinary frequency 04/27/2023    Lack of coordination 04/27/2023    Pelvic floor weakness 04/27/2023    Abdominal aortic atherosclerosis incidental on CT; on  statin 03/27/2023    Chronic heart failure with preserved ejection fraction 05/18/2022    Osteopenia of multiple sites 5/2024 repeat 2026; check L spine XR 04/01/2022 03/31/2022 DEXA L-spine 1.2, total hip-1.2, femoral neck-1.5.  FRAX score 1.2/9.1%.  Osteopenia.  Compared to previous, decrease in BMD at lumbar spine and total hip.  05/16/2024 DEXA L-spine 1.1, femoral neck -2.0, total hip -1.3.  FRAX score 2.2/11%.  Compared to previous DEXA BMD stable.  Wide variation in BMD between L2 and L3.  Osteopenia.      Fatty liver 02/16/2022 09/08/2021 right upper quadrant ultrasound  Previous cholecystectomy. No biliary ductal dilatation is seen.  Diffusely increased parenchymal echogenicity of the liver most consistent with steatosis. There is no sonographic evidence of cirrhosis and no focal liver lesions are seen.      Class 1 obesity due to excess calories with serious comorbidity and body mass index (BMI) of 32.0 to 32.9 in adult 02/16/2022    Dyslipidemia 09/21/2020     10/26/2021 TTE LV normal size and thickness.  Normal LV systolic function LVEF 60-65%.  Normal diastolic function. RV normal size and systolic function.  10/26/2021 nuclear medicine stress test abnormal moderate size moderate intensity partially reversible defect mid distal anterior wall.  11/15/2021 left heart catheterization no evidence of angiographically significant coronary artery disease.  By report, elevated EDP.      Hyperplastic polyp of sigmoid colon 08/19/2020 8/31/2017 Colonoscopy 3 mm rectal polyp.  Large internal hemorrhoids.  Hyperplastic polyp      Chronic superficial gastritis without bleeding on EGD 2017 08/19/2020 8/31/2017 EGD normal esophagus.  Patchy mild erythema of antrum.  Biopsied.  Normal duodenum.  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  Seen by outside CT surgery.  Had previously been sent to intervention Radiology but was unable to biopsy the lesion.  Increased in size from initial 0.7-1.3 cm over 3 years.    9/12/19 CT-guided biopsy:  LUNG, LEFT LOWER LOBE, CORE BIOPSY:   - SMALL FRAGMENTS OF BENIGN PULMONARY ALVEOLAR PARENCHYMA.   - NO EVIDENCE OF NEOPLASM OR GRANULOMATOUS DISEASE.   6/11/20 lung bx  LUNG, NEEDLE BIOPSY, CLINICALLY LEFT LOWER LOBE:  --ATYPICAL ADENOMATOUS HYPERPLASIA ( 0.5 MM LESION, AS MEASURED ON   SLIDE).   She underwent on July 31, 2020 a left lower lobe wedge resection of the mass with completion robotic lobectomy and mediastinal lymph node dissection.  The final pathology report showed the presence of low-grade carcinoid tumor measuring 1.20 cm that was low-grade with a Ki-67 of 3%. The margins were negative. The lymph node at the level 7, 8, 10 and 11 came negative for metastatic disease. There was no direct invasion of the adjacent structure no lymphovascular invasion. Her final stage was a pT1 pN0 pMX.  9/10/20 PET/CT neuroendocrine study:  1.   Postoperative changes of recent left lower lobe pulmonary nodule wedge resection with mild metabolic activity along the lateral margin of the suture line likely being postoperative in nature. Continued attention at this site is warranted on future imaging.  2.   No evidence of metastatic disease identified.  3/23/22 PET CT 1.   Stable postsurgical changes in the left lung without evidence of local recurrence or distant metastatic disease identified.      Family history of heart disease Dr. Arredondo 02/07/2020     10/03/2013 nuclear medicine stress test no significant EKG changes.  No chest pain. Normal perfusion scan.  Normal LV systolic function  10/03/2013 TTE normal LV  size wall thickness and systolic function.  05/19/17 Nuclear stress test: Five minutes standard Dario protocol. 1 mm horizontal ST depression. Substernal chest heaviness and tightness, resolved in recovery. Perfusion scan normal.  05/17 Echocardiogram shows normal cardiac function. EF normal. Greater than 50%. Grade I diastolic function.  02/02/18 Coronary calcium score was 46 with 41 in the LAD, circumflex was 5. Her liver measured 41 Hounsfield units. The spleen could not be visualized. These findings are consistent with fatty liver. The textural appearance to me of the liver was that of fatty liver. She had normal origin of the coronary arteries, normal configuration of the pulmonary veins, left atrium 35 mm, calcified plaque was also seen in the aortic annulus. Per Radiology, she had a 1 cm nodule in the left lower lobe. Radiology recommended followup in six months with CT.  6/22/20 nu med stress test  Nuclear scans show a small area of possible anterior wall ischemia. This could also be secondary to breast artefact. Clinical      correlation suggested.     Gated scan is wnl. EF 80%     Ekg portion of test is negative         Other specified glaucoma 02/07/2020    Chronic midline low back pain without sciatica hx of laminectomy L5S1; flexeril PRN 02/07/2020    Left lumbar radiculopathy from L sciatica, remote laminectomy but residual weakness/numbness L leg 02/07/2020    Arthritis of knee, right 05/03/2019    Complex tear of medial meniscus of right knee as current injury 05/03/2019    Mixed incontinence urge and stress (male)(female) 03/05/2013    Cystocele 03/05/2013    Rectocele 03/05/2013    Chronic constipation 03/05/2013    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       Past Surgical History:   Procedure Laterality Date    ACHILLES TENDON SURGERY      ARTHROSCOPIC CHONDROPLASTY OF KNEE JOINT Right 05/03/2019    Procedure: ARTHROSCOPY, KNEE, WITH CHONDROPLASTY;  Surgeon: Doug Alexander MD;  Location: [facility] OR;   Service: Orthopedics;  Laterality: Right;    ARTHROSCOPY OF KNEE Right 05/03/2019    Procedure: ARTHROSCOPY, KNEE;  Surgeon: Doug Alexander MD;  Location: [facility], [place], operated by [facility] OR;  Service: Orthopedics;  Laterality: Right;    CERVIX LESION DESTRUCTION      cryo x 2    CHOLECYSTECTOMY      COLONOSCOPY  09/21/2022    EXCISION OF MEDIAL MENISCUS OF KNEE Right 05/03/2019    Procedure: MENISCECTOMY, KNEE, MEDIAL;  Surgeon: Doug Alexander MD;  Location: [facility], [place], operated by [facility] OR;  Service: Orthopedics;  Laterality: Right;    HYSTERECTOMY      BLAKE/ovaries remain    INJECTION OF JOINT Right 05/03/2019    Procedure: INJECTION, JOINT - SYNVISE INJECTION;  Surgeon: Doug Alexander MD;  Location: [facility], [place], operated by [facility] OR;  Service: Orthopedics;  Laterality: Right;  SYNVISE INJECTION FROM PHARMACY    LAMINECTOMY  1990    L5 S1    OOPHORECTOMY      TUBAL LIGATION         Family History   Problem Relation Name Age of Onset    Cancer Maternal Aunt          cervical    Breast cancer Maternal Aunt      Breast cancer Paternal Grandmother      Diabetes Paternal Grandmother      Stroke Mother      Diabetes Father      Stomach cancer Father      Asthma Sister      Heart disease Brother      Osteoarthritis Sister      No Known Problems Daughter      No Known Problems Daughter      No Known Problems Daughter Quinonez     Ovarian cancer Neg Hx                  [1]   Current Outpatient Medications:     acetaminophen (TYLENOL) 500 MG tablet, Take 500 mg by mouth every 6 (six) hours as needed for Pain., Disp: , Rfl:     albuterol (VENTOLIN HFA) 90 mcg/actuation inhaler, Inhale 2 puffs into the lungs every 6 (six) hours as needed for Wheezing. Rescue, Disp: 18 g, Rfl: 1    aspirin (ECOTRIN) 81 MG EC tablet, , Disp: , Rfl:     azelastine (ASTELIN) 137 mcg (0.1 %) nasal spray, 1 spray (137 mcg total) by Nasal route 2 (two) times daily., Disp: 30 mL, Rfl: 11    brimonidine 0.2% (ALPHAGAN) 0.2 % Drop, , Disp: , Rfl:     diazePAM (VALIUM) 5 MG tablet, Take 1 tablet (5 mg total) by mouth nightly  as needed for Anxiety., Disp: 14 tablet, Rfl: 0    dorzolamide-timolol 2-0.5% (COSOPT) 22.3-6.8 mg/mL ophthalmic solution, , Disp: , Rfl:     ENTRESTO 24-26 mg per tablet, Take 0.5 tablets by mouth 2 (two) times daily., Disp: , Rfl:     furosemide (LASIX) 20 MG tablet, Take 1 tablet (20 mg total) by mouth 3 (three) times a week., Disp: , Rfl:     levothyroxine (SYNTHROID) 88 MCG tablet, Take 1 tablet (88 mcg total) by mouth before breakfast., Disp: 90 tablet, Rfl: 1    metFORMIN (GLUCOPHAGE-XR) 500 MG ER 24hr tablet, 1 in AM 2 in PM, Disp: 270 tablet, Rfl: 3    rosuvastatin (CRESTOR) 10 MG tablet, Take 1 tablet (10 mg total) by mouth nightly., Disp: 90 tablet, Rfl: 3    tirzepatide (MOUNJARO) 5 mg/0.5 mL PnIj, Inject 5 mg into the skin every 7 days. Second month, Disp: 12 Pen, Rfl: 3    vitamin D (VITAMIN D3) 1000 units Tab, , Disp: , Rfl:

## 2025-04-28 NOTE — TELEPHONE ENCOUNTER
Denies any pain or discomfort,Has good appetite,Sched meds  Tolerated well,Plan for dc home today.Assisted needs.   Refill Routing Note   Medication(s) are not appropriate for processing by [facility] for the following reason(s):        New or recently adjusted medication: 5mg never dispensed    [facility] action(s):  Defer  Quick Discontinue      Medication Therapy Plan:         Appointments  past 12m or future 3m with PCP    Date Provider   Last Visit   3/15/2024 Clement Phillips MD   Next Visit   9/27/2024 Clement Phillips MD   ED visits in past 90 days: 0        Note composed:5:01 PM 05/23/2024

## 2025-05-08 ENCOUNTER — PATIENT MESSAGE (OUTPATIENT)
Dept: FAMILY MEDICINE | Facility: CLINIC | Age: 75
End: 2025-05-08
Payer: MEDICARE

## 2025-05-08 ENCOUNTER — OFFICE VISIT (OUTPATIENT)
Dept: ORTHOPEDICS | Facility: CLINIC | Age: 75
End: 2025-05-08
Payer: MEDICARE

## 2025-05-08 ENCOUNTER — APPOINTMENT (OUTPATIENT)
Dept: RADIOLOGY | Facility: HOSPITAL | Age: 75
End: 2025-05-08
Attending: ORTHOPAEDIC SURGERY
Payer: MEDICARE

## 2025-05-08 DIAGNOSIS — G56.03 BILATERAL CARPAL TUNNEL SYNDROME: ICD-10-CM

## 2025-05-08 DIAGNOSIS — D3A.090 CARCINOID TUMOR OF LUNG, UNSPECIFIED WHETHER MALIGNANT: Primary | ICD-10-CM

## 2025-05-08 PROCEDURE — 20526 THER INJECTION CARP TUNNEL: CPT | Mod: 50,S$GLB,, | Performed by: ORTHOPAEDIC SURGERY

## 2025-05-08 PROCEDURE — 73110 X-RAY EXAM OF WRIST: CPT | Mod: TC,50,FY,PN

## 2025-05-08 PROCEDURE — 3044F HG A1C LEVEL LT 7.0%: CPT | Mod: CPTII,S$GLB,, | Performed by: ORTHOPAEDIC SURGERY

## 2025-05-08 PROCEDURE — 3061F NEG MICROALBUMINURIA REV: CPT | Mod: CPTII,S$GLB,, | Performed by: ORTHOPAEDIC SURGERY

## 2025-05-08 PROCEDURE — 1160F RVW MEDS BY RX/DR IN RCRD: CPT | Mod: CPTII,S$GLB,, | Performed by: ORTHOPAEDIC SURGERY

## 2025-05-08 PROCEDURE — 73110 X-RAY EXAM OF WRIST: CPT | Mod: 26,50,, | Performed by: RADIOLOGY

## 2025-05-08 PROCEDURE — 3066F NEPHROPATHY DOC TX: CPT | Mod: CPTII,S$GLB,, | Performed by: ORTHOPAEDIC SURGERY

## 2025-05-08 PROCEDURE — 1125F AMNT PAIN NOTED PAIN PRSNT: CPT | Mod: CPTII,S$GLB,, | Performed by: ORTHOPAEDIC SURGERY

## 2025-05-08 PROCEDURE — 4010F ACE/ARB THERAPY RXD/TAKEN: CPT | Mod: CPTII,S$GLB,, | Performed by: ORTHOPAEDIC SURGERY

## 2025-05-08 PROCEDURE — 99999 PR PBB SHADOW E&M-EST. PATIENT-LVL III: CPT | Mod: PBBFAC,,, | Performed by: ORTHOPAEDIC SURGERY

## 2025-05-08 PROCEDURE — 1159F MED LIST DOCD IN RCRD: CPT | Mod: CPTII,S$GLB,, | Performed by: ORTHOPAEDIC SURGERY

## 2025-05-08 PROCEDURE — 99204 OFFICE O/P NEW MOD 45 MIN: CPT | Mod: 25,S$GLB,, | Performed by: ORTHOPAEDIC SURGERY

## 2025-05-08 RX ADMIN — METHYLPREDNISOLONE ACETATE 40 MG: 40 INJECTION, SUSPENSION INTRA-ARTICULAR; INTRALESIONAL; INTRAMUSCULAR; SOFT TISSUE at 09:05

## 2025-05-13 RX ORDER — METHYLPREDNISOLONE ACETATE 40 MG/ML
40 INJECTION, SUSPENSION INTRA-ARTICULAR; INTRALESIONAL; INTRAMUSCULAR; SOFT TISSUE
Status: DISCONTINUED | OUTPATIENT
Start: 2025-05-08 | End: 2025-05-13 | Stop reason: HOSPADM

## 2025-05-13 NOTE — PROCEDURES
Carpal Tunnel: R carpal tunnel, L carpal tunnel    Date/Time: 5/8/2025 9:45 AM    Performed by: Briana Qiu MD  Authorized by: Briana Qiu MD    Consent Done?:  Yes (Verbal)  Indications:  Pain  Timeout: prior to procedure the correct patient, procedure, and site was verified    Location:  Wrist  Site:  R carpal tunnel and L carpal tunnel  Needle size:  22 G  Approach:  Volar  Medications:  40 mg methylPREDNISolone acetate 40 mg/mL; 40 mg methylPREDNISolone acetate 40 mg/mL  Patient tolerance:  Patient tolerated the procedure well with no immediate complications

## 2025-06-13 ENCOUNTER — TELEPHONE (OUTPATIENT)
Dept: PULMONOLOGY | Facility: CLINIC | Age: 75
End: 2025-06-13
Payer: MEDICARE

## 2025-06-18 ENCOUNTER — TELEPHONE (OUTPATIENT)
Dept: FAMILY MEDICINE | Facility: CLINIC | Age: 75
End: 2025-06-18
Payer: MEDICARE

## 2025-06-18 DIAGNOSIS — D3A.090 CARCINOID TUMOR OF LUNG, UNSPECIFIED WHETHER MALIGNANT: Primary | ICD-10-CM

## 2025-06-18 NOTE — TELEPHONE ENCOUNTER
Copied from CRM #[id number]. Topic: General Inquiry - Return Call  >> Jun 17, 2025  2:15 PM Hortensia wrote:  Type: Patient Call Back    Who called: [facility]    What is the request in detail: pt needs a new P/A sent to [facility]. The P/A has to have referral to [facility]    Can the clinic reply by [facility]? No     Would the patient rather a call back or a response via [facility]? call    Best call back number: [phone] or [phone]    Additional Information:

## 2025-06-27 ENCOUNTER — PATIENT MESSAGE (OUTPATIENT)
Dept: FAMILY MEDICINE | Facility: CLINIC | Age: 75
End: 2025-06-27
Payer: MEDICARE

## 2025-06-27 DIAGNOSIS — E11.42 TYPE 2 DIABETES MELLITUS WITH DIABETIC POLYNEUROPATHY, WITHOUT LONG-TERM CURRENT USE OF INSULIN: Primary | ICD-10-CM

## 2025-06-27 DIAGNOSIS — Z12.31 VISIT FOR SCREENING MAMMOGRAM: ICD-10-CM

## 2025-07-01 NOTE — TELEPHONE ENCOUNTER
Care Due:                  Date            Visit Type   Department     Provider  --------------------------------------------------------------------------------                                JENNIFER -         [facility] FAMILY MED                              PRIMARY      / INTERNAL MED  Last Visit: 03-      CARE ([facility])   / ROGER Phillips                              [facility] FAMILY MED                              PRIMARY      / INTERNAL MED  Next Visit: 09-      CARE ([facility])   / ROGER Phillips                                                            Last  Test          Frequency    Reason                     Performed    Due Date  --------------------------------------------------------------------------------    HBA1C.......  6 months...  metFORMIN, tirzepatide...  03-   09-    [facility] Embedded Care Due Messages. Reference number: [id number].   7/01/2025 4:08:41 PM CDT

## 2025-07-08 NOTE — ASSESSMENT & PLAN NOTE
Has upcoming consult with [facility] for possible lung biopsy given abnormal findings on PET-CT with elevated metabolic activity.  Saw outside interventional pulmonology who had considered bronchoscopy with EBUS.  Reports that he could not guarantee success.  She has been trying to see [facility] but insurance will not approve it.

## 2025-07-08 NOTE — ASSESSMENT & PLAN NOTE
Last A1c excellent on metformin and Mounjaro.  However if pancreatitis present, we will need to stop GLP1.  May just see how she does on metformin monotherapy.

## 2025-07-08 NOTE — PROGRESS NOTES
This note was created by combination of typed  and M-Modal dictation.  Transcription errors may be present.   This note was also generated with the assistance of ambient listening technology. Verbal consent was obtained by the patient and accompanying visitor(s) for the recording of patient appointment to facilitate this note. I attest to having reviewed and edited the generated note for accuracy, though some syntax or spelling errors may persist. Please contact the author of this note for any clarification.    Assessment and Plan:   Assessment and Plan    Assessment & Plan  LUQ pain  History left upper quadrant/left-sided chest discomfort previously attributed to thoracic surgery and neuropathy, previously gabapentin helped.  Was able to eventually stopped taking it   But seems to have developed recurrence of pain.  Somewhat reminiscent of previous   Notes constipation which is also new   Pain seems to be in the left upper quadrant area inferior ribcage in some pain in the back as well.    She stopped her Mounjaro for the past 2 weeks thinking it could be complications/side effect such as pancreatitis, and pain is persisting  Check CBC, CMP, lipase   Check x-ray KUB 1st stool burden   If high stool burden would work on bowel hygiene but consider other pathology such as pancreatitis as original etiology causing pain   If x-ray, labs unremarkable and pain persisting would check CT abdomen pelvis.  Last CT 01/2025 was unremarkable.  CT chest done in May, the visualized abdominal structures were unremarkable but limited visualization.  If confirms pancreatitis, we will need alternative to Mounjaro.  Orders:    gabapentin (NEURONTIN) 300 MG capsule; Take 1 capsule (300 mg total) by mouth 3 (three) times daily.    CBC Auto Differential; Future    Comprehensive Metabolic Panel; Future    Lipase; Future    Urinalysis, Reflex to Urine Culture Urine, Clean Catch; Future    X-Ray Abdomen AP 1 View;  Future    Carcinoid tumor of lung, unspecified whether malignant  [facility] lung biopsy given abnormal findings on PET-CT with elevated metabolic activity.  Saw outside interventional pulmonology who had considered bronchoscopy with EBUS.  Reports that he could not guarantee success.  She has been trying to see [facility] but insurance will not approve it.       Type 2 diabetes mellitus with diabetic polyneuropathy, without long-term current use of insulin  Last A1c excellent on metformin and Mounjaro.  However if pancreatitis present, we will need to stop GLP1.  May just see how she does on metformin monotherapy.           There are no discontinued medications.    meds sent this encounter:     Medications Ordered This Encounter   Medications    gabapentin (NEURONTIN) 300 MG capsule     Sig: Take 1 capsule (300 mg total) by mouth 3 (three) times daily.     Dispense:  90 capsule     Refill:  11        Follow Up:   Future Appointments   Date Time Provider Department Center   2025  1:00 PM Clement Phillips MD [facility] [place]   7/10/2025  4:00 PM [facility]   2025  8:00 AM [facility] [place]   2025  7:00 AM SPECIMEN, [facility]ERVIN [facility] [place]   2025  8:15 AM LAB, [facility] [place]   2025  8:00 AM Clement Phillips MD [facility] [place]         Subjective:   Subjective   Chief Complaint   Patient presents with    LUQ pain       HPI  Alvina is a 74 y.o. female.    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   Tobacco Use    Smoking status: Former     Current packs/day: 0.00     Types: Cigarettes     Quit date: 1990     Years since quittin.2    Smokeless tobacco: Never   Substance and Sexual Activity    Alcohol use: Yes     Comment: occ    Drug use: No    Sexual activity: Yes      Social Drivers of Health     Financial Resource Strain: Low Risk  (5/1/2025)    Overall Financial Resource Strain (CARDIA)     Difficulty of Paying Living Expenses: Not hard at all   Food Insecurity: No Food Insecurity (5/1/2025)    Hunger Vital Sign     Worried About Running Out of Food in the Last Year: Never true     Ran Out of Food in the Last Year: Never true   Transportation Needs: No Transportation Needs (5/1/2025)    PRAPARE - Transportation     Lack of Transportation (Medical): No     Lack of Transportation (Non-Medical): No   Physical Activity: Insufficiently Active (5/1/2025)    Exercise Vital Sign     Days of Exercise per Week: 4 days     Minutes of Exercise per Session: 30 min   Stress: No Stress Concern Present (5/1/2025)    [employer] - Occupational Stress Questionnaire     Feeling of Stress : Only a little   Housing Stability: Low Risk  (5/1/2025)    Housing Stability Vital Sign     Unable to Pay for Housing in the Last Year: No     Number of Times Moved in the Last Year: 0     Homeless in the Last Year: No       No LMP recorded. Patient has had a hysterectomy.  The chief complaint leading to consultation is:  Left lower quadrant/left chest pain  Patient located in the [facility] at the time of the encounter.  Visit type: Virtual visit with synchronous audio and video  Each patient to whom he or she provides medical services by telemedicine is:  (1) informed of the relationship between the physician and patient and the respective role of any other health care provider with respect to management of the patient; and (2) notified that he or she may decline to receive medical services by telemedicine and may withdraw from such care at any time.    Notes:    Last appointment with this clinic was 3/27/2025. Last visit with me 3/27/2025   To summarize last visit and events leading up to today:    DM:  Metformin.  History of Trulicity stopped due to cost.  03/2024  trial of Mounjaro.  Heart Failure with Preserved Ejection Fraction (HFpEF): Managed with Entresto (half tablet b.i.d.) and Lasix  09/2023 cardiology stopped Entresto due to cost.  Changed back to valsartan.  Restarted on Entresto 07/2024  Dyslipidemia:  Statin.  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 Status post-resection (07/2020)  Obstructive Sleep Apnea (SAM): Initially on CPAP, but patient is intolerant. Significant weight loss has been achieved, and no repeat testing is currently planned.  Hypothyroidism: Managed with Levothyroxine 100 mcg  Osteopenia:   05/16/2024 DEXA L-spine 1.1, femoral neck -2.0, total hip -1.3. FRAX score 2.2/11%. Compared to previous DEXA BMD stable. Wide variation in BMD between L2 and L3.   XR Considerable scoliosis, convex toward the left at the thoracolumbar junction and toward the right at the lower lumbar level. Vertebral body heights appear to be adequately maintained. There appears to be severe hypertrophic spurring and disc space narrowing at the T12-L1 and L1-2 levels. Probable mild disc space narrowing at the L2-3 and L5-S1 levels.Multilevel facet arthropathy. No definite evidence of acute fracture or osseous destruction.  Kidney stones spontaneously passed 01/2024  CT Urogram (03/27/2023): Negative.  Cystoscopy (04/24/2023): Normal.  Urge urinary incontinence pelvic floor PT recommended by Urology  Vulvar lesion, status post biopsy, benign  Chest wall pain after surgical resection.  Gabapentin.  Chronic back pain    Last visit me 03/27/2025 for physical exam followed by Gynecology.  Colonoscopy due 2027.  Reportedly had EGD with stricture dilatation done in December will try to get that report  Diabetes stable on metformin and Mounjaro  HFpEF followed by outside cardiology stable Lasix and Entresto   Lipid statin   Hypothyroid on levothyroxine TSH mildly low free T4 normal   Carcinoid tumor of the lung followed by  outside Heme-Onc  Carpal tunnel referred to ortho    Never got results of EGD    04/21/2025 PET-CT  1. Soft tissue density at the surgical scar in the anterior left lung base having metabolic activity above background. Follow-up recommended to local recurrence.  2. Other activity is physiologic. No abnormal foci to suggest distant metastatic disease.     Saw Heme-Onc in follow up recommended lung biopsy    05/08/2025 saw orthopedics.  Bilateral carpal tunnel injection  .  Saw cardiology 05/13/2025.  Stable no changes    05/15/2025 CT chest  No convincing evidence for new or progressive disease in the chest.  2 mm right middle lobe nodules probably postinfectious or inflammatory but can be followed at subsequent exams. This may have been present on recent PET/CT but somewhat difficult to determine. There was no abnormal activity in this region on 4/21/2025 PET/CT.  Other findings as discussed above.     Saw Interventional pulmonology 05/22/2025.  Plan for robotic bronchoscopy and sampling with EBUS    Saw Heme-Onc 05/23/2025.  Plan for 2nd opinion from [facility] follow up in August 01/24/2025 CT abdomen pelvis with contrast was unremarkable   Sees [facility]    Lab Results   Component Value Date    HGBA1C 5.3 03/19/2025     Lab Results   Component Value Date    CHOL 120 03/19/2025    TRIG 100 03/19/2025    HDL 42 03/19/2025    LDLCALC 58.0 (L) 03/19/2025    NONHDLCHOL 78 03/19/2025       Today's visit:    History of Present Illness    HPI:  Alvina reports pain underneath her rib cage, which she has had previously. The pain originates in the middle of the left upper quadrant area, under the rib cage, and extends laterally. She also has back pain, though less frequently than before. The pain is constant and persistent, sometimes sharp, with a severe episode last week that nearly prompted her to seek emergency care.    Pain worsens when lying supine or on her right side, but improves when lying on her left side.  Direct pressure on the area provides some relief. She is unable to lie supine due to the pain. She also reports increased constipation, which sometimes exacerbates the pain during bowel movements, but having a bowel movement does not alleviate the pain.    She was previously prescribed gabapentin, a yellow capsule medication, for this pain following a thoracotomy. She discontinued it as she felt it was no longer necessary, but the pain has since recurred.    A recent PET scan showed an area of increased uptake in her lung, without a discrete nodule identified. This finding has led to discussions about a possible biopsy. She was evaluated by a doctor at [facility] in mid-May who considered a bronchial biopsy but could not guarantee success due to the location of the area of concern.    She discontinued Mounjaro for 2 weeks to determine if it might be causing pancreatic pain, but this did not affect her symptoms. She has been attempting to schedule an appointment at [facility] since April 23rd but has faced delays. She has an upcoming appointment with a pulmonologist, Dr. Goodson, who is an [facility] physician affiliated with [facility].      ROS:  Gastrointestinal: reports constipation, reports pain with defecation  Musculoskeletal: reports back pain, reports pain with movement         Answers submitted by the patient for this visit:  Abdominal Pain Questionnaire (Submitted on 7/8/2025)  Chief Complaint: Abdominal pain  Chronicity: recurrent  Onset: 1 to 4 weeks ago  Onset quality: gradual  Frequency: 2 to 4 times per day  Episode duration: 4 Hours  Progression since onset: waxing and waning  Pain location: LUQ, epigastric region  Pain - numeric: 6/10  Pain quality: aching, burning, a sensation of fullness, sharp  anorexia: No  arthralgias: Yes  belching: No  constipation: Yes  diarrhea: No  dysuria: No  fever: No  flatus: Yes  frequency: No  headaches: No  hematochezia: No  hematuria: No  melena: No  myalgias:  No  nausea: Yes  weight loss: No  vomiting: No  Aggravated by: certain positions, coughing  Relieved by: certain positions  Diagnostic workup: CT scan  Pain severity: moderate  Treatments tried: acetaminophen, antacids, proton pump inhibitors  Improvement on treatment: no relief  abdominal surgery: Yes  colon cancer: No  Crohn's disease: No  gallstones: Yes  GERD: Yes  irritable bowel syndrome: No  kidney stones: Yes  pancreatitis: No  PUD: No  ulcerative colitis: No  UTI: Yes      Patient Care Team:  Clement Phillips MD as PCP - General (Internal Medicine)  Yuriy Amor MD as Consulting Physician (Internal Medicine)  Radha Lu MD (Pulmonary Disease)  Edmund Cantrell MD as Consulting Physician (Cardiothoracic Surgery)  Wilbert Arredondo MD (Cardiology)  Chris Muro MD as Consulting Physician (Ophthalmology)  [facility] (Gastroenterology)  David Bansal MD (Ophthalmology)  Hayley Pink MD (Hematology and Oncology)  Clement Phillips MD as Diabetes Digital Medicine Responsible Provider (Internal Medicine)  Sofie Carrera, PharmD as Diabetes Digital Medicine Clinician (Pharmacist)  David Bansal MD (Ophthalmology)  [employer] [facility] [employer]      Patient Active Problem List    Diagnosis Date Noted    Other idiopathic scoliosis, thoracolumbar region 05/23/2024 5/23/24 XR Considerable scoliosis, convex toward the left at the thoracolumbar junction and toward the right at the lower lumbar level.  Vertebral body heights appear to be adequately maintained.   There appears to be severe hypertrophic spurring and disc space narrowing at the T12-L1 and L1-2 levels.  Probable mild disc space narrowing at the L2-3 and L5-S1 levels.Multilevel facet arthropathy.       Severe obesity (BMI 35.0-39.9) with comorbidity 01/09/2024    Urinary frequency 04/27/2023    Lack of coordination 04/27/2023    Pelvic floor weakness 04/27/2023     Abdominal aortic atherosclerosis incidental on CT; on statin 03/27/2023    Chronic heart failure with preserved ejection fraction 05/18/2022    Osteopenia of multiple sites 5/2024 repeat 2026; check L spine XR 04/01/2022 03/31/2022 DEXA L-spine 1.2, total hip-1.2, femoral neck-1.5.  FRAX score 1.2/9.1%.  Osteopenia.  Compared to previous, decrease in BMD at lumbar spine and total hip.  05/16/2024 DEXA L-spine 1.1, femoral neck -2.0, total hip -1.3.  FRAX score 2.2/11%.  Compared to previous DEXA BMD stable.  Wide variation in BMD between L2 and L3.  Osteopenia.      Fatty liver 02/16/2022 09/08/2021 right upper quadrant ultrasound  Previous cholecystectomy. No biliary ductal dilatation is seen.  Diffusely increased parenchymal echogenicity of the liver most consistent with steatosis. There is no sonographic evidence of cirrhosis and no focal liver lesions are seen.      Class 1 obesity due to excess calories with serious comorbidity and body mass index (BMI) of 32.0 to 32.9 in adult 02/16/2022    Dyslipidemia 09/21/2020     10/26/2021 TTE LV normal size and thickness.  Normal LV systolic function LVEF 60-65%.  Normal diastolic function. RV normal size and systolic function.  10/26/2021 nuclear medicine stress test abnormal moderate size moderate intensity partially reversible defect mid distal anterior wall.  11/15/2021 left heart catheterization no evidence of angiographically significant coronary artery disease.  By report, elevated EDP.      Hyperplastic polyp of sigmoid colon 08/19/2020 8/31/2017 Colonoscopy 3 mm rectal polyp.  Large internal hemorrhoids.  Hyperplastic polyp      Chronic superficial gastritis without bleeding on EGD 2017 08/19/2020 8/31/2017 EGD normal esophagus.  Patchy mild erythema of antrum.  Biopsied.  Normal duodenum.  Biopsy mild chronic gastritis H pylori negative      Hypothyroidism due to Hashimoto's thyroiditis 07/08/2020    Type 2 diabetes mellitus with diabetic  polyneuropathy, without long-term current use of insulin 06/25/2020    Hepatitis C antibody test positive but VL negative, either exposed/immune or false positive initial screening 02/17/2020    Carcinoid tumor of lung 02/07/2020     History of left pulmonary nodule.  Seen by outside CT surgery.  Had previously been sent to intervention Radiology but was unable to biopsy the lesion.  Increased in size from initial 0.7-1.3 cm over 3 years.    9/12/19 CT-guided biopsy:  LUNG, LEFT LOWER LOBE, CORE BIOPSY:   - SMALL FRAGMENTS OF BENIGN PULMONARY ALVEOLAR PARENCHYMA.   - NO EVIDENCE OF NEOPLASM OR GRANULOMATOUS DISEASE.   6/11/20 lung bx  LUNG, NEEDLE BIOPSY, CLINICALLY LEFT LOWER LOBE:  --ATYPICAL ADENOMATOUS HYPERPLASIA ( 0.5 MM LESION, AS MEASURED ON   SLIDE).   She underwent on July 31, 2020 a left lower lobe wedge resection of the mass with completion robotic lobectomy and mediastinal lymph node dissection.  The final pathology report showed the presence of low-grade carcinoid tumor measuring 1.20 cm that was low-grade with a Ki-67 of 3%. The margins were negative. The lymph node at the level 7, 8, 10 and 11 came negative for metastatic disease. There was no direct invasion of the adjacent structure no lymphovascular invasion. Her final stage was a pT1 pN0 pMX.  9/10/20 PET/CT neuroendocrine study:  1.   Postoperative changes of recent left lower lobe pulmonary nodule wedge resection with mild metabolic activity along the lateral margin of the suture line likely being postoperative in nature. Continued attention at this site is warranted on future imaging.  2.   No evidence of metastatic disease identified.  3/23/22 PET CT 1.   Stable postsurgical changes in the left lung without evidence of local recurrence or distant metastatic disease identified.      Family history of heart disease Dr. Arredondo 02/07/2020     10/03/2013 nuclear medicine stress test no significant EKG changes.  No chest pain. Normal perfusion scan.   Normal LV systolic function  10/03/2013 TTE normal LV size wall thickness and systolic function.  05/19/17 Nuclear stress test: Five minutes standard Dario protocol. 1 mm horizontal ST depression. Substernal chest heaviness and tightness, resolved in recovery. Perfusion scan normal.  05/17 Echocardiogram shows normal cardiac function. EF normal. Greater than 50%. Grade I diastolic function.  02/02/18 Coronary calcium score was 46 with 41 in the LAD, circumflex was 5. Her liver measured 41 Hounsfield units. The spleen could not be visualized. These findings are consistent with fatty liver. The textural appearance to me of the liver was that of fatty liver. She had normal origin of the coronary arteries, normal configuration of the pulmonary veins, left atrium 35 mm, calcified plaque was also seen in the aortic annulus. Per Radiology, she had a 1 cm nodule in the left lower lobe. Radiology recommended followup in six months with CT.  6/22/20 nu med stress test  Nuclear scans show a small area of possible anterior wall ischemia. This could also be secondary to breast artefact. Clinical      correlation suggested.     Gated scan is wnl. EF 80%     Ekg portion of test is negative         Other specified glaucoma 02/07/2020    Chronic midline low back pain without sciatica hx of laminectomy L5S1; flexeril PRN 02/07/2020    Left lumbar radiculopathy from L sciatica, remote laminectomy but residual weakness/numbness L leg 02/07/2020    Arthritis of knee, right 05/03/2019    Complex tear of medial meniscus of right knee as current injury 05/03/2019    Mixed incontinence urge and stress (male)(female) 03/05/2013    Cystocele 03/05/2013    Rectocele 03/05/2013    Chronic constipation 03/05/2013    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       PAST MEDICAL PROBLEMS, PAST SURGICAL HISTORY: please see relevant portions of the electronic medical record    ALLERGIES AND MEDICATIONS: updated and reviewed.  Medication List with  Changes/Refills   Current Medications    ACETAMINOPHEN (TYLENOL) 500 MG TABLET    Take 500 mg by mouth every 6 (six) hours as needed for Pain.    ALBUTEROL (VENTOLIN HFA) 90 MCG/ACTUATION INHALER    Inhale 2 puffs into the lungs every 6 (six) hours as needed for Wheezing. Rescue    ASPIRIN (ECOTRIN) 81 MG EC TABLET        AZELASTINE (ASTELIN) 137 MCG (0.1 %) NASAL SPRAY    1 spray (137 mcg total) by Nasal route 2 (two) times daily.    BRIMONIDINE 0.2% (ALPHAGAN) 0.2 % DROP        DIAZEPAM (VALIUM) 5 MG TABLET    Take 1 tablet (5 mg total) by mouth nightly as needed for Anxiety.    DORZOLAMIDE-TIMOLOL 2-0.5% (COSOPT) 22.3-6.8 MG/ML OPHTHALMIC SOLUTION        ENTRESTO 24-26 MG PER TABLET    Take 0.5 tablets by mouth 2 (two) times daily.    FUROSEMIDE (LASIX) 20 MG TABLET    Take 1 tablet (20 mg total) by mouth 3 (three) times a week.    LEVOTHYROXINE (SYNTHROID) 88 MCG TABLET    Take 1 tablet (88 mcg total) by mouth before breakfast.    METFORMIN (GLUCOPHAGE-XR) 500 MG ER 24HR TABLET    1 in AM 2 in PM    ROSUVASTATIN (CRESTOR) 10 MG TABLET    Take 1 tablet (10 mg total) by mouth nightly.    TIRZEPATIDE 7.5 MG/0.5 ML PNIJ    Inject 7.5 mg into the skin every 7 days.    VITAMIN D (VITAMIN D3) 1000 UNITS TAB             Objective:   Objective   Physical Exam   There were no vitals filed for this visit. There is no height or weight on file to calculate BMI.            Physical Exam  Constitutional:       General: She is not in acute distress.     Appearance: Normal appearance. She is not ill-appearing.   HENT:      Head: Normocephalic.   Pulmonary:      Effort: Pulmonary effort is normal.   Neurological:      General: No focal deficit present.      Mental Status: She is alert.   Psychiatric:         Mood and Affect: Mood normal.         Behavior: Behavior normal.         Thought Content: Thought content normal.         Judgment: Judgment normal.

## 2025-07-09 ENCOUNTER — OFFICE VISIT (OUTPATIENT)
Dept: FAMILY MEDICINE | Facility: CLINIC | Age: 75
End: 2025-07-09
Payer: MEDICARE

## 2025-07-09 DIAGNOSIS — E11.42 TYPE 2 DIABETES MELLITUS WITH DIABETIC POLYNEUROPATHY, WITHOUT LONG-TERM CURRENT USE OF INSULIN: ICD-10-CM

## 2025-07-09 DIAGNOSIS — D3A.090 CARCINOID TUMOR OF LUNG, UNSPECIFIED WHETHER MALIGNANT: ICD-10-CM

## 2025-07-09 DIAGNOSIS — R10.12 LUQ PAIN: Primary | ICD-10-CM

## 2025-07-09 PROCEDURE — 3066F NEPHROPATHY DOC TX: CPT | Mod: CPTII,95,, | Performed by: INTERNAL MEDICINE

## 2025-07-09 PROCEDURE — 4010F ACE/ARB THERAPY RXD/TAKEN: CPT | Mod: CPTII,95,, | Performed by: INTERNAL MEDICINE

## 2025-07-09 PROCEDURE — G2211 COMPLEX E/M VISIT ADD ON: HCPCS | Mod: 95,,, | Performed by: INTERNAL MEDICINE

## 2025-07-09 PROCEDURE — 1160F RVW MEDS BY RX/DR IN RCRD: CPT | Mod: CPTII,95,, | Performed by: INTERNAL MEDICINE

## 2025-07-09 PROCEDURE — 3061F NEG MICROALBUMINURIA REV: CPT | Mod: CPTII,95,, | Performed by: INTERNAL MEDICINE

## 2025-07-09 PROCEDURE — 98006 SYNCH AUDIO-VIDEO EST MOD 30: CPT | Mod: 95,,, | Performed by: INTERNAL MEDICINE

## 2025-07-09 PROCEDURE — 1159F MED LIST DOCD IN RCRD: CPT | Mod: CPTII,95,, | Performed by: INTERNAL MEDICINE

## 2025-07-09 PROCEDURE — 3044F HG A1C LEVEL LT 7.0%: CPT | Mod: CPTII,95,, | Performed by: INTERNAL MEDICINE

## 2025-07-09 RX ORDER — GABAPENTIN 300 MG/1
300 CAPSULE ORAL 3 TIMES DAILY
Qty: 90 CAPSULE | Refills: 11 | Status: SHIPPED | OUTPATIENT
Start: 2025-07-09 | End: 2026-07-09

## 2025-07-10 ENCOUNTER — OFFICE VISIT (OUTPATIENT)
Dept: PULMONOLOGY | Facility: CLINIC | Age: 75
End: 2025-07-10
Payer: MEDICARE

## 2025-07-10 ENCOUNTER — HOSPITAL ENCOUNTER (OUTPATIENT)
Dept: RADIOLOGY | Facility: HOSPITAL | Age: 75
Discharge: HOME | End: 2025-07-10
Attending: INTERNAL MEDICINE
Payer: MEDICARE

## 2025-07-10 VITALS
HEIGHT: 65 IN | DIASTOLIC BLOOD PRESSURE: 60 MMHG | WEIGHT: 168.19 LBS | BODY MASS INDEX: 28.02 KG/M2 | OXYGEN SATURATION: 97 % | SYSTOLIC BLOOD PRESSURE: 100 MMHG | HEART RATE: 76 BPM

## 2025-07-10 DIAGNOSIS — Z85.110 PERSONAL HISTORY OF MALIGNANT CARCINOID TUMOR OF BRONCHUS AND LUNG: ICD-10-CM

## 2025-07-10 DIAGNOSIS — R91.1 SOLITARY PULMONARY NODULE: Primary | ICD-10-CM

## 2025-07-10 DIAGNOSIS — R10.12 LUQ PAIN: ICD-10-CM

## 2025-07-10 PROBLEM — E66.01 SEVERE OBESITY (BMI 35.0-39.9) WITH COMORBIDITY: Status: RESOLVED | Noted: 2024-01-09 | Resolved: 2025-07-10

## 2025-07-10 PROCEDURE — 3288F FALL RISK ASSESSMENT DOCD: CPT | Mod: CPTII,S$GLB,, | Performed by: INTERNAL MEDICINE

## 2025-07-10 PROCEDURE — 74018 RADEX ABDOMEN 1 VIEW: CPT | Mod: TC,FY,PO

## 2025-07-10 PROCEDURE — 3008F BODY MASS INDEX DOCD: CPT | Mod: CPTII,S$GLB,, | Performed by: INTERNAL MEDICINE

## 2025-07-10 PROCEDURE — 3074F SYST BP LT 130 MM HG: CPT | Mod: CPTII,S$GLB,, | Performed by: INTERNAL MEDICINE

## 2025-07-10 PROCEDURE — 3044F HG A1C LEVEL LT 7.0%: CPT | Mod: CPTII,S$GLB,, | Performed by: INTERNAL MEDICINE

## 2025-07-10 PROCEDURE — 99204 OFFICE O/P NEW MOD 45 MIN: CPT | Mod: S$GLB,,, | Performed by: INTERNAL MEDICINE

## 2025-07-10 PROCEDURE — 74018 RADEX ABDOMEN 1 VIEW: CPT | Mod: 26,,, | Performed by: RADIOLOGY

## 2025-07-10 PROCEDURE — 3066F NEPHROPATHY DOC TX: CPT | Mod: CPTII,S$GLB,, | Performed by: INTERNAL MEDICINE

## 2025-07-10 PROCEDURE — 1159F MED LIST DOCD IN RCRD: CPT | Mod: CPTII,S$GLB,, | Performed by: INTERNAL MEDICINE

## 2025-07-10 PROCEDURE — 4010F ACE/ARB THERAPY RXD/TAKEN: CPT | Mod: CPTII,S$GLB,, | Performed by: INTERNAL MEDICINE

## 2025-07-10 PROCEDURE — 99999 PR PBB SHADOW E&M-EST. PATIENT-LVL V: CPT | Mod: PBBFAC,,, | Performed by: INTERNAL MEDICINE

## 2025-07-10 PROCEDURE — 3078F DIAST BP <80 MM HG: CPT | Mod: CPTII,S$GLB,, | Performed by: INTERNAL MEDICINE

## 2025-07-10 PROCEDURE — 1101F PT FALLS ASSESS-DOCD LE1/YR: CPT | Mod: CPTII,S$GLB,, | Performed by: INTERNAL MEDICINE

## 2025-07-10 PROCEDURE — 3061F NEG MICROALBUMINURIA REV: CPT | Mod: CPTII,S$GLB,, | Performed by: INTERNAL MEDICINE

## 2025-07-10 NOTE — PROGRESS NOTES
Subjective:       Patient ID: Alvina Fisher is a 74 y.o. female.      CHIEF COMPLAINT:  Patient presents for second opinion and evaluation.  History of carcinoid tumor history and to discuss recent imaging findings showing a new soft tissue density.    HPI:  Patient has a history of carcinoid tumor of the lung, for which she underwent a left lower lobectomy approximately 5 years ago. She has been undergoing annual follow-up imaging, including PET scans. On her most recent PET scan in April 2025, a new area of concern was identified. The scan showed a soft tissue density adjacent to the surgical scar in the left anterior lung base, measuring 3.5 by 1 cm with metabolic activity (maximum SUV of 2.9).    This finding prompted her oncologist at [facility] to recommend a biopsy. Initially, plans were made for a hospital admission for the biopsy, but the procedure was cancelled due to the lesion's proximity to the heart. She was then referred to [facility] for consideration of a robotic bronchoscopy. The physician there (Dr. Shah) expressed uncertainty about obtaining a sample if the lesion was on the outside of the lung.    Following these consultations, she attempted to secure an appointment at [facility] [place] but has faced insurance-related delays.    Patient reports constant pain on her left side, described as a sensation of weight. She states it previously felt like an electrical sensation but now is a constant squeezing sensation. She also reports a new cough, which she describes as more of a tickle in her throat rather than originating from her lungs.    Her surgical history includes a left lower lobectomy for a 1.2 cm typical carcinoid tumor with a KI67 of 3%. Multiple lymph nodes were sampled during the surgery and were negative for malignancy. She had the lobectomy during the COVID-19 pandemic.    Patient denies severe obesity, though she mentions past weight loss. She denies any current  use of diabetes medication injections, having stopped them 2 weeks ago due to pain concerns.  On Mounjaro previously     MEDICATIONS:  Patient was on Mounjaro (tirzepatide) for diabetes management and weight loss but discontinued it 2 weeks ago due to concerns about severe pain. She has been recently prescribed Gabapentin for pain management of post thoracotomy pain  but has not yet started taking it.    MEDICAL HISTORY:  Patient has a history of low-grade typical carcinoid of the lung, diagnosed 5 years ago. She also has a history of elevated A1C.    FAMILY HISTORY:  Family history is significant for father who  of stomach cancer.    SURGICAL HISTORY:  Patient underwent a left lower lobectomy 5 years ago for a carcinoid tumor. A 1.2 cm typical carcinoid was removed with negative lymph nodes and no pleural involvement.    IMAGING:  Patient has undergone multiple PET scans over the years. The most recent PET scan on 2025, revealed a soft tissue density adjacent to the scar in the left anterior lung base, measuring 3.5 by 1, with some metabolic activity and a maximum SUV of 2.9. Previous PET scans from 2024, 2023, , and  showed focal mildly hyper  metabolic uptake in the left hilum with SUV values of 2.5, 2.07, 1.42, and 2.22 respectively. A CT renal stone study on 2024, showed postsurgical changes of the left lower lobe without soft tissue component. The most recent CT in May 2025 revealed nodularity with airways leading to it.    See images personally reviewed with patient and  below.     SOCIAL HISTORY:  Smoking: Quit in , was a social smoker    ROS:  Respiratory: +cough        Review of Systems   All other systems reviewed and are negative.      Objective:      Physical Exam   Constitutional: She is oriented to person, place, and time. She appears well-developed and well-nourished.   HENT:   Head: Normocephalic.   Cardiovascular: Normal rate and regular  "rhythm.   Pulmonary/Chest: Normal expansion. She has no wheezes. She has no rales.   Musculoskeletal:         General: No edema. Normal range of motion.      Cervical back: Normal range of motion and neck supple.   Lymphadenopathy: No supraclavicular adenopathy is present.     She has no cervical adenopathy.   Neurological: She is alert and oriented to person, place, and time.   Skin: Skin is warm and dry.   Psychiatric: She has a normal mood and affect.   Vitals reviewed.    Personal Diagnostic Review  Physical Exam             See imaging in HPI and images below.  Personally reviewed with patient and       7/10/2025     4:17 PM 3/27/2025     8:01 AM 9/27/2024    10:19 AM 3/15/2024    10:22 AM 1/18/2024    10:45 AM 10/20/2023     9:43 AM 9/12/2023    10:34 AM   Pulmonary Function Tests   SpO2 97 % 97 % 96 % 98 %   96 %   Height 5' 5" (1.651 m) 5' 5" (1.651 m) 5' 5" (1.651 m) 5' 5" (1.651 m)   5' 5" (1.651 m)   Weight 76.3 kg (168 lb 3.4 oz) 76.1 kg (167 lb 12.3 oz) 77.6 kg (170 lb 15.5 oz) 88.5 kg (195 lb) 89 kg (196 lb 3.4 oz) 89.2 kg (196 lb 10.4 oz) 89.5 kg (197 lb 6.8 oz)   BMI (Calculated) 28 27.9 28.5 32.4   32.9         Assessment:       1. Solitary pulmonary nodule    2. Personal history of malignant carcinoid tumor of bronchus and lung        Encounter Medications[1]  Orders Placed This Encounter   Procedures    CT Chest Without Contrast     Standing Status:   Future     Expected Date:   8/4/2025     Expiration Date:   7/10/2026     May the Radiologist modify the order per protocol to meet the clinical needs of the patient?:   Yes     Is this a  Screening?:   No    Case Request Operating Room: ROBOTIC BRONCHOSCOPY     Standing Status:   Standing     Number of Occurrences:   1     Medical Necessity::   Medically Urgent [101]     Case classification:   E - Elective [90]     Post-Procedure Disposition::   Amb Surgery/DOSC [2]     Is an on-site pathologist required for this procedure?:   N/A "     Plan:     Problem List Items Addressed This Visit    None  Visit Diagnoses         Solitary pulmonary nodule    -  Primary    Relevant Orders    CT Chest Without Contrast    Case Request Operating Room: ROBOTIC BRONCHOSCOPY (Completed)      Personal history of malignant carcinoid tumor of bronchus and lung        Relevant Orders    CT Chest Without Contrast    Case Request Operating Room: ROBOTIC BRONCHOSCOPY (Completed)          Assessment & Plan    R91.1 Solitary pulmonary nodule  Z85.110 Personal history of malignant carcinoid tumor of bronchus and lung    IMPRESSION:  - Reviewed history of carcinoid tumor in left lower lobe, surgically resected 5 years ago.  - Evaluated recent imaging showing soft tissue density adjacent to surgical scar in left anterior lung base, measuring 3.5 x 1 cm with SUV 2.9 on PET scan.  - Noted consistent low-level PET activity in left hilar region over past several years (SUV ranging from 1.42 to 2.5).  - Assessed CT images, identified airway leading to nodular area, suggesting feasibility of bronchoscopic biopsy.  - Robotic bronchoscopy with biopsy is appropriate next step to evaluate concerning area, despite low suspicion for recurrence given history of low-grade carcinoid.  - Plan to sample lymph nodes during bronchoscopy procedure as well.  - Considered watchful waiting with repeat imaging as alternative approach, but opted for more definitive evaluation.    SOLITARY PULMONARY NODULE:  - Provided information on robotic bronchoscopy procedure, including its ability to access peripheral lung lesions.  - Ordered robotic bronchoscopy with endobronchial US for biopsy of left lung nodule and lymph node sampling on August 15th.  - Discontinued Mounjaro: Stop taking 1 week before scheduled bronchoscopy procedure.  - Follow up on August 15th for robotic bronchoscopy procedure.  - Contact the office if any questions or concerns arise before scheduled procedure date.    PERSONAL HISTORY  OF MALIGNANT CARCINOID TUMOR OF BRONCHUS AND LUNG:  - Explained nature of carcinoid tumors, emphasizing their typically slow-growing characteristics.  - Discussed limitations of PET scans in detecting slow-growing tumors like carcinoids.  - Educated on post-op changes and potential for ongoing inflammation at surgical site.    PLAN SUMMARY:  - Discontinue Mounjaro 1 week before scheduled bronchoscopy  - Ordered robotic bronchoscopy with endobronchial US for biopsy of left lung nodule and lymph node sampling  - Contact office if questions or concerns arise before procedure  - Follow-up on August 15th for robotic bronchoscopy procedure            I have explained the risks, benefits and alternatives of the procedure in detail.  The patient voices understanding and all questions have been answered.  The patient agrees to proceed as planned.     This note was generated with the assistance of ambient listening technology. Verbal consent was obtained by the patient and accompanying visitor(s) for the recording of patient appointment to facilitate this note. I attest to having reviewed and edited the generated note for accuracy, though some syntax or spelling errors may persist. Please contact the author of this note for any clarification.     Soft tissue component visualized on PET imaging 4/21/2025        Mild hypermetabolic activity     CT of abdomen 2024 with post surgical changes.  No nodular component.     Below are CT and PET images with nodular component from 2025             [1]   Outpatient Encounter Medications as of 7/10/2025   Medication Sig Dispense Refill    acetaminophen (TYLENOL) 500 MG tablet Take 500 mg by mouth every 6 (six) hours as needed for Pain.      albuterol (VENTOLIN HFA) 90 mcg/actuation inhaler Inhale 2 puffs into the lungs every 6 (six) hours as needed for Wheezing. Rescue 18 g 1    aspirin (ECOTRIN) 81 MG EC tablet       azelastine (ASTELIN) 137 mcg (0.1 %) nasal spray 1 spray (137 mcg  total) by Nasal route 2 (two) times daily. 30 mL 11    brimonidine 0.2% (ALPHAGAN) 0.2 % Drop       diazePAM (VALIUM) 5 MG tablet Take 1 tablet (5 mg total) by mouth nightly as needed for Anxiety. 14 tablet 0    dorzolamide-timolol 2-0.5% (COSOPT) 22.3-6.8 mg/mL ophthalmic solution       ENTRESTO 24-26 mg per tablet Take 0.5 tablets by mouth 2 (two) times daily.      furosemide (LASIX) 20 MG tablet Take 1 tablet (20 mg total) by mouth 3 (three) times a week.      levothyroxine (SYNTHROID) 88 MCG tablet Take 1 tablet (88 mcg total) by mouth before breakfast. 90 tablet 1    metFORMIN (GLUCOPHAGE-XR) 500 MG ER 24hr tablet 1 in AM 2 in  tablet 3    rosuvastatin (CRESTOR) 10 MG tablet Take 1 tablet (10 mg total) by mouth nightly. 90 tablet 3    vitamin D (VITAMIN D3) 1000 units Tab       gabapentin (NEURONTIN) 300 MG capsule Take 1 capsule (300 mg total) by mouth 3 (three) times daily. (Patient not taking: Reported on 7/10/2025) 90 capsule 11    tirzepatide 7.5 mg/0.5 mL PnIj Inject 7.5 mg into the skin every 7 days. (Patient not taking: Reported on 7/10/2025) 4 Pen 3     No facility-administered encounter medications on file as of 7/10/2025.

## 2025-07-28 DIAGNOSIS — E06.3 HYPOTHYROIDISM DUE TO HASHIMOTO'S THYROIDITIS: ICD-10-CM

## 2025-07-28 NOTE — TELEPHONE ENCOUNTER
No care due was identified.  [facility] Embedded Care Due Messages. Reference number: [id number].   7/28/2025 5:08:56 PM CDT

## 2025-07-29 RX ORDER — LEVOTHYROXINE SODIUM 88 UG/1
88 TABLET ORAL
Qty: 30 TABLET | Refills: 0 | Status: SHIPPED | OUTPATIENT
Start: 2025-07-29

## 2025-07-29 NOTE — TELEPHONE ENCOUNTER
Refill Routing Note   Medication(s) are not appropriate for processing by [facility] for the following reason(s):        Required labs abnormal    [facility] action(s):  Defer               Appointments  past 12m or future 3m with PCP    Date Provider   Last Visit   7/9/2025 Clement Phillips MD   Next Visit   9/29/2025 Clement Phillips MD   ED visits in past 90 days: 0        Note composed:7:52 AM 07/29/2025

## 2025-07-31 ENCOUNTER — HOSPITAL ENCOUNTER (OUTPATIENT)
Dept: RADIOLOGY | Facility: HOSPITAL | Age: 75
Discharge: HOME | End: 2025-07-31
Attending: INTERNAL MEDICINE
Payer: MEDICARE

## 2025-07-31 DIAGNOSIS — Z12.31 VISIT FOR SCREENING MAMMOGRAM: ICD-10-CM

## 2025-07-31 PROCEDURE — 77067 SCR MAMMO BI INCL CAD: CPT | Mod: TC,PO

## 2025-08-07 ENCOUNTER — HOSPITAL ENCOUNTER (OUTPATIENT)
Dept: RADIOLOGY | Facility: HOSPITAL | Age: 75
Discharge: HOME | End: 2025-08-07
Attending: INTERNAL MEDICINE
Payer: MEDICARE

## 2025-08-07 DIAGNOSIS — R91.1 SOLITARY PULMONARY NODULE: ICD-10-CM

## 2025-08-07 DIAGNOSIS — Z85.110 PERSONAL HISTORY OF MALIGNANT CARCINOID TUMOR OF BRONCHUS AND LUNG: ICD-10-CM

## 2025-08-07 PROCEDURE — 71250 CT THORAX DX C-: CPT | Mod: TC

## 2025-08-11 ENCOUNTER — RESULTS FOLLOW-UP (OUTPATIENT)
Dept: PULMONOLOGY | Facility: HOSPITAL | Age: 75
End: 2025-08-11
Payer: MEDICARE

## 2025-08-12 ENCOUNTER — TELEPHONE (OUTPATIENT)
Dept: PULMONOLOGY | Facility: CLINIC | Age: 75
End: 2025-08-12
Payer: MEDICARE

## 2025-08-14 ENCOUNTER — ANESTHESIA EVENT (OUTPATIENT)
Dept: SURGERY | Facility: HOSPITAL | Age: 75
End: 2025-08-14
Payer: MEDICARE

## 2025-08-15 ENCOUNTER — ANESTHESIA (OUTPATIENT)
Dept: SURGERY | Facility: HOSPITAL | Age: 75
End: 2025-08-15
Payer: MEDICARE

## 2025-08-15 ENCOUNTER — HOSPITAL ENCOUNTER (OUTPATIENT)
Facility: HOSPITAL | Age: 75
Discharge: HOME | End: 2025-08-15
Attending: INTERNAL MEDICINE | Admitting: INTERNAL MEDICINE
Payer: MEDICARE

## 2025-08-15 VITALS
HEART RATE: 68 BPM | TEMPERATURE: 98 F | SYSTOLIC BLOOD PRESSURE: 103 MMHG | DIASTOLIC BLOOD PRESSURE: 58 MMHG | BODY MASS INDEX: 28.54 KG/M2 | RESPIRATION RATE: 16 BRPM | WEIGHT: 171.31 LBS | OXYGEN SATURATION: 96 % | HEIGHT: 65 IN

## 2025-08-15 DIAGNOSIS — R91.1 SOLITARY PULMONARY NODULE: ICD-10-CM

## 2025-08-15 DIAGNOSIS — N39.46 MIXED INCONTINENCE URGE AND STRESS (MALE)(FEMALE): Primary | ICD-10-CM

## 2025-08-15 DIAGNOSIS — Z85.110 PERSONAL HISTORY OF MALIGNANT CARCINOID TUMOR OF BRONCHUS AND LUNG: ICD-10-CM

## 2025-08-15 LAB
KOH PREP SPEC: NORMAL
POCT GLUCOSE: 89 MG/DL (ref 70–110)
POCT GLUCOSE: 90 MG/DL (ref 70–110)

## 2025-08-15 PROCEDURE — 36000708 HC OR TIME LEV III 1ST 15 MIN: Performed by: INTERNAL MEDICINE

## 2025-08-15 PROCEDURE — 88305 TISSUE EXAM BY PATHOLOGIST: CPT | Mod: TC,91 | Performed by: INTERNAL MEDICINE

## 2025-08-15 PROCEDURE — 71000044 HC DOSC ROUTINE RECOVERY FIRST HOUR: Performed by: INTERNAL MEDICINE

## 2025-08-15 PROCEDURE — 31624 DX BRONCHOSCOPE/LAVAGE: CPT | Mod: 51,,, | Performed by: INTERNAL MEDICINE

## 2025-08-15 PROCEDURE — 25000003 PHARM REV CODE 250

## 2025-08-15 PROCEDURE — 31627 NAVIGATIONAL BRONCHOSCOPY: CPT | Mod: ,,, | Performed by: INTERNAL MEDICINE

## 2025-08-15 PROCEDURE — 37000009 HC ANESTHESIA EA ADD 15 MINS: Performed by: INTERNAL MEDICINE

## 2025-08-15 PROCEDURE — 31654 BRONCH EBUS IVNTJ PERPH LES: CPT | Mod: ,,, | Performed by: INTERNAL MEDICINE

## 2025-08-15 PROCEDURE — 82962 GLUCOSE BLOOD TEST: CPT | Performed by: INTERNAL MEDICINE

## 2025-08-15 PROCEDURE — 87070 CULTURE OTHR SPECIMN AEROBIC: CPT | Mod: 59 | Performed by: INTERNAL MEDICINE

## 2025-08-15 PROCEDURE — 71000015 HC POSTOP RECOV 1ST HR: Performed by: INTERNAL MEDICINE

## 2025-08-15 PROCEDURE — 87070 CULTURE OTHR SPECIMN AEROBIC: CPT | Performed by: INTERNAL MEDICINE

## 2025-08-15 PROCEDURE — 87210 SMEAR WET MOUNT SALINE/INK: CPT | Performed by: INTERNAL MEDICINE

## 2025-08-15 PROCEDURE — 63600175 PHARM REV CODE 636 W HCPCS

## 2025-08-15 PROCEDURE — 37000008 HC ANESTHESIA 1ST 15 MINUTES: Performed by: INTERNAL MEDICINE

## 2025-08-15 PROCEDURE — 87102 FUNGUS ISOLATION CULTURE: CPT | Performed by: INTERNAL MEDICINE

## 2025-08-15 PROCEDURE — 31628 BRONCHOSCOPY/LUNG BX EACH: CPT | Mod: ,,, | Performed by: INTERNAL MEDICINE

## 2025-08-15 PROCEDURE — 87206 SMEAR FLUORESCENT/ACID STAI: CPT | Performed by: INTERNAL MEDICINE

## 2025-08-15 PROCEDURE — 27201423 OPTIME MED/SURG SUP & DEVICES STERILE SUPPLY: Performed by: INTERNAL MEDICINE

## 2025-08-15 PROCEDURE — 36000709 HC OR TIME LEV III EA ADD 15 MIN: Performed by: INTERNAL MEDICINE

## 2025-08-15 PROCEDURE — 87116 MYCOBACTERIA CULTURE: CPT | Performed by: INTERNAL MEDICINE

## 2025-08-15 RX ORDER — LIDOCAINE HYDROCHLORIDE 20 MG/ML
INJECTION, SOLUTION EPIDURAL; INFILTRATION; INTRACAUDAL; PERINEURAL
Status: DISCONTINUED | OUTPATIENT
Start: 2025-08-15 | End: 2025-08-15

## 2025-08-15 RX ORDER — FENTANYL CITRATE 50 UG/ML
INJECTION, SOLUTION INTRAMUSCULAR; INTRAVENOUS
Status: DISCONTINUED | OUTPATIENT
Start: 2025-08-15 | End: 2025-08-15

## 2025-08-15 RX ORDER — HALOPERIDOL LACTATE 5 MG/ML
0.5 INJECTION, SOLUTION INTRAMUSCULAR EVERY 10 MIN PRN
Status: DISCONTINUED | OUTPATIENT
Start: 2025-08-15 | End: 2025-08-15 | Stop reason: HOSPADM

## 2025-08-15 RX ORDER — ACETAMINOPHEN 500 MG
1000 TABLET ORAL
Status: COMPLETED | OUTPATIENT
Start: 2025-08-15 | End: 2025-08-15

## 2025-08-15 RX ORDER — ROCURONIUM BROMIDE 10 MG/ML
INJECTION, SOLUTION INTRAVENOUS
Status: DISCONTINUED | OUTPATIENT
Start: 2025-08-15 | End: 2025-08-15

## 2025-08-15 RX ORDER — PROPOFOL 10 MG/ML
VIAL (ML) INTRAVENOUS
Status: DISCONTINUED | OUTPATIENT
Start: 2025-08-15 | End: 2025-08-15

## 2025-08-15 RX ORDER — GLUCAGON 1 MG
1 KIT INJECTION
Status: DISCONTINUED | OUTPATIENT
Start: 2025-08-15 | End: 2025-08-15 | Stop reason: HOSPADM

## 2025-08-15 RX ORDER — FENTANYL CITRATE 50 UG/ML
25 INJECTION, SOLUTION INTRAMUSCULAR; INTRAVENOUS EVERY 5 MIN PRN
Status: DISCONTINUED | OUTPATIENT
Start: 2025-08-15 | End: 2025-08-15 | Stop reason: HOSPADM

## 2025-08-15 RX ORDER — PHENYLEPHRINE HYDROCHLORIDE 10 MG/ML
INJECTION INTRAVENOUS
Status: DISCONTINUED | OUTPATIENT
Start: 2025-08-15 | End: 2025-08-15

## 2025-08-15 RX ORDER — SODIUM CHLORIDE 0.9 % (FLUSH) 0.9 %
10 SYRINGE (ML) INJECTION
Status: DISCONTINUED | OUTPATIENT
Start: 2025-08-15 | End: 2025-08-15 | Stop reason: HOSPADM

## 2025-08-15 RX ADMIN — ROCURONIUM BROMIDE 50 MG: 10 INJECTION, SOLUTION INTRAVENOUS at 07:08

## 2025-08-15 RX ADMIN — LIDOCAINE HYDROCHLORIDE 100 MG: 20 INJECTION, SOLUTION EPIDURAL; INFILTRATION; INTRACAUDAL; PERINEURAL at 07:08

## 2025-08-15 RX ADMIN — ROCURONIUM BROMIDE 10 MG: 10 INJECTION, SOLUTION INTRAVENOUS at 07:08

## 2025-08-15 RX ADMIN — PHENYLEPHRINE HYDROCHLORIDE 50 MCG: 10 INJECTION INTRAVENOUS at 08:08

## 2025-08-15 RX ADMIN — PROPOFOL 150 MG: 10 INJECTION, EMULSION INTRAVENOUS at 07:08

## 2025-08-15 RX ADMIN — ACETAMINOPHEN 1000 MG: 500 TABLET ORAL at 06:08

## 2025-08-15 RX ADMIN — PHENYLEPHRINE HYDROCHLORIDE 50 MCG: 10 INJECTION INTRAVENOUS at 07:08

## 2025-08-15 RX ADMIN — PROPOFOL 20 MG: 10 INJECTION, EMULSION INTRAVENOUS at 08:08

## 2025-08-15 RX ADMIN — PROPOFOL 20 MG: 10 INJECTION, EMULSION INTRAVENOUS at 07:08

## 2025-08-15 RX ADMIN — FENTANYL CITRATE 50 MCG: 50 INJECTION, SOLUTION INTRAMUSCULAR; INTRAVENOUS at 07:08

## 2025-08-15 RX ADMIN — PHENYLEPHRINE HYDROCHLORIDE 100 MCG: 10 INJECTION INTRAVENOUS at 07:08

## 2025-08-15 RX ADMIN — SUGAMMADEX 300 MG: 100 INJECTION, SOLUTION INTRAVENOUS at 08:08

## 2025-08-15 RX ADMIN — SODIUM CHLORIDE: 9 INJECTION, SOLUTION INTRAVENOUS at 06:08

## 2025-08-15 RX ADMIN — PROPOFOL 100 MCG/KG/MIN: 10 INJECTION, EMULSION INTRAVENOUS at 07:08

## 2025-08-18 LAB — ACID FAST MOD KINY STN SPEC: NORMAL

## 2025-08-19 LAB
BACTERIA SPT CF RESP CULT: NO GROWTH
ESTROGEN SERPL-MCNC: NORMAL PG/ML
FUNGUS SPEC CULT: NORMAL
INSULIN SERPL-ACNC: NORMAL U[IU]/ML
LAB AP CLINICAL INFORMATION: NORMAL
LAB AP GROSS DESCRIPTION: NORMAL
LAB AP NON-GYN INTERPRETATION SPECIMEN 1: NORMAL
LAB AP NON-GYN INTERPRETATION SPECIMEN 2: NORMAL
LAB AP PERFORMING LOCATION(S): NORMAL
LAB AP REPORT FOOTNOTES: NORMAL

## 2025-08-20 ENCOUNTER — TELEPHONE (OUTPATIENT)
Dept: PULMONOLOGY | Facility: CLINIC | Age: 75
End: 2025-08-20
Payer: MEDICARE

## 2025-08-20 DIAGNOSIS — R91.1 SOLITARY PULMONARY NODULE: Primary | ICD-10-CM

## 2025-08-27 ENCOUNTER — PATIENT MESSAGE (OUTPATIENT)
Dept: ADMINISTRATIVE | Facility: OTHER | Age: 75
End: 2025-08-27
Payer: MEDICARE

## (undated) DEVICE — Device

## (undated) DEVICE — TUBING SUC UNIV W/CONN 12FT

## (undated) DEVICE — SEE MEDLINE ITEM 146308

## (undated) DEVICE — TOURNIQUET SB QC DP 34X4IN

## (undated) DEVICE — BAG ION VISION PROBE

## (undated) DEVICE — TUBE SET INFLOW/OUTFLOW

## (undated) DEVICE — DRESSING XEROFORM FOIL PK 1X8

## (undated) DEVICE — SEE MEDLINE ITEM 146298

## (undated) DEVICE — SEE MEDLINE ITEM 157131

## (undated) DEVICE — ADAPTER VISION PROBE & SUCTION

## (undated) DEVICE — GLOVE BIOGEL SKINSENSE PI 7.0

## (undated) DEVICE — GLOVE BIOGEL SKINSENSE PI 8.0

## (undated) DEVICE — BLADE 4.2MM PREBENT ULTRACUT

## (undated) DEVICE — BLADE GATOR 4.2

## (undated) DEVICE — CONTAINER SPECIMEN OR STER 4OZ

## (undated) DEVICE — DRAPE THREE-QTR REINF 53X77IN

## (undated) DEVICE — SOL 9P NACL IRR PIC IL

## (undated) DEVICE — GLOVE BIOGEL SKINSENSE PI 7.5

## (undated) DEVICE — APPLICATOR CHLORAPREP ORN 26ML

## (undated) DEVICE — SOL IRR NACL .9% 3000ML

## (undated) DEVICE — CLOSURE SKIN STERI STRIP 1/2X4

## (undated) DEVICE — PACK ECLIPSE SET-UP W/O DRAPE

## (undated) DEVICE — SPONGE COTTON TRAY 4X4IN

## (undated) DEVICE — SYS LABEL CORRECT MED

## (undated) DEVICE — UNDERGLOVES BIOGEL PI SZ 7 LF

## (undated) DEVICE — NDL FLTR 5MCRN BLNT TIP 18GX1

## (undated) DEVICE — GAUZE SPONGE 4X4 12PLY

## (undated) DEVICE — SEE MEDLINE ITEM 152523

## (undated) DEVICE — CONNECTOR SWIVEL

## (undated) DEVICE — KIT ANTIFOG W/SPONG & FLUID

## (undated) DEVICE — SEE MEDLINE ITEM 146231

## (undated) DEVICE — PAD COLD THERAPY KNEE WRAP ON

## (undated) DEVICE — DRAPE STERI U-SHAPED 47X51IN

## (undated) DEVICE — SYR 10CC LUER LOCK

## (undated) DEVICE — BOWL STERILE LARGE 32OZ

## (undated) DEVICE — SYR SLIP TIP 20CC

## (undated) DEVICE — NDL HYPO REG 25G X 1 1/2

## (undated) DEVICE — SUT PROLENE 3-0 FS-1 MONO18

## (undated) DEVICE — SEE MEDLINE ITEM 146271

## (undated) DEVICE — NDL FLEXISION BIOPSY 21G

## (undated) DEVICE — SEE MEDLINE ITEM 152515

## (undated) DEVICE — BLADE GATOR 3.5 6 EACH/BOX

## (undated) DEVICE — NDL ASPIRATING VIZISHOT 20-40M

## (undated) DEVICE — ADAPTER SWIVEL

## (undated) DEVICE — PAD CAST SPECIALIST STRL 6

## (undated) DEVICE — GOWN POLY REINF BRTH SLV XL

## (undated) DEVICE — PENCIL GOLF STERILE

## (undated) DEVICE — ALCOHOL 70% ISOP W/GREEN 16OZ